# Patient Record
Sex: FEMALE | Race: WHITE | NOT HISPANIC OR LATINO | Employment: OTHER | URBAN - METROPOLITAN AREA
[De-identification: names, ages, dates, MRNs, and addresses within clinical notes are randomized per-mention and may not be internally consistent; named-entity substitution may affect disease eponyms.]

---

## 2017-05-04 ENCOUNTER — CONVERSION ENCOUNTER (OUTPATIENT)
Dept: MAMMOGRAPHY | Facility: HOSPITAL | Age: 77
End: 2017-05-04

## 2017-07-03 ENCOUNTER — CONVERSION ENCOUNTER (OUTPATIENT)
Dept: MAMMOGRAPHY | Facility: HOSPITAL | Age: 77
End: 2017-07-03

## 2017-10-11 ENCOUNTER — TELEPHONE (OUTPATIENT)
Dept: SURGERY | Facility: CLINIC | Age: 77
End: 2017-10-11

## 2017-10-11 NOTE — TELEPHONE ENCOUNTER
Note from Jordon Garland dated October 10, 2017.  Patient continues on Arimidex and plan for 5 years.  He reviewed her bone density result.  No evidence of recurrence.  See her in one year.

## 2018-05-08 ENCOUNTER — TELEPHONE CONVERTED (OUTPATIENT)
Dept: ONCOLOGY | Facility: HOSPITAL | Age: 78
End: 2018-05-08

## 2018-08-21 ENCOUNTER — TELEPHONE CONVERTED (OUTPATIENT)
Dept: ONCOLOGY | Facility: HOSPITAL | Age: 78
End: 2018-08-21
Attending: NURSE PRACTITIONER

## 2018-10-10 ENCOUNTER — OFFICE VISIT CONVERTED (OUTPATIENT)
Dept: ONCOLOGY | Facility: HOSPITAL | Age: 78
End: 2018-10-10
Attending: INTERNAL MEDICINE

## 2019-03-05 ENCOUNTER — HOSPITAL ENCOUNTER (OUTPATIENT)
Dept: MAMMOGRAPHY | Facility: HOSPITAL | Age: 79
Discharge: HOME | End: 2019-03-05
Attending: NURSE PRACTITIONER

## 2019-05-17 ENCOUNTER — HOSPITAL ENCOUNTER (OUTPATIENT)
Dept: LAB | Facility: HOSPITAL | Age: 79
Discharge: HOME | End: 2019-05-17
Attending: INTERNAL MEDICINE

## 2019-05-17 LAB
ALBUMIN SERPL-MCNC: 3.8 G/DL (ref 3.5–5)
ALBUMIN/GLOB SERPL: 1.2 {RATIO} (ref 1.4–2.6)
ALP SERPL-CCNC: 125 U/L (ref 43–160)
ALT SERPL-CCNC: 12 U/L (ref 10–40)
ANION GAP SERPL CALC-SCNC: 17 MMOL/L (ref 8–19)
AST SERPL-CCNC: 14 U/L (ref 15–50)
BASOPHILS # BLD AUTO: 0.02 10*3/UL (ref 0–0.2)
BASOPHILS NFR BLD AUTO: 0.2 % (ref 0–3)
BILIRUB SERPL-MCNC: 0.24 MG/DL (ref 0.2–1.3)
BUN SERPL-MCNC: 26 MG/DL (ref 5–25)
BUN/CREAT SERPL: 18 {RATIO} (ref 6–20)
CALCIUM SERPL-MCNC: 9.6 MG/DL (ref 8.7–10.4)
CHLORIDE SERPL-SCNC: 108 MMOL/L (ref 99–111)
CONV ABS IMM GRAN: 0.05 10*3/UL (ref 0–0.2)
CONV CO2: 22 MMOL/L (ref 22–32)
CONV CREATININE URINE, RANDOM: 111.4 MG/DL (ref 10–300)
CONV IMMATURE GRAN: 0.6 % (ref 0–1.8)
CONV MICROALBUM.,U,RANDOM: <12 MG/L (ref 0–20)
CONV TOTAL PROTEIN: 6.9 G/DL (ref 6.3–8.2)
CREAT UR-MCNC: 1.48 MG/DL (ref 0.5–0.9)
DEPRECATED RDW RBC AUTO: 46.3 FL (ref 36.4–46.3)
EOSINOPHIL # BLD AUTO: 0.37 10*3/UL (ref 0–0.7)
EOSINOPHIL # BLD AUTO: 4.5 % (ref 0–7)
ERYTHROCYTE [DISTWIDTH] IN BLOOD BY AUTOMATED COUNT: 14.9 % (ref 11.7–14.4)
EST. AVERAGE GLUCOSE BLD GHB EST-MCNC: 120 MG/DL
GFR SERPLBLD BASED ON 1.73 SQ M-ARVRAT: 33 ML/MIN/{1.73_M2}
GLOBULIN UR ELPH-MCNC: 3.1 G/DL (ref 2–3.5)
GLUCOSE SERPL-MCNC: 109 MG/DL (ref 65–99)
HBA1C MFR BLD: 5.8 % (ref 3.5–5.7)
HBA1C MFR BLD: 9.3 G/DL (ref 12–16)
HCT VFR BLD AUTO: 29.2 % (ref 37–47)
IRON SATN MFR SERPL: 7 % (ref 20–55)
IRON SERPL-MCNC: 26 UG/DL (ref 60–170)
LYMPHOCYTES # BLD AUTO: 2.76 10*3/UL (ref 1–5)
MCH RBC QN AUTO: 27.4 PG (ref 27–31)
MCHC RBC AUTO-ENTMCNC: 31.8 G/DL (ref 33–37)
MCV RBC AUTO: 86.1 FL (ref 81–99)
MICROALBUMIN/CREAT UR: 10.8 MG/G{CRE} (ref 0–35)
MONOCYTES # BLD AUTO: 0.61 10*3/UL (ref 0.2–1.2)
MONOCYTES NFR BLD AUTO: 7.4 % (ref 3–10)
NEUTROPHILS # BLD AUTO: 4.45 10*3/UL (ref 2–8)
NEUTROPHILS NFR BLD AUTO: 53.9 % (ref 30–85)
NRBC CBCN: 0 % (ref 0–0.7)
OSMOLALITY SERPL CALC.SUM OF ELEC: 301 MOSM/KG (ref 273–304)
PLATELET # BLD AUTO: 217 10*3/UL (ref 130–400)
PMV BLD AUTO: 10.6 FL (ref 9.4–12.3)
POTASSIUM SERPL-SCNC: 4 MMOL/L (ref 3.5–5.3)
RBC # BLD AUTO: 3.39 10*6/UL (ref 4.2–5.4)
SODIUM SERPL-SCNC: 143 MMOL/L (ref 135–147)
TIBC SERPL-MCNC: 395 UG/DL (ref 245–450)
TRANSFERRIN SERPL-MCNC: 276 MG/DL (ref 250–380)
VARIANT LYMPHS NFR BLD MANUAL: 33.4 % (ref 20–45)
WBC # BLD AUTO: 8.26 10*3/UL (ref 4.8–10.8)

## 2019-07-18 ENCOUNTER — OFFICE VISIT CONVERTED (OUTPATIENT)
Dept: GASTROENTEROLOGY | Facility: CLINIC | Age: 79
End: 2019-07-18
Attending: NURSE PRACTITIONER

## 2019-07-18 ENCOUNTER — CONVERSION ENCOUNTER (OUTPATIENT)
Dept: GASTROENTEROLOGY | Facility: CLINIC | Age: 79
End: 2019-07-18

## 2019-08-21 ENCOUNTER — HOSPITAL ENCOUNTER (OUTPATIENT)
Dept: GASTROENTEROLOGY | Facility: HOSPITAL | Age: 79
Setting detail: HOSPITAL OUTPATIENT SURGERY
Discharge: HOME | End: 2019-08-21
Attending: INTERNAL MEDICINE

## 2019-08-21 LAB — GLUCOSE BLD-MCNC: 183 MG/DL (ref 65–99)

## 2019-09-23 ENCOUNTER — HOSPITAL ENCOUNTER (OUTPATIENT)
Dept: LAB | Facility: HOSPITAL | Age: 79
Discharge: HOME | End: 2019-09-23
Attending: INTERNAL MEDICINE

## 2019-09-23 LAB
ALBUMIN SERPL-MCNC: 3.9 G/DL (ref 3.5–5)
ALBUMIN/GLOB SERPL: 1.4 {RATIO} (ref 1.4–2.6)
ALP SERPL-CCNC: 130 U/L (ref 43–160)
ALT SERPL-CCNC: 13 U/L (ref 10–40)
ANION GAP SERPL CALC-SCNC: 13 MMOL/L (ref 8–19)
AST SERPL-CCNC: 14 U/L (ref 15–50)
BASOPHILS # BLD AUTO: 0.03 10*3/UL (ref 0–0.2)
BASOPHILS NFR BLD AUTO: 0.4 % (ref 0–3)
BILIRUB SERPL-MCNC: 0.24 MG/DL (ref 0.2–1.3)
BUN SERPL-MCNC: 32 MG/DL (ref 5–25)
BUN/CREAT SERPL: 22 {RATIO} (ref 6–20)
CALCIUM SERPL-MCNC: 9.9 MG/DL (ref 8.7–10.4)
CHLORIDE SERPL-SCNC: 107 MMOL/L (ref 99–111)
CHOLEST SERPL-MCNC: 168 MG/DL (ref 107–200)
CHOLEST/HDLC SERPL: 4.1 {RATIO} (ref 3–6)
CONV ABS IMM GRAN: 0.04 10*3/UL (ref 0–0.2)
CONV CO2: 24 MMOL/L (ref 22–32)
CONV IMMATURE GRAN: 0.5 % (ref 0–1.8)
CONV TOTAL PROTEIN: 6.7 G/DL (ref 6.3–8.2)
CREAT UR-MCNC: 1.48 MG/DL (ref 0.5–0.9)
DEPRECATED RDW RBC AUTO: 47.4 FL (ref 36.4–46.3)
EOSINOPHIL # BLD AUTO: 0.32 10*3/UL (ref 0–0.7)
EOSINOPHIL # BLD AUTO: 4 % (ref 0–7)
ERYTHROCYTE [DISTWIDTH] IN BLOOD BY AUTOMATED COUNT: 15.1 % (ref 11.7–14.4)
EST. AVERAGE GLUCOSE BLD GHB EST-MCNC: 151 MG/DL
GFR SERPLBLD BASED ON 1.73 SQ M-ARVRAT: 33 ML/MIN/{1.73_M2}
GLOBULIN UR ELPH-MCNC: 2.8 G/DL (ref 2–3.5)
GLUCOSE SERPL-MCNC: 115 MG/DL (ref 65–99)
HBA1C MFR BLD: 6.9 % (ref 3.5–5.7)
HCT VFR BLD AUTO: 30.6 % (ref 37–47)
HDLC SERPL-MCNC: 41 MG/DL (ref 40–60)
HGB BLD-MCNC: 9.8 G/DL (ref 12–16)
LDLC SERPL CALC-MCNC: 88 MG/DL (ref 70–100)
LYMPHOCYTES # BLD AUTO: 2.94 10*3/UL (ref 1–5)
LYMPHOCYTES NFR BLD AUTO: 36.8 % (ref 20–45)
MCH RBC QN AUTO: 27.4 PG (ref 27–31)
MCHC RBC AUTO-ENTMCNC: 32 G/DL (ref 33–37)
MCV RBC AUTO: 85.5 FL (ref 81–99)
MONOCYTES # BLD AUTO: 0.61 10*3/UL (ref 0.2–1.2)
MONOCYTES NFR BLD AUTO: 7.6 % (ref 3–10)
NEUTROPHILS # BLD AUTO: 4.04 10*3/UL (ref 2–8)
NEUTROPHILS NFR BLD AUTO: 50.7 % (ref 30–85)
NRBC CBCN: 0 % (ref 0–0.7)
OSMOLALITY SERPL CALC.SUM OF ELEC: 298 MOSM/KG (ref 273–304)
PLATELET # BLD AUTO: 191 10*3/UL (ref 130–400)
PMV BLD AUTO: 10.7 FL (ref 9.4–12.3)
POTASSIUM SERPL-SCNC: 4.1 MMOL/L (ref 3.5–5.3)
RBC # BLD AUTO: 3.58 10*6/UL (ref 4.2–5.4)
SODIUM SERPL-SCNC: 140 MMOL/L (ref 135–147)
TRIGL SERPL-MCNC: 197 MG/DL (ref 40–150)
VLDLC SERPL-MCNC: 39 MG/DL (ref 5–37)
WBC # BLD AUTO: 7.98 10*3/UL (ref 4.8–10.8)

## 2019-09-26 ENCOUNTER — HOSPITAL ENCOUNTER (OUTPATIENT)
Dept: INFUSION THERAPY | Facility: HOSPITAL | Age: 79
Setting detail: RECURRING SERIES
Discharge: HOME | End: 2019-09-30
Attending: INTERNAL MEDICINE

## 2019-10-24 ENCOUNTER — HOSPITAL ENCOUNTER (OUTPATIENT)
Dept: ONCOLOGY | Facility: HOSPITAL | Age: 79
Discharge: HOME | End: 2019-10-24
Attending: NURSE PRACTITIONER

## 2019-10-24 ENCOUNTER — OFFICE VISIT CONVERTED (OUTPATIENT)
Dept: ONCOLOGY | Facility: HOSPITAL | Age: 79
End: 2019-10-24
Attending: NURSE PRACTITIONER

## 2020-01-09 ENCOUNTER — HOSPITAL ENCOUNTER (OUTPATIENT)
Dept: LAB | Facility: HOSPITAL | Age: 80
Discharge: HOME | End: 2020-01-09
Attending: INTERNAL MEDICINE

## 2020-01-09 LAB
ALBUMIN SERPL-MCNC: 3.8 G/DL (ref 3.5–5)
ALBUMIN/GLOB SERPL: 1.3 {RATIO} (ref 1.4–2.6)
ALP SERPL-CCNC: 124 U/L (ref 43–160)
ALT SERPL-CCNC: 16 U/L (ref 10–40)
ANION GAP SERPL CALC-SCNC: 15 MMOL/L (ref 8–19)
AST SERPL-CCNC: 15 U/L (ref 15–50)
BASOPHILS # BLD AUTO: 0.02 10*3/UL (ref 0–0.2)
BASOPHILS NFR BLD AUTO: 0.3 % (ref 0–3)
BILIRUB SERPL-MCNC: 0.25 MG/DL (ref 0.2–1.3)
BNP SERPL-MCNC: 275 PG/ML (ref 0–1800)
BUN SERPL-MCNC: 26 MG/DL (ref 5–25)
BUN/CREAT SERPL: 17 {RATIO} (ref 6–20)
CALCIUM SERPL-MCNC: 10.1 MG/DL (ref 8.7–10.4)
CHLORIDE SERPL-SCNC: 105 MMOL/L (ref 99–111)
CONV ABS IMM GRAN: 0.02 10*3/UL (ref 0–0.2)
CONV CO2: 21 MMOL/L (ref 22–32)
CONV IMMATURE GRAN: 0.3 % (ref 0–1.8)
CONV TOTAL PROTEIN: 6.7 G/DL (ref 6.3–8.2)
CREAT UR-MCNC: 1.51 MG/DL (ref 0.5–0.9)
DEPRECATED RDW RBC AUTO: 48.2 FL (ref 36.4–46.3)
EOSINOPHIL # BLD AUTO: 0.24 10*3/UL (ref 0–0.7)
EOSINOPHIL # BLD AUTO: 3.5 % (ref 0–7)
ERYTHROCYTE [DISTWIDTH] IN BLOOD BY AUTOMATED COUNT: 15.2 % (ref 11.7–14.4)
EST. AVERAGE GLUCOSE BLD GHB EST-MCNC: 154 MG/DL
FERRITIN SERPL-MCNC: 55 NG/ML (ref 10–200)
GFR SERPLBLD BASED ON 1.73 SQ M-ARVRAT: 32 ML/MIN/{1.73_M2}
GLOBULIN UR ELPH-MCNC: 2.9 G/DL (ref 2–3.5)
GLUCOSE SERPL-MCNC: 171 MG/DL (ref 65–99)
HBA1C MFR BLD: 7 % (ref 3.5–5.7)
HCT VFR BLD AUTO: 33.6 % (ref 37–47)
HGB BLD-MCNC: 10.9 G/DL (ref 12–16)
IRON SATN MFR SERPL: 16 % (ref 20–55)
IRON SERPL-MCNC: 58 UG/DL (ref 60–170)
LYMPHOCYTES # BLD AUTO: 3.07 10*3/UL (ref 1–5)
LYMPHOCYTES NFR BLD AUTO: 44.3 % (ref 20–45)
MCH RBC QN AUTO: 28.2 PG (ref 27–31)
MCHC RBC AUTO-ENTMCNC: 32.4 G/DL (ref 33–37)
MCV RBC AUTO: 86.8 FL (ref 81–99)
MONOCYTES # BLD AUTO: 0.46 10*3/UL (ref 0.2–1.2)
MONOCYTES NFR BLD AUTO: 6.6 % (ref 3–10)
NEUTROPHILS # BLD AUTO: 3.12 10*3/UL (ref 2–8)
NEUTROPHILS NFR BLD AUTO: 45 % (ref 30–85)
NRBC CBCN: 0 % (ref 0–0.7)
OSMOLALITY SERPL CALC.SUM OF ELEC: 293 MOSM/KG (ref 273–304)
PLATELET # BLD AUTO: 175 10*3/UL (ref 130–400)
PMV BLD AUTO: 10.5 FL (ref 9.4–12.3)
POTASSIUM SERPL-SCNC: 4 MMOL/L (ref 3.5–5.3)
RBC # BLD AUTO: 3.87 10*6/UL (ref 4.2–5.4)
SODIUM SERPL-SCNC: 137 MMOL/L (ref 135–147)
TIBC SERPL-MCNC: 353 UG/DL (ref 245–450)
TRANSFERRIN SERPL-MCNC: 247 MG/DL (ref 250–380)
WBC # BLD AUTO: 6.93 10*3/UL (ref 4.8–10.8)

## 2020-03-09 ENCOUNTER — HOSPITAL ENCOUNTER (OUTPATIENT)
Dept: GENERAL RADIOLOGY | Facility: HOSPITAL | Age: 80
Discharge: HOME | End: 2020-03-09
Attending: NURSE PRACTITIONER

## 2020-07-13 ENCOUNTER — HOSPITAL ENCOUNTER (OUTPATIENT)
Dept: LAB | Facility: HOSPITAL | Age: 80
Discharge: HOME | End: 2020-07-13
Attending: INTERNAL MEDICINE

## 2020-07-13 LAB
25(OH)D3 SERPL-MCNC: 51.7 NG/ML (ref 30–100)
ALBUMIN SERPL-MCNC: 4 G/DL (ref 3.5–5)
ALBUMIN/GLOB SERPL: 1.4 {RATIO} (ref 1.4–2.6)
ALP SERPL-CCNC: 106 U/L (ref 43–160)
ALT SERPL-CCNC: 16 U/L (ref 10–40)
ANION GAP SERPL CALC-SCNC: 16 MMOL/L (ref 8–19)
AST SERPL-CCNC: 17 U/L (ref 15–50)
BASOPHILS # BLD AUTO: 0.04 10*3/UL (ref 0–0.2)
BASOPHILS NFR BLD AUTO: 0.5 % (ref 0–3)
BILIRUB SERPL-MCNC: 0.29 MG/DL (ref 0.2–1.3)
BUN SERPL-MCNC: 38 MG/DL (ref 5–25)
BUN/CREAT SERPL: 21 {RATIO} (ref 6–20)
CALCIUM SERPL-MCNC: 9.5 MG/DL (ref 8.7–10.4)
CHLORIDE SERPL-SCNC: 106 MMOL/L (ref 99–111)
CHOLEST SERPL-MCNC: 192 MG/DL (ref 107–200)
CHOLEST/HDLC SERPL: 4.5 {RATIO} (ref 3–6)
CONV ABS IMM GRAN: 0.04 10*3/UL (ref 0–0.2)
CONV CO2: 22 MMOL/L (ref 22–32)
CONV IMMATURE GRAN: 0.5 % (ref 0–1.8)
CONV TOTAL PROTEIN: 6.8 G/DL (ref 6.3–8.2)
CREAT UR-MCNC: 1.85 MG/DL (ref 0.5–0.9)
DEPRECATED RDW RBC AUTO: 51.7 FL (ref 36.4–46.3)
EOSINOPHIL # BLD AUTO: 0.38 10*3/UL (ref 0–0.7)
EOSINOPHIL # BLD AUTO: 4.6 % (ref 0–7)
ERYTHROCYTE [DISTWIDTH] IN BLOOD BY AUTOMATED COUNT: 15.9 % (ref 11.7–14.4)
EST. AVERAGE GLUCOSE BLD GHB EST-MCNC: 171 MG/DL
GFR SERPLBLD BASED ON 1.73 SQ M-ARVRAT: 25 ML/MIN/{1.73_M2}
GLOBULIN UR ELPH-MCNC: 2.8 G/DL (ref 2–3.5)
GLUCOSE SERPL-MCNC: 146 MG/DL (ref 65–99)
HBA1C MFR BLD: 7.6 % (ref 3.5–5.7)
HCT VFR BLD AUTO: 32.7 % (ref 37–47)
HDLC SERPL-MCNC: 43 MG/DL (ref 40–60)
HGB BLD-MCNC: 10.4 G/DL (ref 12–16)
LDLC SERPL CALC-MCNC: 109 MG/DL (ref 70–100)
LYMPHOCYTES # BLD AUTO: 3.12 10*3/UL (ref 1–5)
LYMPHOCYTES NFR BLD AUTO: 37.7 % (ref 20–45)
MCH RBC QN AUTO: 28.1 PG (ref 27–31)
MCHC RBC AUTO-ENTMCNC: 31.8 G/DL (ref 33–37)
MCV RBC AUTO: 88.4 FL (ref 81–99)
MONOCYTES # BLD AUTO: 0.56 10*3/UL (ref 0.2–1.2)
MONOCYTES NFR BLD AUTO: 6.8 % (ref 3–10)
NEUTROPHILS # BLD AUTO: 4.14 10*3/UL (ref 2–8)
NEUTROPHILS NFR BLD AUTO: 49.9 % (ref 30–85)
NRBC CBCN: 0 % (ref 0–0.7)
OSMOLALITY SERPL CALC.SUM OF ELEC: 302 MOSM/KG (ref 273–304)
PLATELET # BLD AUTO: 201 10*3/UL (ref 130–400)
PMV BLD AUTO: 10.8 FL (ref 9.4–12.3)
POTASSIUM SERPL-SCNC: 4.1 MMOL/L (ref 3.5–5.3)
RBC # BLD AUTO: 3.7 10*6/UL (ref 4.2–5.4)
SODIUM SERPL-SCNC: 140 MMOL/L (ref 135–147)
TRIGL SERPL-MCNC: 202 MG/DL (ref 40–150)
TSH SERPL-ACNC: 3.52 M[IU]/L (ref 0.27–4.2)
VLDLC SERPL-MCNC: 40 MG/DL (ref 5–37)
WBC # BLD AUTO: 8.28 10*3/UL (ref 4.8–10.8)

## 2020-10-27 ENCOUNTER — HOSPITAL ENCOUNTER (OUTPATIENT)
Dept: CARDIOLOGY | Facility: HOSPITAL | Age: 80
Discharge: HOME | End: 2020-10-27
Attending: INTERNAL MEDICINE

## 2020-11-02 ENCOUNTER — HOSPITAL ENCOUNTER (OUTPATIENT)
Dept: LAB | Facility: HOSPITAL | Age: 80
Discharge: HOME | End: 2020-11-02
Attending: INTERNAL MEDICINE

## 2020-11-02 LAB
ALBUMIN SERPL-MCNC: 3.7 G/DL (ref 3.5–5)
ALBUMIN/GLOB SERPL: 1.2 {RATIO} (ref 1.4–2.6)
ALP SERPL-CCNC: 134 U/L (ref 43–160)
ALT SERPL-CCNC: 18 U/L (ref 10–40)
AMPHETAMINES UR QL SCN: NEGATIVE
ANION GAP SERPL CALC-SCNC: 18 MMOL/L (ref 8–19)
AST SERPL-CCNC: 18 U/L (ref 15–50)
BARBITURATES UR QL SCN: NEGATIVE
BASOPHILS # BLD AUTO: 0.04 10*3/UL (ref 0–0.2)
BASOPHILS NFR BLD AUTO: 0.5 % (ref 0–3)
BENZODIAZ UR QL SCN: NEGATIVE
BILIRUB SERPL-MCNC: 0.23 MG/DL (ref 0.2–1.3)
BUN SERPL-MCNC: 32 MG/DL (ref 5–25)
BUN/CREAT SERPL: 19 {RATIO} (ref 6–20)
CALCIUM SERPL-MCNC: 9.8 MG/DL (ref 8.7–10.4)
CHLORIDE SERPL-SCNC: 110 MMOL/L (ref 99–111)
CONV ABS IMM GRAN: 0.03 10*3/UL (ref 0–0.2)
CONV CO2: 18 MMOL/L (ref 22–32)
CONV COCAINE, UR: NEGATIVE
CONV IMMATURE GRAN: 0.4 % (ref 0–1.8)
CONV TOTAL PROTEIN: 6.7 G/DL (ref 6.3–8.2)
CREAT UR-MCNC: 1.66 MG/DL (ref 0.5–0.9)
DEPRECATED RDW RBC AUTO: 49.2 FL (ref 36.4–46.3)
EOSINOPHIL # BLD AUTO: 0.23 10*3/UL (ref 0–0.7)
EOSINOPHIL # BLD AUTO: 2.7 % (ref 0–7)
ERYTHROCYTE [DISTWIDTH] IN BLOOD BY AUTOMATED COUNT: 15.9 % (ref 11.7–14.4)
EST. AVERAGE GLUCOSE BLD GHB EST-MCNC: 183 MG/DL
GFR SERPLBLD BASED ON 1.73 SQ M-ARVRAT: 29 ML/MIN/{1.73_M2}
GLOBULIN UR ELPH-MCNC: 3 G/DL (ref 2–3.5)
GLUCOSE SERPL-MCNC: 78 MG/DL (ref 65–99)
HBA1C MFR BLD: 8 % (ref 3.5–5.7)
HCT VFR BLD AUTO: 34.4 % (ref 37–47)
HGB BLD-MCNC: 11.1 G/DL (ref 12–16)
LYMPHOCYTES # BLD AUTO: 2.65 10*3/UL (ref 1–5)
LYMPHOCYTES NFR BLD AUTO: 31.1 % (ref 20–45)
MCH RBC QN AUTO: 27.7 PG (ref 27–31)
MCHC RBC AUTO-ENTMCNC: 32.3 G/DL (ref 33–37)
MCV RBC AUTO: 85.8 FL (ref 81–99)
METHADONE UR QL SCN: NEGATIVE
MONOCYTES # BLD AUTO: 0.57 10*3/UL (ref 0.2–1.2)
MONOCYTES NFR BLD AUTO: 6.7 % (ref 3–10)
NEUTROPHILS # BLD AUTO: 4.99 10*3/UL (ref 2–8)
NEUTROPHILS NFR BLD AUTO: 58.6 % (ref 30–85)
NRBC CBCN: 0 % (ref 0–0.7)
OPIATES TESTED UR SCN: NEGATIVE
OSMOLALITY SERPL CALC.SUM OF ELEC: 300 MOSM/KG (ref 273–304)
OXYCODONE UR QL SCN: NEGATIVE
PCP UR QL: NEGATIVE
PLATELET # BLD AUTO: 216 10*3/UL (ref 130–400)
PMV BLD AUTO: 10.3 FL (ref 9.4–12.3)
POTASSIUM SERPL-SCNC: 3.8 MMOL/L (ref 3.5–5.3)
RBC # BLD AUTO: 4.01 10*6/UL (ref 4.2–5.4)
SODIUM SERPL-SCNC: 142 MMOL/L (ref 135–147)
THC SERPLBLD CFM-MCNC: NEGATIVE NG/ML
WBC # BLD AUTO: 8.51 10*3/UL (ref 4.8–10.8)

## 2020-11-10 ENCOUNTER — HOSPITAL ENCOUNTER (OUTPATIENT)
Dept: GENERAL RADIOLOGY | Facility: HOSPITAL | Age: 80
Discharge: HOME | End: 2020-11-10
Attending: INTERNAL MEDICINE

## 2020-11-18 ENCOUNTER — HOSPITAL ENCOUNTER (OUTPATIENT)
Dept: GENERAL RADIOLOGY | Facility: HOSPITAL | Age: 80
Discharge: HOME | End: 2020-11-18
Attending: INTERNAL MEDICINE

## 2021-04-12 ENCOUNTER — HOSPITAL ENCOUNTER (OUTPATIENT)
Dept: LAB | Facility: HOSPITAL | Age: 81
Discharge: HOME | End: 2021-04-12
Attending: INTERNAL MEDICINE

## 2021-04-12 LAB
ALBUMIN SERPL-MCNC: 3.6 G/DL (ref 3.5–5)
ALBUMIN/GLOB SERPL: 1.2 {RATIO} (ref 1.4–2.6)
ALP SERPL-CCNC: 122 U/L (ref 43–160)
ALT SERPL-CCNC: 16 U/L (ref 10–40)
AMPHETAMINES UR QL SCN: NEGATIVE
ANION GAP SERPL CALC-SCNC: 14 MMOL/L (ref 8–19)
AST SERPL-CCNC: 19 U/L (ref 15–50)
BARBITURATES UR QL SCN: NEGATIVE
BASOPHILS # BLD AUTO: 0.02 10*3/UL (ref 0–0.2)
BASOPHILS NFR BLD AUTO: 0.2 % (ref 0–3)
BENZODIAZ UR QL SCN: NEGATIVE
BILIRUB SERPL-MCNC: 0.21 MG/DL (ref 0.2–1.3)
BUN SERPL-MCNC: 38 MG/DL (ref 5–25)
BUN/CREAT SERPL: 20 {RATIO} (ref 6–20)
CALCIUM SERPL-MCNC: 9.9 MG/DL (ref 8.7–10.4)
CHLORIDE SERPL-SCNC: 109 MMOL/L (ref 99–111)
CHOLEST SERPL-MCNC: 217 MG/DL (ref 107–200)
CHOLEST/HDLC SERPL: 5.3 {RATIO} (ref 3–6)
CONV ABS IMM GRAN: 0.03 10*3/UL (ref 0–0.2)
CONV CO2: 20 MMOL/L (ref 22–32)
CONV COCAINE, UR: NEGATIVE
CONV CREATININE URINE, RANDOM: 195.6 MG/DL (ref 10–300)
CONV IMMATURE GRAN: 0.4 % (ref 0–1.8)
CONV MICROALBUM.,U,RANDOM: <12 MG/L (ref 0–20)
CONV TOTAL PROTEIN: 6.7 G/DL (ref 6.3–8.2)
CREAT UR-MCNC: 1.91 MG/DL (ref 0.5–0.9)
DEPRECATED RDW RBC AUTO: 51 FL (ref 36.4–46.3)
EOSINOPHIL # BLD AUTO: 0.21 10*3/UL (ref 0–0.7)
EOSINOPHIL # BLD AUTO: 2.6 % (ref 0–7)
ERYTHROCYTE [DISTWIDTH] IN BLOOD BY AUTOMATED COUNT: 16.3 % (ref 11.7–14.4)
EST. AVERAGE GLUCOSE BLD GHB EST-MCNC: 148 MG/DL
GFR SERPLBLD BASED ON 1.73 SQ M-ARVRAT: 24 ML/MIN/{1.73_M2}
GLOBULIN UR ELPH-MCNC: 3.1 G/DL (ref 2–3.5)
GLUCOSE SERPL-MCNC: 126 MG/DL (ref 65–99)
HBA1C MFR BLD: 6.8 % (ref 3.5–5.7)
HCT VFR BLD AUTO: 33.4 % (ref 37–47)
HDLC SERPL-MCNC: 41 MG/DL (ref 40–60)
HGB BLD-MCNC: 10.5 G/DL (ref 12–16)
LDLC SERPL CALC-MCNC: 108 MG/DL (ref 70–100)
LYMPHOCYTES # BLD AUTO: 2.99 10*3/UL (ref 1–5)
LYMPHOCYTES NFR BLD AUTO: 36.6 % (ref 20–45)
MCH RBC QN AUTO: 27.2 PG (ref 27–31)
MCHC RBC AUTO-ENTMCNC: 31.4 G/DL (ref 33–37)
MCV RBC AUTO: 86.5 FL (ref 81–99)
METHADONE UR QL SCN: NEGATIVE
MICROALBUMIN/CREAT UR: 6.1 MG/G{CRE} (ref 0–35)
MONOCYTES # BLD AUTO: 0.58 10*3/UL (ref 0.2–1.2)
MONOCYTES NFR BLD AUTO: 7.1 % (ref 3–10)
NEUTROPHILS # BLD AUTO: 4.33 10*3/UL (ref 2–8)
NEUTROPHILS NFR BLD AUTO: 53.1 % (ref 30–85)
NRBC CBCN: 0 % (ref 0–0.7)
OPIATES TESTED UR SCN: NEGATIVE
OSMOLALITY SERPL CALC.SUM OF ELEC: 299 MOSM/KG (ref 273–304)
OXYCODONE UR QL SCN: NEGATIVE
PCP UR QL: NEGATIVE
PLATELET # BLD AUTO: 209 10*3/UL (ref 130–400)
PMV BLD AUTO: 10.7 FL (ref 9.4–12.3)
POTASSIUM SERPL-SCNC: 4.3 MMOL/L (ref 3.5–5.3)
RBC # BLD AUTO: 3.86 10*6/UL (ref 4.2–5.4)
SODIUM SERPL-SCNC: 139 MMOL/L (ref 135–147)
THC SERPLBLD CFM-MCNC: NEGATIVE NG/ML
TRIGL SERPL-MCNC: 341 MG/DL (ref 40–150)
TSH SERPL-ACNC: 2.96 M[IU]/L (ref 0.27–4.2)
VLDLC SERPL-MCNC: 68 MG/DL (ref 5–37)
WBC # BLD AUTO: 8.16 10*3/UL (ref 4.8–10.8)

## 2021-04-20 ENCOUNTER — HOSPITAL ENCOUNTER (OUTPATIENT)
Dept: GENERAL RADIOLOGY | Facility: HOSPITAL | Age: 81
Discharge: HOME | End: 2021-04-20
Attending: INTERNAL MEDICINE

## 2021-04-29 ENCOUNTER — HOSPITAL ENCOUNTER (OUTPATIENT)
Dept: GENERAL RADIOLOGY | Facility: HOSPITAL | Age: 81
Discharge: HOME | End: 2021-04-29
Attending: INTERNAL MEDICINE

## 2021-05-15 VITALS
BODY MASS INDEX: 36.02 KG/M2 | SYSTOLIC BLOOD PRESSURE: 115 MMHG | HEIGHT: 66 IN | DIASTOLIC BLOOD PRESSURE: 56 MMHG | WEIGHT: 224.12 LBS

## 2021-05-28 VITALS
TEMPERATURE: 97.8 F | DIASTOLIC BLOOD PRESSURE: 41 MMHG | SYSTOLIC BLOOD PRESSURE: 109 MMHG | OXYGEN SATURATION: 97 % | HEIGHT: 64 IN | RESPIRATION RATE: 16 BRPM | WEIGHT: 229.94 LBS | HEART RATE: 45 BPM | BODY MASS INDEX: 39.26 KG/M2

## 2021-05-28 VITALS
SYSTOLIC BLOOD PRESSURE: 126 MMHG | WEIGHT: 232.59 LBS | DIASTOLIC BLOOD PRESSURE: 60 MMHG | HEIGHT: 64 IN | TEMPERATURE: 97 F | HEART RATE: 86 BPM | BODY MASS INDEX: 39.71 KG/M2 | OXYGEN SATURATION: 98 %

## 2021-05-28 NOTE — TELEPHONE ENCOUNTER
Patient: MISAEL THORNE     Acct: [id number]     Report: #[id number]  UNIT #: [id number]     : 1940    Encounter Date:2018  PRIMARY CARE: Willie Ly  ***Signed***  --------------------------------------------------------------------------------------------------------------------  Allergies      Coded Allergies:             STATINS-HMG-COA REDUCTASE INHIBITOR (Verified  Allergy, Unknown, MUSCLE     CRAMPS, 17)      Uncoded Allergies:             HORMONES AND SYNTHETIC SUBSTITUTES (Allergy, Mild, 10/5/07)            Medications      Last Reconciled on 10/10/17 16:43 by JORDON GARLAND MD      Anastrozole (Arimidex*) 1 Mg Tablet      1 MG PO QDAY, #90 TAB         Prov: SIL SMITH onc         18       Anastrozole (Arimidex*) 1 Mg Tablet      1 MG PO QDAY, #30 TAB 5 Refills         Prov: Yonathan Villarreal         18       (Lantus) Unknown Strength  No Conflict Check               Reported         10/10/17       DULoxetine (Cymbalta) 60 Mg Capsule.dr      60 MG PO QDAY, #30 CAP 0 Refills         Reported         17       Hydrocodone/Acetaminophen 7.5/325 MG (Norco 7.5/325 Mg) 1 Tab Tab      1 TAB PO Q4H PRN for BREAKTHROUGH PAIN, TAB 0 Refills         Reported         17       Trimethoprim/Sulfamethoxazole DS (Bactrim /160 MG*) 1 Each Tablet      1 TAB PO BID, TAB 0 Refills         Reported         17       Carvedilol (Carvedilol) 12.5 Mg Tablet      12.5 MG PO BID, #60 TAB 0 Refills         Reported         17       Anastrozole (Arimidex*) 1 Mg Tablet      1 MG PO QDAY, #90 TAB 3 Refills         Prov: Jordon Garland         17       Cyanocobalamin (Vitamin B-12*) 1,000 Mcg Tablet      1000 MCG PO QDAY, #30 TAB 0 Refills         Reported         10/25/16       Cholecalciferol (Vitamin D3*) 2,000 U Tablet      2000 UNITS PO QDAY, #30 TAB 0 Refills         Reported         10/25/16       Apixaban (Eliquis) 5 Mg Tab      5 MG PO BID, #60 TAB         Reported          6/21/16       glyBURIDE/metFORMIN 2.5/500 Mg* (Glucovance*) 1 Tab Tablet      1 TAB PO BID MEALS, #60 TAB 0 Refills         Reported         6/21/16       Losartan/Hydrochlorothiazide (Losartan/Hctz 100/25 Mg) 1 Tab Tablet      1 TAB PO QDAY, #30 TAB 0 Refills         Reported         6/21/16       Anastrozole (Anastrozole) 1 Mg Tab      1 MG PO QDAY, #30 TAB         Reported         6/21/16       Doxazosin Mesylate (Cardura) 4 Mg Tablet      4 MG PO QDAY, #30 TAB         Reported         6/21/16       Diltiazem CD (Cardizem CD) 180 Mg Capcr      180 MG PO QDAY, #30 CAP.ER         Reported         6/21/16       hydrALAZINE HCl (Apresoline) 50 Mg Tab      100 MG PO TID, #30 TAB 0 Refills         Reported         3/8/16       sitaGLIPtin (Januvia*) 50 Mg Tablet      50 MG PO QDAY, TAB         Reported         11/19/15       Zolpidem Tartrate (Ambien) 5 Mg Tablet      5 MG PO HS PRN for SLEEP, TAB 0 Refills         Reported         12/31/14       Allopurinol (Allopurinol) 300 Mg Tablet      300 MG PO QDAY, TAB 0 Refills         Reported         12/31/14       Sertraline HCl (Sertraline*) 50 Mg Tablet      25 MG PO QDAY, TAB 0 Refills         Reported         12/31/14      Current Medications      Current Medications Reviewed 8/27/18            Time of Call      14:49      Source of Call:  Patient            Reason for Call            Pt called about tamoxifen, confirmed with pt that provider sent Rx to      pharmacy. Pt has no further questions. JS            Guideline(s) Referenced      Yes            Verbalized Understanding      Caller verbalized, understanding of instructions given                 Disclaimer: Converted document may not contain table formatting or lab diagrams. Please see [email or web address] [facility] for the authenticated document.

## 2021-05-28 NOTE — PROGRESS NOTES
Patient: MISAEL THORNE     Acct: [id number]     Report: #[id number]  UNIT #: [id number]     : 1940    Encounter Date:10/24/2019  PRIMARY CARE: Willie Ly  ***Signed***  --------------------------------------------------------------------------------------------------------------------  NURSE INTAKE      Visit Type      Established Patient Visit            Chief Complaint      BREAST CA            History and Present Illness      Past Oncology Illness History      Chief Complaint: T1a N0 M0 Stage IA invasive ductal carcinoma the left breast,     ER positive IL positive HER-2/sherwin negative, status post lumpectomy on 2014            Ms. Thorne is a very pleasant 77-year-old  female who I saw in consult     on 14. On routine mammogram performed 10/06/2014 a suspicious irregular     microlobulated mass at the 12:00 position in the left breast was noted on     mammogram and ultrasound. 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 She    then underwent a left lumpectomy with sentinel lymph node evaluation on     2014. This showed a 1.2 cm invasive ductal carcinoma, grade 2, negative     margins with associated DCIS also with negative margins. 4 sentinel and non-    sentinel lymph nodes were removed and fortunately zero out of four were involved    with any metastatic deposit. She was then referred to my clinic for additional     discussion of workup and treatment options.             At her first appointment I sent her breast pathology for Oncotype analysis which    returned on 2015 with a recurrent score of 16 indicating a 10 year risk of    distant recurrence of approximately 10% utilizing endocrine therapy alone. This     placed her firmly in the low risk category. She also has seen radiation oncology     who given her age and early stage breast cancer recommended no adjuvant     radiation.            Interim History /DATE: 1/4/17      In the interim she has remained on Arimidex with no complications or side     effects. Bone densiometry done 2/9/15 was normal.  She denies any hot flashes.     No myalgias. No arthralgias. No diarrhea. No rash. She received a mammogram in     Oct 2016 which was benign. She is wanting to do her follow-up imaging through my    service moving forward.            She received a total knee replacement in the interim.            INTERIM HISTORY: June 14, 2017      Scheduled 3 month follow up she denies pain, new breast lumps, fever or     infections. Mammogram on 5/4/17 benign. DEXA is due. As noted she did not     receive any adjuvant XRT and continues on Arimidex.            INTERIM HISTORY: 10/10/17      Scheduled follow up for breast cancer and is s/p lumpectomy 2014. She denies new    pain or breast lumps. Her DEXA showed normal density. She denies any increasing     hot flashes.            OTHER:      MOST RECENT MAMMOGRAM:  5/4/17      MOST RECENT DEXA:  7/3/17            -May 9-2018.  WBC 8.6.  Hemoglobin 11.7.  Platelet count 208,000      -July .  Creatinine 1.8.  Sodium 145.            HPI - Oncology Interim      1) Invasive ductal carcinoma of the left breast: diagnosed 12/2014.             She is status post left lumpectomy in 12/2014. Completed radiation. ER, TN +.     Oncotype DX score 16. She will completed 5 years of AI therapy in December of     this year ( 2019). She reports she has tolerated Arimidex well without any     intolerable side effects. Today, we discussed the role of BCI (breast cancer     index testing) to see if she would benefit from additional 5 years of endocrine     therapy. At this time, patient is not interested in continuing the AI therapy     for an additional 5 years nor is she interested in having the BCI testing     completed either.              Last mammogram in March of 2019 was benign. Last bone density was normal in 7/ 2017.            Clinical Staging      T1a N0 M0 Stage IA invasive ductal carcinoma the left breast, ER positive CT     positive HER-2/sherwin negative, status post lumpectomy on 12/03/2014. Oncotype 16     (low risk group)            Clinical Trial Participant      No            ECOG Performance Status      0            PAST, FAMILY   Social History      Lives independently:  Yes            Tobacco Use      Tobacco status:  Never smoker            Alcohol Use      Alcohol intake:  None            Substance Use      Substance use:  Denies use            REVIEW OF SYSTEMS      General:  Denies: Appetite Change, Fatigue, Fever, Night Sweats, Weight Gain,     Weight Loss      Eye:  Denies Blurred Vision, Denies Corrective Lenses, Denies Diplopia, Denies     Vision Changes      ENT:  Denies Headache, Denies Hearing Loss, Denies Hoarseness, Denies Sore     Throat      Cardiovascular:  Denies Chest Pain, Denies Palpitations      Respiratory:  Denies: Coughing Blood, Productive Cough, Shortness of Air,     Wheezing      Gastrointestinal:  Denies Bloody Stools, Denies Constipation, Denies Diarrhea,     Denies Nausea/Vomiting, Denies Problem Swallowing, Denies Unable to Control     Bowels      Genitourinary:  Denies Blood in Urine, Denies Incontinence, Denies Painful     Urination      Musculoskeletal:  Denies Back Pain, Denies Muscle Pain, Denies Painful Joints      Integumentary:  Denies Itching, Denies Lesions, Denies Rash      Neurologic:  Denies Dizziness, Denies Numbness\Tingling, Denies Seizures      Psychiatric:  Denies Anxiety, Denies Depression      Endocrine:  Denies Cold Intolerance, Denies Heat Intolerance      Hematologic/Lymphatic:  Denies Bruising, Denies Bleeding, Denies Enlarged Lymph     Nodes      Reproductive:  Denies: Menopause, Heavy Periods, Pregnant, Still Menstruating            VITAL SIGNS AND SCORES      Vitals       Height 5 ft 3.5 in / 161.29 cm      Weight 229 lbs 15.036 oz / 104.3 kg      BSA 2.06 m2      BMI 40.1 kg/m2      Temperature 97.8 F / 36.56 C - Temporal      Pulse 45      Respirations 16      Blood Pressure 109/41 Sitting, Right Arm      Pulse Oximetry 97%, ROOM AIR            Pain Score      Experiencing any pain?:  No      Pain Scale Used:  Numerical      Pain Intensity:  0            Fatigue Score      Experiencing any fatigue?:  No      Fatigue (0-10 scale):  0 (none)            EXAM      General Appearance:  Positive for: Alert, Oriented x3, Cooperative      Eye:  Positive for: Moist Conjunctiva, PERRLA, Reactive to Light      HEENT:  Positive for: Oropharynx clear;          Negative for: Erythema      Neck:  Positive for: Full ROM, Supple      Respiratory:  Positive for: CTAB, Normal Respiratory Effort      Abdomen/Gastro:  Positive for: Normal Active Bowel Sounds, Soft;          Negative for: Distention, Tenderness      Cardiovascular:  Positive for: RRR;          Negative for: Murmur, Peripheral Edema      Skin:  Positive for: Normal Temperature, Normal Texture and Turgor, Normal Tone;             Negative for: Rash      Psychiatric:  Positive for: AAO X 3, Appropriate Affect, Intact Judgement      Neurologic:  Positive for: Cranial Ner II-XII Intact, Deep Tendon Reflexes;          Negative for: Dizziness      Genitourinary:  Negative for: Bladder Distention      Musculoskeletal:  Positive for: Full ROM Lower Extremety, Full ROM Upper     Extremety, Full Muscle Strength, Full Muscle Tone      Lower Extremities:  Positive for: Pedal Pulses Intact, Pedal Pulses Symetrical      Upper Extremities:  Negative for: Weakness      Lymphatic:  Negative for: Axillary, Cervical, Supraclavicular            PREVENTION      Date Influenza Vaccine Given:  Oct 1, 2018      Influenza Vaccine Declined:  No      2 or More Falls Past Year?:  No      Fall Past Year with Injury?:  No      Encouraged to follow-up with:  PCP  regarding preventative exams.      Chart initiated by      YESIKA ORTIZ MA            ALLERGY/MEDS      Allergies      Coded Allergies:             STATINS-HMG-COA REDUCTASE INHIBITOR (Verified  Allergy, Unknown, MUSCLE     CRAMPS, 10/24/19)      Uncoded Allergies:             HORMONES AND SYNTHETIC SUBSTITUTES (Allergy, Mild, 10/5/07)            Medications      Last Reconciled on 10/24/19 13:45 by CORI BARRERA      Anastrozole (Anastrozole) 1 Mg Tablet      1 MG PO QDAY for 90 Days, #90 TAB 1 Refill         Prov: CORI BARRERA onc         10/10/19       Doxazosin Mesylate (Cardura) 4 Mg Tablet      4 MG PO QDAY, #30 TAB         Reported         8/21/19       Insulin Glargine (Lantus SOLOSTAR) 100 Unit/1 Ml Insuln.pen      50 UNITS SUBQ HS, #1 BOX 0 Refills         Reported         8/21/19       Cholecalciferol (Vitamin D3*) 1,000 Unit Tab      1000 UNITS PO QDAY, #30 TAB 0 Refills         Reported         8/21/19       Carvedilol (Carvedilol) 12.5 Mg Tablet      12.5 MG PO BID, #60 TAB 0 Refills         Reported         6/14/17       Apixaban (Eliquis) 5 Mg Tab      5 MG PO BID, #60 TAB         Reported         6/21/16       glyBURIDE-metFORMIN 2.5-500 Mg (glyBURIDE-metFORMIN 2.5-500 Mg) 1 Tab Tablet      1 TAB PO BID MEALS, #60 TAB 0 Refills         Reported         6/21/16       Losartan/Hydrochlorothiazide (Losartan/Hctz 100/25 Mg) 1 Tab Tablet      1 TAB PO QDAY, #30 TAB 0 Refills         Reported         6/21/16       Diltiazem CD (Cardizem CD) 180 Mg Capcr      180 MG PO QDAY, #30 CAP.ER         Reported         6/21/16       hydrALAZINE HCl (Apresoline) 50 Mg Tab      100 MG PO BID, #30 TAB 0 Refills         Reported         3/8/16       sitaGLIPtin (Januvia*) 50 Mg Tablet      50 MG PO QDAY, TAB         Reported         11/19/15       Zolpidem Tartrate (Ambien) 5 Mg Tablet      5 MG PO HS PRN for SLEEP, TAB 0 Refills         Reported         12/31/14       Allopurinol (Allopurinol) 300 Mg  Tablet      300 MG PO QDAY, TAB 0 Refills         Reported         12/31/14      Medications Reviewed:  No Changes made to meds            IMPRESSION/PLAN      Impression      1) Invasive ductal carcinoma of the left breast: diagnosed 12/2014.             She is status post left lumpectomy in 12/2014. Completed radiation. ER, AR +.     Oncotype DX score 16. She will completed 5 years of AI therapy in December of     this year ( 2019). She reports she has tolerated Arimidex well without any     intolerable side effects. Today, we discussed the role of BCI (breast cancer     index testing) to see if she would benefit from additional 5 years of endocrine     therapy. At this time, patient is not interested in continuing the AI therapy     for an additional 5 years nor is she interested in having the BCI testing     completed either.             Last mammogram in March of 2019 was benign. Last bone density was normal in 7/ 2017.            Diagnosis      Aromatase inhibitor use - Z79.811            Breast cancer, left         Malignant neoplasm of upper-outer quadrant of left breast in female, estrogen       receptor positive         Breast location: upper outer quadrant of breast         Estrogen receptor status: positive         Patient sex: female            Invasive ductal carcinoma of breast, stage 1         Infiltrating ductal carcinoma of breast, stage 1, left         Laterality: left            Notes      New Diagnostics      * Screening Mammo, 5 Months         Dx: Aromatase inhibitor use - Z79.811      * Bone Densitometry DEXA, 5 Months         Dx: Aromatase inhibitor use - Z79.811            Plan      1) Patient may stop Arimidex in December after prescription runs out. She is not    interested in BCI testing at this time.             Bilateral mammograms ordered for March 2020            Dexa scan ordered for March 2020.             We will see her in March 2020 for these and then see her on a yearly  basis for     mammogram screening.             Follow up with NP in 5 months after mammo and dexa scan completed.            Patient Education            Anastrozole      [employer] X-ray Absorptiometry      Patient Education Provided:  Yes            Alcohol Counseling      Counseling given:  None            Substance Counseling      Counseling given:  None            Electronically signed by CORI BARRERA onc  10/24/2019 13:45       Disclaimer: Converted document may not contain table formatting or lab diagrams. Please see [employer] [facility] for the authenticated document.

## 2021-05-28 NOTE — TELEPHONE ENCOUNTER
Patient: MISAEL THORNE     Acct: [id number]     Report: #[id number]  UNIT #: [id number]     : 1940    Encounter Date:2018  PRIMARY CARE: Willie Ly  ***Signed***  --------------------------------------------------------------------------------------------------------------------  MD      Provider:  CAROLIN Shea            Allergies      Coded Allergies:             STATINS-HMG-COA REDUCTASE INHIBITOR (Verified  Allergy, Unknown, MUSCLE     CRAMPS, 17)      Uncoded Allergies:             HORMONES AND SYNTHETIC SUBSTITUTES (Allergy, Mild, 10/5/07)            Medications      Last Reconciled on 10/10/17 16:43 by JORDON GARLAND MD      Anastrozole (Arimidex*) 1 Mg Tablet      1 MG PO QDAY, #90 TAB         Prov: SIL SMITH onc         18       Anastrozole (Arimidex*) 1 Mg Tablet      1 MG PO QDAY, #90 TAB         Prov: SIL SMITH onc         18       Anastrozole (Arimidex*) 1 Mg Tablet      1 MG PO QDAY, #30 TAB 5 Refills         Prov: Yonathan Villarreal         18       (Lantus) Unknown Strength  No Conflict Check               Reported         10/10/17       DULoxetine (Cymbalta) 60 Mg Capsule.      60 MG PO QDAY, #30 CAP 0 Refills         Reported         17       Hydrocodone/Acetaminophen 7.5/325 MG (Norco 7.5/325 Mg) 1 Tab Tab      1 TAB PO Q4H PRN for BREAKTHROUGH PAIN, TAB 0 Refills         Reported         17       Trimethoprim/Sulfamethoxazole DS (Bactrim /160 MG*) 1 Each Tablet      1 TAB PO BID, TAB 0 Refills         Reported         17       Carvedilol (Carvedilol) 12.5 Mg Tablet      12.5 MG PO BID, #60 TAB 0 Refills         Reported         17       Anastrozole (Arimidex*) 1 Mg Tablet      1 MG PO QDAY, #90 TAB 3 Refills         Prov: Jordon Garland         17       Cyanocobalamin (Vitamin B-12*) 1,000 Mcg Tablet      1000 MCG PO QDAY, #30 TAB 0 Refills         Reported         10/25/16       Cholecalciferol (Vitamin D3*)  2,000 U Tablet      2000 UNITS PO QDAY, #30 TAB 0 Refills         Reported         10/25/16       Apixaban (Eliquis) 5 Mg Tab      5 MG PO BID, #60 TAB         Reported         6/21/16       glyBURIDE/metFORMIN 2.5/500 Mg* (Glucovance*) 1 Tab Tablet      1 TAB PO BID MEALS, #60 TAB 0 Refills         Reported         6/21/16       Losartan/Hydrochlorothiazide (Losartan/Hctz 100/25 Mg) 1 Tab Tablet      1 TAB PO QDAY, #30 TAB 0 Refills         Reported         6/21/16       Anastrozole (Anastrozole) 1 Mg Tab      1 MG PO QDAY, #30 TAB         Reported         6/21/16       Doxazosin Mesylate (Cardura) 4 Mg Tablet      4 MG PO QDAY, #30 TAB         Reported         6/21/16       Diltiazem CD (Cardizem CD) 180 Mg Capcr      180 MG PO QDAY, #30 CAP.ER         Reported         6/21/16       hydrALAZINE HCl (Apresoline) 50 Mg Tab      100 MG PO TID, #30 TAB 0 Refills         Reported         3/8/16       sitaGLIPtin (Januvia*) 50 Mg Tablet      50 MG PO QDAY, TAB         Reported         11/19/15       Zolpidem Tartrate (Ambien) 5 Mg Tablet      5 MG PO HS PRN for SLEEP, TAB 0 Refills         Reported         12/31/14       Allopurinol (Allopurinol) 300 Mg Tablet      300 MG PO QDAY, TAB 0 Refills         Reported         12/31/14       Sertraline HCl (Sertraline*) 50 Mg Tablet      25 MG PO QDAY, TAB 0 Refills         Reported         12/31/14      Current Medications      Current Medications Reviewed 8/29/18            Time of Call      15:01      Source of Call:  Patient            Reason for Call            Pt called to report arimidex was never called into pharmacy. Re-entered      [facility] into computer and resent as ordered previously by      Tiffany COE. Pt notified that      med was resent. Pt has no further questions. JS            Guideline(s) Referenced      Yes            Verbalized Understanding      Caller verbalized, understanding of instructions given                 Disclaimer: Converted  document may not contain table formatting or lab diagrams. Please see [employer] for the authenticated document.

## 2021-05-28 NOTE — PROGRESS NOTES
Patient: MISAEL THORNE     Acct: [id number]     Report: #[id number]  UNIT #: [id number]     : 1940    Encounter Date:10/10/2018  PRIMARY CARE: Willie Ly  ***Signed***  --------------------------------------------------------------------------------------------------------------------  Visit Type      Established Patient Visit            Chief Complaint      BREAST CANCER            Allergies      Coded Allergies:             STATINS-HMG-COA REDUCTASE INHIBITOR (Verified  Allergy, Unknown, MUSCLE     CRAMPS, 10/10/18)      Uncoded Allergies:             HORMONES AND SYNTHETIC SUBSTITUTES (Allergy, Mild, 10/5/07)            Medications      Last Reconciled on 10/13/18 14:56 by PURNIMA HENRIQUEZ MD      Anastrozole (Arimidex*) 1 Mg Tablet      1 MG PO QDAY, #90 TAB         Prov: SIL SMITH onc         18       (Lantus) Unknown Strength  No Conflict Check               Reported         10/10/17       Carvedilol (Carvedilol) 12.5 Mg Tablet      12.5 MG PO BID, #60 TAB 0 Refills         Reported         17       Cholecalciferol (Vitamin D3*) 2,000 U Tablet      2000 UNITS PO QDAY, #30 TAB 0 Refills         Reported         10/25/16       Apixaban (Eliquis) 5 Mg Tab      5 MG PO BID, #60 TAB         Reported         16       glyBURIDE/metFORMIN 2.5/500 Mg* (Glucovance*) 1 Tab Tablet      1 TAB PO BID MEALS, #60 TAB 0 Refills         Reported         16       Losartan/Hydrochlorothiazide (Losartan/Hctz 100/25 Mg) 1 Tab Tablet      1 TAB PO QDAY, #30 TAB 0 Refills         Reported         16       Diltiazem CD (Cardizem CD) 180 Mg Capcr      180 MG PO QDAY, #30 CAP.ER         Reported         16       hydrALAZINE HCl (Apresoline) 50 Mg Tab      100 MG PO TID, #30 TAB 0 Refills         Reported         3/8/16       sitaGLIPtin (Januvia*) 50 Mg Tablet      50 MG PO QDAY, TAB         Reported         11/19/15       Zolpidem Tartrate (Ambien) 5 Mg Tablet      5 MG PO HS PRN  for SLEEP, TAB 0 Refills         Reported         12/31/14       Allopurinol (Allopurinol) 300 Mg Tablet      300 MG PO QDAY, TAB 0 Refills         Reported         12/31/14       Sertraline HCl (Sertraline*) 50 Mg Tablet      25 MG PO QDAY, TAB 0 Refills         Reported         12/31/14      Medications Reviewed:  No Changes made to meds            History and Present Illness      Past Oncology Illness History      Chief Complaint: T1a N0 M0 Stage IA invasive ductal carcinoma the left breast,     ER positive MS positive HER-2/sherwin negative, status post lumpectomy on 12/03/2014            Ms. Dick is a very pleasant 77-year-old  female who I saw in consult     on 12/31/14. On routine mammogram performed 10/06/2014 a suspicious irregular     microlobulated mass at the 12:00 position in the left breast was noted on     mammogram and ultrasound. 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 She     then underwent a left lumpectomy with sentinel lymph node evaluation on     12/03/2014. This showed a 1.2 cm invasive ductal carcinoma, grade 2, negative     margins with associated DCIS also with negative margins. 4 sentinel and non-    sentinel lymph nodes were removed and fortunately zero out of four were involved     with any metastatic deposit. She was then referred to my clinic for additional     discussion of workup and treatment options.             At her first appointment I sent her breast pathology for Oncotype analysis which     returned on 01/14/2015 with a recurrent score of 16 indicating a 10 year risk o    f distant recurrence of approximately 10% utilizing endocrine therapy alone.     This placed her firmly in the low risk category. She also has seen radiation     oncology who given her age and early stage breast cancer recommended  no adjuvant     radiation.            Interim History /DATE: 1/4/17      In the interim she has remained on Arimidex with no complications or side     effects. Bone densiometry done 2/9/15 was normal.  She denies any hot flashes.     No myalgias. No arthralgias. No diarrhea. No rash. She received a mammogram in     Oct 2016 which was benign. She is wanting to do her follow-up imaging through my     service moving forward.            She received a total knee replacement in the interim.            INTERIM HISTORY: June 14, 2017      Scheduled 3 month follow up she denies pain, new breast lumps, fever or     infections. Mammogram on 5/4/17 benign. DEXA is due. As noted she did not     receive any adjuvant XRT and continues on Arimidex.            INTERIM HISTORY: 10/10/17      Scheduled follow up for breast cancer and is s/p lumpectomy 2014. She denies new     pain or breast lumps. Her DEXA showed normal density. She denies any increasing     hot flashes.            OTHER:      MOST RECENT MAMMOGRAM:  5/4/17      MOST RECENT DEXA:  7/3/17            -May 9-2018.  WBC 8.6.  Hemoglobin 11.7.  Platelet count 208,000      -July .  Creatinine 1.8.  Sodium 145.            HPI - Oncology Interim      Patient described chest pain has improved. Feels like sharp pain. No alleviating     factors. This is an intermittent pain, worsened with activities of daily     living, and no associated dyspnea.            Clinical Staging      T1a N0 M0 Stage IA invasive ductal carcinoma the left breast, ER positive ME     positive HER-2/sherwin negative, status post lumpectomy on 12/03/2014. Oncotype 16     (low risk group)            Clinical Trial Participant      No            ECOG Performance Status      0            PAST, FAMILY   Past Medical History      Past Medical History:  No Diabetes Type 1; Diabetes Type 2; No Thyroid Disease,     No COPD, No Emphysema; Hypertension; No Stroke, No High Cholesterol, No Heart     Attack, No  "Bleeding Condition, No Low or High RBC Count, No Low or High WBC     Count, No Low or High Platelet Coun, No Hepatitis, No Kidney Disease, No     Depression, No Alzheimer's Disease, No Mental Disease, No Seizures; Arthritis     (BILAT SHOULDERS, RIGHT SHOULDER IMPINGEMENT, ); No Osteoporosis, No Osteopenia;     Short of Air (SOA WITH EXERTION OFTEN); No Sleep apnea, No Liver Disease, No     STD, No Enlarged Prostate; Other (AFIB, ANXIETY, TUBAL LIGATION, )      Hematology/oncology:  REPORTS HX OF: Breast cancer, Skin cancer (PRE-CANCEROUS     SKIN LESIONS FROM FACE/NECK/ARMS/BACK REMOVED, ); DENIES HX OF: Previous     Treatment for CA, Anemia, Bladder Cancer, Blood cancer, Brain cancer, Cervical     cancer, Coagulopathy, Colorectal cancer, Endocrine cancer, Eye cancer, GI     cancer,  cancer, Kidney cancer, Leukemia, Leukocytosis, Leukopenia, Liver     cancer, Lung cancer, Lymphoma, Musculoskeletal cancer, Myeloma, Neurologic     cancer, Oral cancer, Ovarian cancer, Stomach cancer, Thrombocytopenia, Thyroid     cancer, Uterine cancer, Other cancer history, Other hematologic history      Genetic/metabolic:  DENIES HX OF: Cystic fibrosis, Down syndrome, Other genetic     history, Other metabolic history            Past Surgical History      REPORTS HX OF: Cataract extraction, Skin cancer removal, Breast biopsy,     Lumpectomy (LEFT BREAST), Biopsy (LEFT BREAST, ), Other Past Surgical Hx     (SURGERY TO RIGHT EAR RELATED TO \"HOLE\" IN EAR, RIGHT TKR, LEFT TKR,     COLONOSCOPY); DENIES HX OF: Thyroid surgery, Lung biopsy, CABG surgery, Coronary     stent, Valve replacement, Appendectomy, Cholecystectomy, Splenectomy, Bladder     surgery, Nephrectomy, Joint replacement, Frature repair, Melanoma excision,     Spinal surgery, Mastectomy, bilateral, Mastectomy, right, Mastectomy, left,     Hysterectomy, Peg Tube Placement, VAD Placement            Family History      REPORTS HX OF: Breast cancer (SISTER), Leukemia " (MOTHER), Stomach Cancer     (FATHER); DENIES HX OF: Anemia, Blood disorders, Blood Cancer, Cervical cancer,     Coagulopathy, Colorectal cancer, Endocrine Cancer, Eye Cancer, GI Cancer,      Cancer, Kidney Cancer, Leukocytosis, Leukopenia, Liver Cancer, Lung cancer,     Lymphoma, Melanoma, Musculoskeletal Cancer, Myeloma, Neurologic Cancer, Oral     Cancer, Ovarian cancer, Prostate cancer, Skin Cancer, Testicular Cancer,     Thrombocytopenia, Thyroid cancer, Uterine cancer, Other Cancer History, Other     Hematology History            Social History      Lives independently:  Yes            Tobacco Use      Tobacco status:  Never smoker            Alcohol Use      Alcohol intake:  None            Substance Use      Substance use:  Denies use            REVIEW OF SYSTEMS      General:  Denies: Appetite change, Excessive sweating, Fatigue, Fever, Night     sweats, Weight gain, Weight loss, Other      Eyes:  Denies: Blurred vision, Corrective lenses, Diplopia, Eye irritation, Eye     pain, Eye redness, Spots in vision, Vision loss, Other      Ears, nose, mouth, throat:  Denies: Headache, Seizures, Visual Changes, Hearing     loss, Sinus Congestion, Hoarseness, Sore throat, Other      Cardiovascular:  Denies: Chest pain, Irregular heartbeat, Palpitations, Swollen     ankles/legs, Other      Respiratory:  Denies: Chest pain, Shortness of Air, Productive cough, Coughing     blood, Other      Gastrointestinal:  Denies: Nausea, Vomiting, Problem swallowing, Frequent     heartburn, Constipation, Diarrhea, Tarry stools, Bloody stools, Unable to     control bowels, Other      Kidney/Bladder:  Denies: Painful Urination, Change in urinary stream, Blood in     urine, Incontinence, Frequent Urination, Decreased urine stream, Other      Musculoskeletal:  Denies: New Back pain, Leg Cramps, Painful Joints, Swollen     Joints, Muscle Pain, Muscle weakness, Other      Skin:  DENIES: Jaundice, Easy Bleeding, Lesions/changes in moles,  Nail changes,     Skin Discoloration, Rash, Other      Neurological:  Denies: Dizziness, Fainting, Numbness\Tingling, Paralysis,     Seizures, Other      Psychiatric:  Complains of: AAO X 3; Denies: Anxiety, Panic attacks, Depression,     Memory loss, Other      Endocrine:  DiabetesThyroid DisorderOsteoporosisEndocrine Other      Hematologic/lymphatic:  Denies: Bruising, Bleeding, Enlarged Lymph Nodes,     Recurrent infections, Other      Reproductive:  Denies Pregnant, Denies Menopause, Denies Still Menstruating,     Denies Heavy Periods, Denies Other            VITAL SIGNS,PAIN/FATIGUE SCORE      Vitals      Height 5 ft 3.5 in / 161.29 cm      Weight 232 lbs 9.365 oz / 105.5 kg      BSA 2.07 m2      BMI 40.6 kg/m2      Temperature 97.0 F / 36.11 C - Temporal      Pulse 86      Blood Pressure 126/60 Sitting, Left Arm      Pulse Oximetry 98%, ROOM AIR            Pain Score      Experiencing any pain?:  No      Pain Scale Used:  Numerical      Pain Intensity:  0            Fatigue Score      Experiencing any fatigue?:  No      Fatigue (0-10 scale):  0 (none)            EXAM      General Appearance:  Alert, Oriented X3, Cooperative      Eyes:  Anicteric Sclerae, Moist Conjunctiva      HEENT:  Orophraynx clear, No Erythema, No Exudates      Neck:  Supple, Full ROM      Respiratory:  CTAB; No Diminished Breath      Abdomen:  Bowel Sounds, Distention      Cardio:  RRR, No Murmur, No, Peripheral Edema      Skin:  Normal Temperature, Normal Tone      Psychiatric:  Appropriate Affect, Intact Judgement      Neuro:  Cranial Ner II-XII Intact, No Focal Sensory Deficit      Muscularskeletal:  Normal Gait and Station, Full ROM of extremeties      Extremities:  No Digital Cyanosis, No Digital Ischemia            PREVENTION      Hx Influenza Vaccination:  Yes      Date Influenza Vaccine Given:  Oct 1, 2018      Influenza Vaccine Declined:  No      2 or More Falls Past Year?:  No      Fall Past Year with Injury?:  No      Hx  Pneumococcal Vaccination:  Yes      Encouraged to follow-up with:  PCP regarding preventative exams.      Chart initiated by      CASI BURRELL CMA            IMPRESSION/PLAN      Diagnosis      Breast cancer, left - C50.912      -T1a N0 M0 Stage IA invasive ductal carcinoma the left breast, ER positive [place]     positive HER-2/sherwin negative, status post lumpectomy on 12/03/2014      -Continue Arimidex      -No signs and symptoms of cancer recurrence            Diabetes - E11.9      -Continue antidiabetic medication      -Stable            Hypertension - I10      -Asymptomatic on today's visit.       -Continue blood pressure medications.             Insomnia - G47.00      -Continue Ambien            Notes      -Patient's blood tests, physicians' notes, and medications were reviewed today     to assess patient's medical treatment plan.      -Old medical records were reviewed and summarized in chronological order in the     HPI today to maintain an updated medical record.      Discontinued Medications      * ANASTROZOLE (Arimidex*) 1 MG TABLET: 1 MG PO QDAY #90      * ANASTROZOLE (Arimidex*) 1 MG TABLET: 1 MG PO QDAY #30      * ANASTROZOLE (Arimidex*) 1 MG TABLET: 1 MG PO QDAY #90      New Diagnostics      * Screening Mammo, As Soon As Possible         Dx: Breast screening - Z12.31            Plan      -Return to clinic 6 months.      -Today's Plan.  CBC CMP.            Patient Education      Patient Education Provided:  Yes                 Disclaimer: Converted document may not contain table formatting or lab diagrams. Please see [facility] for the authenticated document.

## 2021-07-14 RX ORDER — LOSARTAN POTASSIUM AND HYDROCHLOROTHIAZIDE 25; 100 MG/1; MG/1
1 TABLET ORAL DAILY
Qty: 90 TABLET | Refills: 1 | Status: SHIPPED | OUTPATIENT
Start: 2021-07-14 | End: 2021-08-03 | Stop reason: SDUPTHER

## 2021-07-14 NOTE — TELEPHONE ENCOUNTER
Pt needs a refill on Eliquis and Losartan to [facility], it is ready for you to send in. Thanks.

## 2021-08-03 RX ORDER — INSULIN GLARGINE 100 [IU]/ML
INJECTION, SOLUTION SUBCUTANEOUS
COMMUNITY
End: 2021-08-03 | Stop reason: SDUPTHER

## 2021-08-03 RX ORDER — INSULIN GLARGINE 100 [IU]/ML
60 INJECTION, SOLUTION SUBCUTANEOUS NIGHTLY
Qty: 30 ML | Refills: 5 | Status: SHIPPED | OUTPATIENT
Start: 2021-08-03 | End: 2022-03-02 | Stop reason: SDUPTHER

## 2021-08-03 RX ORDER — LOSARTAN POTASSIUM AND HYDROCHLOROTHIAZIDE 25; 100 MG/1; MG/1
1 TABLET ORAL DAILY
Qty: 90 TABLET | Refills: 1 | Status: SHIPPED | OUTPATIENT
Start: 2021-08-03 | End: 2022-04-25 | Stop reason: SDUPTHER

## 2021-08-03 RX ORDER — HYDRALAZINE HYDROCHLORIDE 25 MG/1
25 TABLET, FILM COATED ORAL 3 TIMES DAILY
Qty: 270 TABLET | Refills: 2 | Status: SHIPPED | OUTPATIENT
Start: 2021-08-03 | End: 2022-04-25 | Stop reason: SDUPTHER

## 2021-09-12 NOTE — PROGRESS NOTES
"Chief Complaint/ HPI:   Follow-up (5 month), Hypertension, Anemia, and Shortness of Breath  No falls     Objective   Vital Signs  Vitals:    09/14/21 1405   BP: 133/75   BP Location: Right arm   Patient Position: Sitting   Cuff Size: Adult   Pulse: 77   Resp: 18   Temp: 97.1 °F (36.2 °C)   TempSrc: Tympanic   SpO2: 93%   Weight: 107 kg (235 lb)   Height: 166.4 cm (65.5\")      Body mass index is 38.51 kg/m².  Review of Systems   Constitutional: Negative.    HENT: Negative.    Eyes: Negative.    Respiratory: Negative.    Cardiovascular: Negative.    Gastrointestinal: Negative.    Endocrine: Negative.    Genitourinary: Negative.    Musculoskeletal: Negative.    Allergic/Immunologic: Negative.    Neurological: Negative.    Hematological: Negative.    Psychiatric/Behavioral: Negative.       Physical Exam  Constitutional:       General: She is not in acute distress.     Appearance: Normal appearance.   HENT:      Head: Normocephalic.      Mouth/Throat:      Mouth: Mucous membranes are moist.   Eyes:      Conjunctiva/sclera: Conjunctivae normal.      Pupils: Pupils are equal, round, and reactive to light.   Cardiovascular:      Rate and Rhythm: Normal rate and regular rhythm.      Pulses: Normal pulses.      Heart sounds: Murmur (1-2/ 6 sys ) heard.     Pulmonary:      Effort: Pulmonary effort is normal.      Breath sounds: Normal breath sounds.   Abdominal:      General: Bowel sounds are normal.      Palpations: Abdomen is soft.   Musculoskeletal:         General: No swelling. Normal range of motion.      Cervical back: Neck supple.   Skin:     General: Skin is warm and dry.      Coloration: Skin is not jaundiced.   Neurological:      General: No focal deficit present.      Mental Status: She is alert and oriented to person, place, and time. Mental status is at baseline.   Psychiatric:         Mood and Affect: Mood normal.         Behavior: Behavior normal.         Thought Content: Thought content normal.         " Judgment: Judgment normal.      abd obese, soft nt   Result Review :   Lab Results   Component Value Date    PROBNP 823.1 09/13/2021     01/09/2020     CMP    CMP 11/2/20 4/12/21 9/13/21   Glucose   94   Glucose 78 126 (A)    BUN 32 (A) 38 (A) 23   Creatinine 1.66 (A) 1.91 (A) 1.52 (A)   eGFR Non African Am   33 (A)   Sodium 142 139 143   Potassium 3.8 4.3 4.2   Chloride 110 109 110 (A)   Calcium 9.8 9.9 9.7   Albumin 3.7 3.6 3.80   Total Bilirubin 0.23 0.21 0.2   Alkaline Phosphatase 134 122 105   AST (SGOT) 18 19 20   ALT (SGPT) 18 16 18   (A) Abnormal value       Comments are available for some flowsheets but are not being displayed.           CBC w/diff    CBC w/Diff 11/2/20 4/12/21   WBC 8.51 8.16   RBC 4.01 (A) 3.86 (A)   Hemoglobin 11.1 (A) 10.5 (A)   Hematocrit 34.4 (A) 33.4 (A)   MCV 85.8 86.5   MCH 27.7 27.2   MCHC 32.3 (A) 31.4 (A)   RDW 15.9 (A) 16.3 (A)   Platelets 216 209   Neutrophil Rel % 58.6 53.1   Lymphocyte Rel % 31.1 36.6   Monocyte Rel % 6.7 7.1   Eosinophil Rel % 2.7 2.6   Basophil Rel % 0.5 0.2   (A) Abnormal value             Lipid Panel    Lipid Panel 4/12/21   Total Cholesterol 217 (A)   Triglycerides 341 (A)   HDL Cholesterol 41   VLDL Cholesterol 68 (A)   LDL Cholesterol  108 (A)   (A) Abnormal value       Comments are available for some flowsheets but are not being displayed.            Lab Results   Component Value Date    TSH 2.450 09/13/2021    TSH 2.960 04/12/2021    TSH 3.520 07/13/2020      Lab Results   Component Value Date    FREET4 1.25 09/13/2021      A1C Last 3 Results    HGBA1C Last 3 Results 11/2/20 4/12/21 9/13/21   Hemoglobin A1C 8.0 (A) 6.8 (A) 6.80 (A)   (A) Abnormal value       Comments are available for some flowsheets but are not being displayed.                             No diagnosis found.    Assessment and Plan      ALLERGIES ----COUGH , BETTER , CXR shows a 1.6 cm rounded nodule right middle lobe near the diaphragm, October 27, 2020 --- follow-up CAT  scan does not show any evidence of this , she does have some trace pleural effusions and a left parapelvic or renal cyst versus mild hydronephrosis incompletely visualized on the current CT scan November 2020     renal cyst versus mass, ultrasound of the kidneys November 2020 showed simple renal cysts no further workup,    Heart murmur,----- echocardiogram shows only trace mitral regurgitation and normal ejection fraction October 2020    Anemia, --,, down to 10.5 April 12, 2021, recommend over-the-counter iron twice a day with vitamin C twice a day, previously improved hemoglobin 11.1 November 2020, -----------prior- colonoscopy August 2019 with Dr. Lopez multiple polyps one possibly causing, losing blood, hemoglobin 9.8, iron levels. ---May 2019 will set up for IV iron infusions September 24, 2019--Patient had slight reaction at the end of the infusion but did get some of it, --continue current over-the-counter medicines, had a reaction at the end of her last IV iron infusion does not want to get another one discussed September 2021    Subclinical hypothyroid--- Synthroid 0.05 mg daily     Lower extremity edema shortness of breath, ,  stable September 2021 ---Lasix 40 mg daily when necessary    s/p L TKA, 4/16,     chronic A. 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 8 April 2021 and stable September 2021,, previously up to 8.0 November 2020 - Lantus 60 units daily, Januvia 50 mg  daily,, Glucovance 2.5\500 mg twice a day,     EYES CHECK BLOOM / BENNET --MARCH 2021    ELEVATED LFTS, BETTER --ALT , AST , BOTH NL NOW, May 2019, September 2019 ,JAN 2020, In July 2020, normal, November 2020, September 2021    VIT D DEF continues on replacement September 2021    ELEVATED CHOL--patient intolerant of statins discussed,    INSOMNIA, ON OTC BENADRYL     CKD 3 - CREAT Up to 1.8, J, to 1.9, stable over the past 1-2 years, as of April 2021, improved down to 1.5 September 2021, continue current treatment plan,    Follow Up   No follow-ups on file.  Patient was given instructions and counseling regarding her condition or for health maintenance advice. Please see specific information pulled into the AVS if appropriate.

## 2021-09-13 ENCOUNTER — LAB (OUTPATIENT)
Dept: LAB | Facility: HOSPITAL | Age: 81
End: 2021-09-13

## 2021-09-13 ENCOUNTER — TRANSCRIBE ORDERS (OUTPATIENT)
Dept: INTERNAL MEDICINE | Facility: CLINIC | Age: 81
End: 2021-09-13

## 2021-09-13 DIAGNOSIS — I10 ESSENTIAL HYPERTENSION, MALIGNANT: Primary | ICD-10-CM

## 2021-09-13 DIAGNOSIS — E11.9 DIABETES MELLITUS WITHOUT COMPLICATION (HCC): ICD-10-CM

## 2021-09-13 DIAGNOSIS — D64.9 ANEMIA, UNSPECIFIED TYPE: ICD-10-CM

## 2021-09-13 DIAGNOSIS — R06.02 SHORTNESS OF BREATH: ICD-10-CM

## 2021-09-13 DIAGNOSIS — I10 ESSENTIAL HYPERTENSION, MALIGNANT: ICD-10-CM

## 2021-09-13 DIAGNOSIS — R53.83 TIREDNESS: ICD-10-CM

## 2021-09-13 LAB
ALBUMIN SERPL-MCNC: 3.8 G/DL (ref 3.5–5.2)
ALBUMIN/GLOB SERPL: 1.4 G/DL
ALP SERPL-CCNC: 105 U/L (ref 39–117)
ALT SERPL W P-5'-P-CCNC: 18 U/L (ref 1–33)
ANION GAP SERPL CALCULATED.3IONS-SCNC: 11.7 MMOL/L (ref 5–15)
AST SERPL-CCNC: 20 U/L (ref 1–32)
BASOPHILS # BLD AUTO: 0.03 10*3/MM3 (ref 0–0.2)
BASOPHILS NFR BLD AUTO: 0.4 % (ref 0–1.5)
BILIRUB SERPL-MCNC: 0.2 MG/DL (ref 0–1.2)
BUN SERPL-MCNC: 23 MG/DL (ref 8–23)
BUN/CREAT SERPL: 15.1 (ref 7–25)
CALCIUM SPEC-SCNC: 9.7 MG/DL (ref 8.6–10.5)
CHLORIDE SERPL-SCNC: 110 MMOL/L (ref 98–107)
CO2 SERPL-SCNC: 21.3 MMOL/L (ref 22–29)
CREAT SERPL-MCNC: 1.52 MG/DL (ref 0.57–1)
DEPRECATED RDW RBC AUTO: 47.6 FL (ref 37–54)
EOSINOPHIL # BLD AUTO: 0.19 10*3/MM3 (ref 0–0.4)
EOSINOPHIL NFR BLD AUTO: 2.7 % (ref 0.3–6.2)
ERYTHROCYTE [DISTWIDTH] IN BLOOD BY AUTOMATED COUNT: 15.6 % (ref 12.3–15.4)
FERRITIN SERPL-MCNC: 27.7 NG/ML (ref 13–150)
GFR SERPL CREATININE-BSD FRML MDRD: 33 ML/MIN/1.73
GLOBULIN UR ELPH-MCNC: 2.8 GM/DL
GLUCOSE SERPL-MCNC: 94 MG/DL (ref 65–99)
HBA1C MFR BLD: 6.8 % (ref 4.8–5.6)
HCT VFR BLD AUTO: 31.2 % (ref 34–46.6)
HGB BLD-MCNC: 10.1 G/DL (ref 12–15.9)
IMM GRANULOCYTES # BLD AUTO: 0.04 10*3/MM3 (ref 0–0.05)
IMM GRANULOCYTES NFR BLD AUTO: 0.6 % (ref 0–0.5)
IRON 24H UR-MRATE: 44 MCG/DL (ref 37–145)
LYMPHOCYTES # BLD AUTO: 2.43 10*3/MM3 (ref 0.7–3.1)
LYMPHOCYTES NFR BLD AUTO: 35 % (ref 19.6–45.3)
MCH RBC QN AUTO: 27.4 PG (ref 26.6–33)
MCHC RBC AUTO-ENTMCNC: 32.4 G/DL (ref 31.5–35.7)
MCV RBC AUTO: 84.6 FL (ref 79–97)
MONOCYTES # BLD AUTO: 0.46 10*3/MM3 (ref 0.1–0.9)
MONOCYTES NFR BLD AUTO: 6.6 % (ref 5–12)
NEUTROPHILS NFR BLD AUTO: 3.8 10*3/MM3 (ref 1.7–7)
NEUTROPHILS NFR BLD AUTO: 54.7 % (ref 42.7–76)
NRBC BLD AUTO-RTO: 0 /100 WBC (ref 0–0.2)
NT-PROBNP SERPL-MCNC: 823.1 PG/ML (ref 0–1800)
PLATELET # BLD AUTO: 201 10*3/MM3 (ref 140–450)
PMV BLD AUTO: 10.9 FL (ref 6–12)
POTASSIUM SERPL-SCNC: 4.2 MMOL/L (ref 3.5–5.2)
PROT SERPL-MCNC: 6.6 G/DL (ref 6–8.5)
RBC # BLD AUTO: 3.69 10*6/MM3 (ref 3.77–5.28)
SODIUM SERPL-SCNC: 143 MMOL/L (ref 136–145)
T4 FREE SERPL-MCNC: 1.25 NG/DL (ref 0.93–1.7)
TSH SERPL DL<=0.05 MIU/L-ACNC: 2.45 UIU/ML (ref 0.27–4.2)
WBC # BLD AUTO: 6.95 10*3/MM3 (ref 3.4–10.8)

## 2021-09-13 PROCEDURE — 85025 COMPLETE CBC W/AUTO DIFF WBC: CPT

## 2021-09-13 PROCEDURE — 36415 COLL VENOUS BLD VENIPUNCTURE: CPT

## 2021-09-13 PROCEDURE — 83880 ASSAY OF NATRIURETIC PEPTIDE: CPT

## 2021-09-13 PROCEDURE — 83540 ASSAY OF IRON: CPT

## 2021-09-13 PROCEDURE — 83036 HEMOGLOBIN GLYCOSYLATED A1C: CPT

## 2021-09-13 PROCEDURE — 84439 ASSAY OF FREE THYROXINE: CPT

## 2021-09-13 PROCEDURE — 82728 ASSAY OF FERRITIN: CPT

## 2021-09-13 PROCEDURE — 84443 ASSAY THYROID STIM HORMONE: CPT

## 2021-09-13 PROCEDURE — 80053 COMPREHEN METABOLIC PANEL: CPT

## 2021-09-14 ENCOUNTER — OFFICE VISIT (OUTPATIENT)
Dept: INTERNAL MEDICINE | Facility: CLINIC | Age: 81
End: 2021-09-14

## 2021-09-14 VITALS
OXYGEN SATURATION: 93 % | TEMPERATURE: 97.1 F | WEIGHT: 235 LBS | BODY MASS INDEX: 37.77 KG/M2 | SYSTOLIC BLOOD PRESSURE: 133 MMHG | DIASTOLIC BLOOD PRESSURE: 75 MMHG | HEIGHT: 66 IN | RESPIRATION RATE: 18 BRPM | HEART RATE: 77 BPM

## 2021-09-14 DIAGNOSIS — E55.9 VITAMIN D DEFICIENCY: ICD-10-CM

## 2021-09-14 DIAGNOSIS — D50.9 IRON DEFICIENCY ANEMIA, UNSPECIFIED IRON DEFICIENCY ANEMIA TYPE: Primary | ICD-10-CM

## 2021-09-14 DIAGNOSIS — E06.3 HYPOTHYROIDISM DUE TO HASHIMOTO'S THYROIDITIS: ICD-10-CM

## 2021-09-14 DIAGNOSIS — E03.8 HYPOTHYROIDISM DUE TO HASHIMOTO'S THYROIDITIS: ICD-10-CM

## 2021-09-14 DIAGNOSIS — N18.31 STAGE 3A CHRONIC KIDNEY DISEASE (HCC): ICD-10-CM

## 2021-09-14 DIAGNOSIS — I48.11 LONGSTANDING PERSISTENT ATRIAL FIBRILLATION (HCC): ICD-10-CM

## 2021-09-14 DIAGNOSIS — I10 HYPERTENSION, ESSENTIAL: ICD-10-CM

## 2021-09-14 DIAGNOSIS — E13.9 DIABETES 1.5, MANAGED AS TYPE 2 (HCC): ICD-10-CM

## 2021-09-14 DIAGNOSIS — E66.01 MORBID (SEVERE) OBESITY DUE TO EXCESS CALORIES (HCC): ICD-10-CM

## 2021-09-14 DIAGNOSIS — R74.8 ELEVATED LIVER ENZYMES: ICD-10-CM

## 2021-09-14 PROCEDURE — 99214 OFFICE O/P EST MOD 30 MIN: CPT | Performed by: INTERNAL MEDICINE

## 2021-09-14 RX ORDER — IBUPROFEN 800 MG/1
TABLET ORAL
COMMUNITY
End: 2022-04-07

## 2021-09-14 RX ORDER — MONTELUKAST SODIUM 10 MG/1
TABLET ORAL EVERY 24 HOURS
COMMUNITY
Start: 2021-04-13 | End: 2022-04-07

## 2021-09-14 RX ORDER — GLYBURIDE 2.5 MG/1
TABLET ORAL
COMMUNITY
End: 2022-04-07

## 2021-09-14 RX ORDER — FERROUS SULFATE 325(65) MG
325 TABLET ORAL 2 TIMES DAILY
Qty: 180 TABLET | Refills: 3 | Status: SHIPPED | OUTPATIENT
Start: 2021-09-14 | End: 2022-04-07

## 2021-09-14 RX ORDER — LEVOTHYROXINE SODIUM 0.05 MG/1
50 TABLET ORAL
COMMUNITY
End: 2022-04-25 | Stop reason: SDUPTHER

## 2021-09-14 RX ORDER — LEVOCETIRIZINE DIHYDROCHLORIDE 5 MG/1
TABLET, FILM COATED ORAL EVERY 24 HOURS
COMMUNITY
Start: 2021-04-13 | End: 2022-04-07

## 2021-09-14 RX ORDER — COLCHICINE 0.6 MG/1
TABLET ORAL
COMMUNITY
End: 2022-04-07

## 2021-09-14 RX ORDER — ZOLPIDEM TARTRATE 5 MG/1
TABLET ORAL
COMMUNITY
End: 2021-10-06 | Stop reason: SDUPTHER

## 2021-09-14 RX ORDER — ANASTROZOLE 1 MG/1
TABLET ORAL
COMMUNITY
End: 2021-11-18 | Stop reason: SDUPTHER

## 2021-09-14 RX ORDER — ASPIRIN 81 MG/1
TABLET ORAL
COMMUNITY
End: 2022-04-07

## 2021-09-14 RX ORDER — INSULIN GLARGINE 100 [IU]/ML
INJECTION, SOLUTION SUBCUTANEOUS
COMMUNITY
End: 2021-11-18 | Stop reason: SDUPTHER

## 2021-09-14 RX ORDER — MELOXICAM 15 MG/1
TABLET ORAL
COMMUNITY
End: 2022-04-07

## 2021-09-14 RX ORDER — DILTIAZEM HYDROCHLORIDE 180 MG/1
CAPSULE, EXTENDED RELEASE ORAL
COMMUNITY
End: 2022-04-25 | Stop reason: SDUPTHER

## 2021-09-14 RX ORDER — SITAGLIPTIN 50 MG/1
50 TABLET, FILM COATED ORAL DAILY
COMMUNITY
Start: 2021-07-11 | End: 2022-04-27 | Stop reason: SDUPTHER

## 2021-10-06 DIAGNOSIS — F51.01 PRIMARY INSOMNIA: Primary | ICD-10-CM

## 2021-10-06 RX ORDER — ZOLPIDEM TARTRATE 5 MG/1
5 TABLET ORAL NIGHTLY PRN
Qty: 90 TABLET | Refills: 0 | Status: SHIPPED | OUTPATIENT
Start: 2021-10-06 | End: 2022-01-04 | Stop reason: SDUPTHER

## 2021-10-21 RX ORDER — GLYBURIDE-METFORMIN HYDROCHLORIDE 2.5; 5 MG/1; MG/1
1 TABLET ORAL 2 TIMES DAILY WITH MEALS
Qty: 180 TABLET | Refills: 1 | Status: SHIPPED | OUTPATIENT
Start: 2021-10-21 | End: 2022-04-25 | Stop reason: SDUPTHER

## 2021-11-18 ENCOUNTER — OFFICE VISIT (OUTPATIENT)
Dept: INTERNAL MEDICINE | Facility: CLINIC | Age: 81
End: 2021-11-18

## 2021-11-18 VITALS
HEIGHT: 66 IN | WEIGHT: 238 LBS | RESPIRATION RATE: 18 BRPM | SYSTOLIC BLOOD PRESSURE: 122 MMHG | BODY MASS INDEX: 38.25 KG/M2 | DIASTOLIC BLOOD PRESSURE: 56 MMHG | HEART RATE: 91 BPM | OXYGEN SATURATION: 98 % | TEMPERATURE: 97.3 F

## 2021-11-18 DIAGNOSIS — M54.50 ACUTE LOW BACK PAIN WITHOUT SCIATICA, UNSPECIFIED BACK PAIN LATERALITY: Primary | ICD-10-CM

## 2021-11-18 DIAGNOSIS — M54.50 ACUTE LEFT-SIDED LOW BACK PAIN WITHOUT SCIATICA: Primary | ICD-10-CM

## 2021-11-18 PROCEDURE — 99213 OFFICE O/P EST LOW 20 MIN: CPT

## 2021-11-18 RX ORDER — TRAMADOL HYDROCHLORIDE 50 MG/1
50 TABLET ORAL EVERY 6 HOURS PRN
Qty: 30 TABLET | Refills: 0 | Status: SHIPPED | OUTPATIENT
Start: 2021-11-18 | End: 2022-04-07

## 2021-11-18 RX ORDER — BACLOFEN 10 MG/1
10 TABLET ORAL 3 TIMES DAILY
Qty: 45 TABLET | Refills: 0 | Status: SHIPPED | OUTPATIENT
Start: 2021-11-18 | End: 2022-04-07

## 2021-11-18 NOTE — PROGRESS NOTES
"Chief Complaint  Back Pain (About a week and a half, more previlant when picking things up. Lower back.)    Subjective          History of Present Illness  Luzma Dick presents to [facility]  About a week and half ago, she was picking up her great grandson, and she feels like she maybe strained her back.   She states that when she gets up is when the pain is worse. She states that it is only on the left side.    She denies any dysuria, frequency, or fevers.     Objective   Vital Signs:   /56 (BP Location: Right arm, Patient Position: Sitting, Cuff Size: Large Adult)   Pulse 91   Temp 97.3 °F (36.3 °C) (Temporal)   Resp 18   Ht 166.4 cm (65.5\")   Wt 108 kg (238 lb)   SpO2 98%   BMI 39.00 kg/m²     Physical Exam  HENT:      Head: Normocephalic and atraumatic.   Eyes:      Extraocular Movements: Extraocular movements intact.      Conjunctiva/sclera: Conjunctivae normal.   Cardiovascular:      Rate and Rhythm: Normal rate and regular rhythm.      Pulses: Normal pulses.   Pulmonary:      Effort: Pulmonary effort is normal. No respiratory distress.      Breath sounds: Normal breath sounds. No stridor. No wheezing, rhonchi or rales.   Abdominal:      General: Bowel sounds are normal.      Palpations: Abdomen is soft.   Musculoskeletal:      Cervical back: Normal range of motion.      Lumbar back: Tenderness (mild to left lower back) present. Negative right straight leg raise test and negative left straight leg raise test.   Skin:     General: Skin is warm and dry.   Neurological:      Mental Status: She is alert and oriented to person, place, and time.   Psychiatric:         Mood and Affect: Mood normal.         Behavior: Behavior normal.         Thought Content: Thought content normal.         Judgment: Judgment normal.        Result Review :                 Assessment and Plan    Diagnoses and all orders for this visit:    1. Acute left-sided low back pain without " sciatica (Primary)  Assessment & Plan:  Patient states for about a week and a half she has had left sided lower back pain.  She states she was lifting of her 7-month-old grandson and thinks she pulled a muscle.  She denies any urinary symptoms including dysuria, frequency or fever chills.  She states the pain is just in her left lower back does not radiate anywhere.  Pain is worse with standing up.  She states she is been taking Tylenol for pain and helps some but is not relieving the discomfort.  Patient states she would like some medication to help.  Plan: I will give patient some baclofen 10 mg 3 times daily as needed.  I have also going to discuss with Dr. Ly to see if he would want to give her some tramadol for a few days.  Patient is to call the office if pain persists or does not improve.  Discussed with her she could apply heat and to stretch.      Other orders  -     baclofen (LIORESAL) 10 MG tablet; Take 1 tablet by mouth 3 (Three) Times a Day.  Dispense: 45 tablet; Refill: 0      Follow Up   Return if symptoms worsen or fail to improve.  Patient was given instructions and counseling regarding her condition or for health maintenance advice. Please see specific information pulled into the AVS if appropriate.

## 2021-11-18 NOTE — ASSESSMENT & PLAN NOTE
Patient states for about a week and a half she has had left sided lower back pain.  She states she was lifting of her 7-month-old grandson and thinks she pulled a muscle.  She denies any urinary symptoms including dysuria, frequency or fever chills.  She states the pain is just in her left lower back does not radiate anywhere.  Pain is worse with standing up.  She states she is been taking Tylenol for pain and helps some but is not relieving the discomfort.  Patient states she would like some medication to help.  Plan: I will give patient some baclofen 10 mg 3 times daily as needed.  I have also going to discuss with Dr. Ly to see if he would want to give her some tramadol for a few days.  Patient is to call the office if pain persists or does not improve.  Discussed with her she could apply heat and to stretch.

## 2021-12-02 RX ORDER — CARVEDILOL 12.5 MG/1
12.5 TABLET ORAL 2 TIMES DAILY WITH MEALS
Qty: 180 TABLET | Refills: 1 | Status: SHIPPED | OUTPATIENT
Start: 2021-12-02 | End: 2022-06-21 | Stop reason: SDUPTHER

## 2022-01-04 DIAGNOSIS — F51.01 PRIMARY INSOMNIA: ICD-10-CM

## 2022-01-04 RX ORDER — ZOLPIDEM TARTRATE 5 MG/1
5 TABLET ORAL NIGHTLY PRN
Qty: 90 TABLET | Refills: 1 | Status: SHIPPED | OUTPATIENT
Start: 2022-01-04 | End: 2022-07-08

## 2022-01-11 NOTE — TELEPHONE ENCOUNTER
Caller: Luzma Dick    Relationship: Self    Best call back number: [phone]     Requested Prescriptions:   Requested Prescriptions     Pending Prescriptions Disp Refills   • apixaban (ELIQUIS) 5 MG tablet tablet 180 tablet 1     Sig: Take 1 tablet by mouth.        [facility] [place] [place]  [address] [facility] - [phone]  - [phone] FX     Additional details provided by patient: PATIENT STATES SHE IS COMPLETELY OUT OF THIS MEDICATION     Does the patient have less than a 3 day supply:  [x] Yes  [] No    Shelbi Alston Rep   01/11/22 15:33 EST

## 2022-03-02 RX ORDER — INSULIN GLARGINE 100 [IU]/ML
60 INJECTION, SOLUTION SUBCUTANEOUS NIGHTLY
Qty: 30 ML | Refills: 5 | Status: SHIPPED | OUTPATIENT
Start: 2022-03-02 | End: 2022-03-07 | Stop reason: SDUPTHER

## 2022-03-02 NOTE — TELEPHONE ENCOUNTER
Caller: Luzma Dick    Relationship: Self    Best call back number: [phone] KATHERINE'S NUMBER (SON)    Requested Prescriptions:   Requested Prescriptions     Pending Prescriptions Disp Refills   • insulin glargine (Lantus) 100 UNIT/ML injection 30 mL 5     Sig: Inject 60 Units under the skin into the appropriate area as directed Every Night.   ALSO FREE STYLE TEST STRIPS      Pharmacy where request should be sent:  [facility]     Additional details provided by patient:   Does the patient have less than a 3 day supply:  [x] Yes  [] No    Shelbi MERCER Rep   03/02/22 14:37 EST

## 2022-03-07 RX ORDER — INSULIN GLARGINE 100 [IU]/ML
60 INJECTION, SOLUTION SUBCUTANEOUS NIGHTLY
Qty: 30 ML | Refills: 5 | Status: SHIPPED | OUTPATIENT
Start: 2022-03-07 | End: 2023-04-04 | Stop reason: SDUPTHER

## 2022-03-07 NOTE — TELEPHONE ENCOUNTER
The pt called needing a refill of lantus and test strips sent to [facility], but they were sent to [place]. Evelin pended the medications for you to send to [facility].

## 2022-03-11 ENCOUNTER — LAB (OUTPATIENT)
Dept: LAB | Facility: HOSPITAL | Age: 82
End: 2022-03-11

## 2022-03-11 DIAGNOSIS — E06.3 HYPOTHYROIDISM DUE TO HASHIMOTO'S THYROIDITIS: ICD-10-CM

## 2022-03-11 DIAGNOSIS — D50.9 IRON DEFICIENCY ANEMIA, UNSPECIFIED IRON DEFICIENCY ANEMIA TYPE: ICD-10-CM

## 2022-03-11 DIAGNOSIS — E66.01 MORBID (SEVERE) OBESITY DUE TO EXCESS CALORIES: ICD-10-CM

## 2022-03-11 DIAGNOSIS — E13.9 DIABETES 1.5, MANAGED AS TYPE 2: ICD-10-CM

## 2022-03-11 DIAGNOSIS — R74.8 ELEVATED LIVER ENZYMES: ICD-10-CM

## 2022-03-11 DIAGNOSIS — E55.9 VITAMIN D DEFICIENCY: ICD-10-CM

## 2022-03-11 DIAGNOSIS — I10 HYPERTENSION, ESSENTIAL: ICD-10-CM

## 2022-03-11 DIAGNOSIS — I48.11 LONGSTANDING PERSISTENT ATRIAL FIBRILLATION: ICD-10-CM

## 2022-03-11 DIAGNOSIS — N18.31 STAGE 3A CHRONIC KIDNEY DISEASE: ICD-10-CM

## 2022-03-11 DIAGNOSIS — E03.8 HYPOTHYROIDISM DUE TO HASHIMOTO'S THYROIDITIS: ICD-10-CM

## 2022-03-11 LAB
ALBUMIN SERPL-MCNC: 3.8 G/DL (ref 3.5–5.2)
ALBUMIN/GLOB SERPL: 1.3 G/DL
ALP SERPL-CCNC: 117 U/L (ref 39–117)
ALT SERPL W P-5'-P-CCNC: 13 U/L (ref 1–33)
ANION GAP SERPL CALCULATED.3IONS-SCNC: 11.9 MMOL/L (ref 5–15)
AST SERPL-CCNC: 15 U/L (ref 1–32)
BASOPHILS # BLD AUTO: 0.02 10*3/MM3 (ref 0–0.2)
BASOPHILS NFR BLD AUTO: 0.3 % (ref 0–1.5)
BILIRUB SERPL-MCNC: 0.3 MG/DL (ref 0–1.2)
BUN SERPL-MCNC: 40 MG/DL (ref 8–23)
BUN/CREAT SERPL: 21.4 (ref 7–25)
CALCIUM SPEC-SCNC: 9.3 MG/DL (ref 8.6–10.5)
CHLORIDE SERPL-SCNC: 108 MMOL/L (ref 98–107)
CO2 SERPL-SCNC: 23.1 MMOL/L (ref 22–29)
CREAT SERPL-MCNC: 1.87 MG/DL (ref 0.57–1)
DEPRECATED RDW RBC AUTO: 43.9 FL (ref 37–54)
EGFRCR SERPLBLD CKD-EPI 2021: 26.8 ML/MIN/1.73
EOSINOPHIL # BLD AUTO: 0.26 10*3/MM3 (ref 0–0.4)
EOSINOPHIL NFR BLD AUTO: 3.6 % (ref 0.3–6.2)
ERYTHROCYTE [DISTWIDTH] IN BLOOD BY AUTOMATED COUNT: 14.7 % (ref 12.3–15.4)
GLOBULIN UR ELPH-MCNC: 2.9 GM/DL
GLUCOSE SERPL-MCNC: 109 MG/DL (ref 65–99)
HBA1C MFR BLD: 6.7 % (ref 4.8–5.6)
HCT VFR BLD AUTO: 33.3 % (ref 34–46.6)
HGB BLD-MCNC: 11.1 G/DL (ref 12–15.9)
IMM GRANULOCYTES # BLD AUTO: 0.02 10*3/MM3 (ref 0–0.05)
IMM GRANULOCYTES NFR BLD AUTO: 0.3 % (ref 0–0.5)
IRON 24H UR-MRATE: 44 MCG/DL (ref 37–145)
IRON SATN MFR SERPL: 11 % (ref 20–50)
LYMPHOCYTES # BLD AUTO: 2.78 10*3/MM3 (ref 0.7–3.1)
LYMPHOCYTES NFR BLD AUTO: 38.2 % (ref 19.6–45.3)
MCH RBC QN AUTO: 27.8 PG (ref 26.6–33)
MCHC RBC AUTO-ENTMCNC: 33.3 G/DL (ref 31.5–35.7)
MCV RBC AUTO: 83.3 FL (ref 79–97)
MONOCYTES # BLD AUTO: 0.49 10*3/MM3 (ref 0.1–0.9)
MONOCYTES NFR BLD AUTO: 6.7 % (ref 5–12)
NEUTROPHILS NFR BLD AUTO: 3.7 10*3/MM3 (ref 1.7–7)
NEUTROPHILS NFR BLD AUTO: 50.9 % (ref 42.7–76)
NRBC BLD AUTO-RTO: 0 /100 WBC (ref 0–0.2)
PLATELET # BLD AUTO: 195 10*3/MM3 (ref 140–450)
PMV BLD AUTO: 10.6 FL (ref 6–12)
POTASSIUM SERPL-SCNC: 4.2 MMOL/L (ref 3.5–5.2)
PROT SERPL-MCNC: 6.7 G/DL (ref 6–8.5)
RBC # BLD AUTO: 4 10*6/MM3 (ref 3.77–5.28)
SODIUM SERPL-SCNC: 143 MMOL/L (ref 136–145)
TIBC SERPL-MCNC: 396 MCG/DL (ref 298–536)
TRANSFERRIN SERPL-MCNC: 266 MG/DL (ref 200–360)
WBC NRBC COR # BLD: 7.27 10*3/MM3 (ref 3.4–10.8)

## 2022-03-11 PROCEDURE — 83036 HEMOGLOBIN GLYCOSYLATED A1C: CPT

## 2022-03-11 PROCEDURE — 80053 COMPREHEN METABOLIC PANEL: CPT

## 2022-03-11 PROCEDURE — 84466 ASSAY OF TRANSFERRIN: CPT

## 2022-03-11 PROCEDURE — 85025 COMPLETE CBC W/AUTO DIFF WBC: CPT

## 2022-03-11 PROCEDURE — 83540 ASSAY OF IRON: CPT

## 2022-03-11 PROCEDURE — 36415 COLL VENOUS BLD VENIPUNCTURE: CPT

## 2022-03-14 ENCOUNTER — OFFICE VISIT (OUTPATIENT)
Dept: INTERNAL MEDICINE | Facility: CLINIC | Age: 82
End: 2022-03-14

## 2022-03-14 VITALS
TEMPERATURE: 97.2 F | SYSTOLIC BLOOD PRESSURE: 127 MMHG | OXYGEN SATURATION: 95 % | HEIGHT: 66 IN | WEIGHT: 230.8 LBS | HEART RATE: 84 BPM | BODY MASS INDEX: 37.09 KG/M2 | DIASTOLIC BLOOD PRESSURE: 60 MMHG

## 2022-03-14 DIAGNOSIS — E55.9 VITAMIN D DEFICIENCY: ICD-10-CM

## 2022-03-14 DIAGNOSIS — R74.8 ELEVATED LIVER ENZYMES: ICD-10-CM

## 2022-03-14 DIAGNOSIS — Z12.31 SCREENING MAMMOGRAM FOR BREAST CANCER: ICD-10-CM

## 2022-03-14 DIAGNOSIS — I48.11 LONGSTANDING PERSISTENT ATRIAL FIBRILLATION: ICD-10-CM

## 2022-03-14 DIAGNOSIS — E13.9 DIABETES 1.5, MANAGED AS TYPE 2: ICD-10-CM

## 2022-03-14 DIAGNOSIS — N18.31 STAGE 3A CHRONIC KIDNEY DISEASE: ICD-10-CM

## 2022-03-14 DIAGNOSIS — E06.3 HYPOTHYROIDISM DUE TO HASHIMOTO'S THYROIDITIS: ICD-10-CM

## 2022-03-14 DIAGNOSIS — E66.01 MORBID (SEVERE) OBESITY DUE TO EXCESS CALORIES: Primary | ICD-10-CM

## 2022-03-14 DIAGNOSIS — E03.8 HYPOTHYROIDISM DUE TO HASHIMOTO'S THYROIDITIS: ICD-10-CM

## 2022-03-14 DIAGNOSIS — I10 HYPERTENSION, ESSENTIAL: ICD-10-CM

## 2022-03-14 DIAGNOSIS — D50.9 IRON DEFICIENCY ANEMIA, UNSPECIFIED IRON DEFICIENCY ANEMIA TYPE: ICD-10-CM

## 2022-03-14 DIAGNOSIS — F51.01 PRIMARY INSOMNIA: ICD-10-CM

## 2022-03-14 PROCEDURE — 99214 OFFICE O/P EST MOD 30 MIN: CPT | Performed by: INTERNAL MEDICINE

## 2022-03-14 RX ORDER — FAMOTIDINE 20 MG/1
20 TABLET, FILM COATED ORAL 2 TIMES DAILY
Qty: 60 TABLET | Refills: 5 | Status: SHIPPED | OUTPATIENT
Start: 2022-03-14 | End: 2023-04-04

## 2022-03-14 RX ORDER — HYDROXYZINE HYDROCHLORIDE 10 MG/1
10 TABLET, FILM COATED ORAL 3 TIMES DAILY PRN
Qty: 60 TABLET | Refills: 2 | Status: SHIPPED | OUTPATIENT
Start: 2022-03-14 | End: 2023-04-04

## 2022-03-14 RX ORDER — ESCITALOPRAM OXALATE 5 MG/1
5 TABLET ORAL DAILY
Qty: 30 TABLET | Refills: 5 | Status: SHIPPED | OUTPATIENT
Start: 2022-03-14 | End: 2023-04-04 | Stop reason: SDUPTHER

## 2022-03-14 NOTE — PROGRESS NOTES
"Chief Complaint/ HPI:     Itching all over in am , all day  Nerves worse ---  Take care of children     I fall ,    Objective   Vital Signs  Vitals:    03/14/22 1443   BP: 127/60   Pulse: 84   Temp: 97.2 °F (36.2 °C)   SpO2: 95%   Weight: 105 kg (230 lb 12.8 oz)   Height: 166.4 cm (65.51\")      Body mass index is 37.81 kg/m².  Review of Systems   Constitutional: Negative.    HENT: Negative.    Eyes: Negative.    Respiratory: Negative.    Cardiovascular: Negative.    Gastrointestinal: Negative.    Endocrine: Negative.    Genitourinary: Negative.    Musculoskeletal: Negative.    Allergic/Immunologic: Negative.    Neurological: Negative.    Hematological: Negative.    Psychiatric/Behavioral: Negative.  Positive for stress.    urticaria    Physical Exam  Constitutional:       General: She is not in acute distress.     Appearance: Normal appearance. She is obese.   HENT:      Head: Normocephalic.      Mouth/Throat:      Mouth: Mucous membranes are moist.   Eyes:      Conjunctiva/sclera: Conjunctivae normal.      Pupils: Pupils are equal, round, and reactive to light.   Cardiovascular:      Rate and Rhythm: Normal rate and regular rhythm.      Pulses: Normal pulses.      Heart sounds: Murmur (2/6 systolic murmur left upper right upper sternal border,) heard.   Pulmonary:      Effort: Pulmonary effort is normal.      Breath sounds: Normal breath sounds.   Abdominal:      General: Bowel sounds are normal.      Palpations: Abdomen is soft.   Musculoskeletal:         General: No swelling. Normal range of motion.      Cervical back: Neck supple.   Skin:     General: Skin is warm and dry.      Coloration: Skin is not jaundiced.   Neurological:      General: No focal deficit present.      Mental Status: She is alert and oriented to person, place, and time. Mental status is at baseline.   Psychiatric:         Mood and Affect: Mood normal.         Behavior: Behavior normal.         Thought Content: Thought content normal.         " Judgment: Judgment normal.        Result Review :   Lab Results   Component Value Date    PROBNP 823.1 09/13/2021     01/09/2020     CMP    CMP 4/12/21 9/13/21 3/11/22   Glucose 126 (A) 94 109 (A)   BUN 38 (A) 23 40 (A)   Creatinine 1.91 (A) 1.52 (A) 1.87 (A)   eGFR Non African Am  33 (A)    Sodium 139 143 143   Potassium 4.3 4.2 4.2   Chloride 109 110 (A) 108 (A)   Calcium 9.9 9.7 9.3   Albumin 3.6 3.80 3.80   Total Bilirubin 0.21 0.2 0.3   Alkaline Phosphatase 122 105 117   AST (SGOT) 19 20 15   ALT (SGPT) 16 18 13   (A) Abnormal value       Comments are available for some flowsheets but are not being displayed.           CBC w/diff    CBC w/Diff 4/12/21 9/13/21 3/11/22   WBC 8.16 6.95 7.27   RBC 3.86 (A) 3.69 (A) 4.00   Hemoglobin 10.5 (A) 10.1 (A) 11.1 (A)   Hematocrit 33.4 (A) 31.2 (A) 33.3 (A)   MCV 86.5 84.6 83.3   MCH 27.2 27.4 27.8   MCHC 31.4 (A) 32.4 33.3   RDW 16.3 (A) 15.6 (A) 14.7   Platelets 209 201 195   Neutrophil Rel % 53.1 54.7 50.9   Immature Granulocyte Rel %  0.6 (A) 0.3   Lymphocyte Rel % 36.6 35.0 38.2   Monocyte Rel % 7.1 6.6 6.7   Eosinophil Rel % 2.6 2.7 3.6   Basophil Rel % 0.2 0.4 0.3   (A) Abnormal value             Lipid Panel    Lipid Panel 4/12/21   Total Cholesterol 217 (A)   Triglycerides 341 (A)   HDL Cholesterol 41   VLDL Cholesterol 68 (A)   LDL Cholesterol  108 (A)   (A) Abnormal value       Comments are available for some flowsheets but are not being displayed.            Lab Results   Component Value Date    TSH 2.450 09/13/2021    TSH 2.960 04/12/2021    TSH 3.520 07/13/2020      Lab Results   Component Value Date    FREET4 1.25 09/13/2021      A1C Last 3 Results    HGBA1C Last 3 Results 4/12/21 9/13/21 3/11/22   Hemoglobin A1C 6.8 (A) 6.80 (A) 6.70 (A)   (A) Abnormal value       Comments are available for some flowsheets but are not being displayed.                             Visit Diagnoses:    ICD-10-CM ICD-9-CM   1. Morbid (severe) obesity due to excess calories  (HCC)  E66.01 278.01   2. Iron deficiency anemia, unspecified iron deficiency anemia type  D50.9 280.9   3. Primary insomnia  F51.01 307.42   4. Hypothyroidism due to Hashimoto's thyroiditis  E03.8 244.8    E06.3 245.2   5. Stage 3a chronic kidney disease (HCC)  N18.31 585.3   6. Longstanding persistent atrial fibrillation (HCC)  I48.11 427.31   7. Vitamin D deficiency  E55.9 268.9   8. Hypertension, essential  I10 401.9   9. Elevated liver enzymes  R74.8 790.5   10. Diabetes 1.5, managed as type 2 (HCC)  E13.9 250.00   11. Screening mammogram for breast cancer  Z12.31 V76.12       Assessment and Plan   Diagnoses and all orders for this visit:    1. Morbid (severe) obesity due to excess calories (HCC) (Primary)  -     Hemoglobin A1c; Future  -     Comprehensive Metabolic Panel; Future  -     CBC & Differential; Future  -     Iron Profile; Future  -     Lipid Panel; Future  -     Uric Acid; Future  -     Mammo Screening Digital Tomosynthesis Bilateral With CAD; Future  -     Adult Transthoracic Echo Complete W/ Cont if Necessary Per Protocol; Future    2. Iron deficiency anemia, unspecified iron deficiency anemia type  -     Hemoglobin A1c; Future  -     Comprehensive Metabolic Panel; Future  -     CBC & Differential; Future  -     Iron Profile; Future  -     Lipid Panel; Future  -     Uric Acid; Future  -     Mammo Screening Digital Tomosynthesis Bilateral With CAD; Future  -     Adult Transthoracic Echo Complete W/ Cont if Necessary Per Protocol; Future    3. Primary insomnia  -     Hemoglobin A1c; Future  -     Comprehensive Metabolic Panel; Future  -     CBC & Differential; Future  -     Iron Profile; Future  -     Lipid Panel; Future  -     Uric Acid; Future  -     Mammo Screening Digital Tomosynthesis Bilateral With CAD; Future  -     Adult Transthoracic Echo Complete W/ Cont if Necessary Per Protocol; Future    4. Hypothyroidism due to Hashimoto's thyroiditis  -     Hemoglobin A1c; Future  -     Comprehensive  Metabolic Panel; Future  -     CBC & Differential; Future  -     Iron Profile; Future  -     Lipid Panel; Future  -     Uric Acid; Future  -     Mammo Screening Digital Tomosynthesis Bilateral With CAD; Future  -     Adult Transthoracic Echo Complete W/ Cont if Necessary Per Protocol; Future    5. Stage 3a chronic kidney disease (HCC)  -     Hemoglobin A1c; Future  -     Comprehensive Metabolic Panel; Future  -     CBC & Differential; Future  -     Iron Profile; Future  -     Lipid Panel; Future  -     Uric Acid; Future  -     Mammo Screening Digital Tomosynthesis Bilateral With CAD; Future  -     Adult Transthoracic Echo Complete W/ Cont if Necessary Per Protocol; Future    6. Longstanding persistent atrial fibrillation (HCC)  -     Hemoglobin A1c; Future  -     Comprehensive Metabolic Panel; Future  -     CBC & Differential; Future  -     Iron Profile; Future  -     Lipid Panel; Future  -     Uric Acid; Future  -     Mammo Screening Digital Tomosynthesis Bilateral With CAD; Future  -     Adult Transthoracic Echo Complete W/ Cont if Necessary Per Protocol; Future    7. Vitamin D deficiency  -     Hemoglobin A1c; Future  -     Comprehensive Metabolic Panel; Future  -     CBC & Differential; Future  -     Iron Profile; Future  -     Lipid Panel; Future  -     Uric Acid; Future  -     Mammo Screening Digital Tomosynthesis Bilateral With CAD; Future  -     Adult Transthoracic Echo Complete W/ Cont if Necessary Per Protocol; Future    8. Hypertension, essential  -     Hemoglobin A1c; Future  -     Comprehensive Metabolic Panel; Future  -     CBC & Differential; Future  -     Iron Profile; Future  -     Lipid Panel; Future  -     Uric Acid; Future  -     Mammo Screening Digital Tomosynthesis Bilateral With CAD; Future  -     Adult Transthoracic Echo Complete W/ Cont if Necessary Per Protocol; Future    9. Elevated liver enzymes  -     Hemoglobin A1c; Future  -     Comprehensive Metabolic Panel; Future  -     CBC &  Differential; Future  -     Iron Profile; Future  -     Lipid Panel; Future  -     Uric Acid; Future  -     Mammo Screening Digital Tomosynthesis Bilateral With CAD; Future  -     Adult Transthoracic Echo Complete W/ Cont if Necessary Per Protocol; Future    10. Diabetes 1.5, managed as type 2 (HCC)  -     Hemoglobin A1c; Future  -     Comprehensive Metabolic Panel; Future  -     CBC & Differential; Future  -     Iron Profile; Future  -     Lipid Panel; Future  -     Uric Acid; Future  -     Mammo Screening Digital Tomosynthesis Bilateral With CAD; Future  -     Adult Transthoracic Echo Complete W/ Cont if Necessary Per Protocol; Future    11. Screening mammogram for breast cancer  -     Hemoglobin A1c; Future  -     Comprehensive Metabolic Panel; Future  -     CBC & Differential; Future  -     Iron Profile; Future  -     Lipid Panel; Future  -     Uric Acid; Future  -     Mammo Screening Digital Tomosynthesis Bilateral With CAD; Future  -     Adult Transthoracic Echo Complete W/ Cont if Necessary Per Protocol; Future    Increased stress with home life stressors at home with family situation, will treat with Lexapro 5 mg daily, patient complains of urticaria itching all over all the time thinks it stress related, will start with some Pepcid and some hydroxyzine low doses, March 14, 2022    ALLERGIES ----COUGH , BETTER , CXR shows a 1.6 cm rounded nodule right middle lobe near the diaphragm, October 27, 2020 --- follow-up CAT scan does not show any evidence of this , she does have some trace pleural effusions and a left parapelvic or renal cyst versus mild hydronephrosis incompletely visualized on the CT scan November 2020     renal cyst versus mass, ultrasound of the kidneys November 2020 showed simple renal cysts no further workup,    Heart murmur,----- echocardiogram shows only trace mitral regurgitation and normal ejection fraction October 2020    Anemia, --,cont iron twice a day with vitamin C twice a day, -  colonoscopy August 2019 with Dr. Lopez multiple polyps one possibly causing, losing blood, hemoglobin 9.8, iron levels. ---May 2019 will set up for IV iron infusions September 24, 2019--Patient had slight reaction at the end of the infusion but did get some of it, --    Subclinical hypothyroid---cont  Synthroid 0.05 mg daily     Lower extremity edema shortness of breath, ,  stable September 2021 --cont -Lasix 40 mg daily when necessary    s/p L TKA, 4/16,     chronic A. 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 8 April 2021 and stable September 2021,, previously up to 8.0 November 2020 -    Cont Lantus 60 units daily, Januvia 50 mg daily,, Glucovance 2.5\500 mg twice a day,     EYES CHECK BLOOM / B[facility]NET --MARCH 2021    ELEVATED LFTS, BETTER --ALT , AST , BOTH NL NOW, May 2019, September 2019 ,JAN 2020, In July 2020, normal, November 2020, September 2021    VIT D DEF continues on replacement September 2021    ELEVATED CHOL--patient intolerant of statins discussed,    INSOMNIA, ON OTC BENADRYL     CKD 3 - CREAT Up to 1.8, , to 1.9, stable over the past 1-2 years, as of April 2021,             Follow Up   No follow-ups on file.  Patient was given instructions and counseling regarding her condition or for health maintenance advice. Please see specific information pulled into the AVS if appropriate.

## 2022-03-24 ENCOUNTER — TELEPHONE (OUTPATIENT)
Dept: INTERNAL MEDICINE | Facility: CLINIC | Age: 82
End: 2022-03-24

## 2022-03-24 RX ORDER — METHYLPREDNISOLONE 4 MG/1
TABLET ORAL
Qty: 21 EACH | Refills: 0 | Status: SHIPPED | OUTPATIENT
Start: 2022-03-24 | End: 2022-03-29

## 2022-03-24 NOTE — TELEPHONE ENCOUNTER
Caller: Luzma Dick    Relationship: Self    Best call back number: [phone]    What is the best time to reach you: ANYTIME     Who are you requesting to speak with (clinical staff, provider,  specific staff member): CLINICAL        What was the call regarding:     THE PATIENT STATED THE MEDICATION THAT WAS GIVEN TO HER FOR HER ITCHING IS NOT HELPING. SHE SAID SHE IS TAKING ALL 3 OF THEM AND SHE FEELS WORSE. SHE IS WANTING TO KNOW IF PCP GLO CAN GIVE HER SOMETHING ELSE. SHE IS REQUESTING A CALL TO DISCUSS.     PLEASE CALL AND ADVISE       Do you require a callback: YES

## 2022-03-24 NOTE — TELEPHONE ENCOUNTER
, Tell her I will call in a Medrol Dosepak, tell her to continue the Pepcid twice a day, and she can stop taking hydroxyzine, tell her to also start taking a Claritin tablet twice a day which is over-the-counter

## 2022-03-31 ENCOUNTER — TELEPHONE (OUTPATIENT)
Dept: INTERNAL MEDICINE | Facility: CLINIC | Age: 82
End: 2022-03-31

## 2022-04-01 ENCOUNTER — TELEPHONE (OUTPATIENT)
Dept: INTERNAL MEDICINE | Facility: CLINIC | Age: 82
End: 2022-04-01

## 2022-04-01 ENCOUNTER — CLINICAL SUPPORT (OUTPATIENT)
Dept: INTERNAL MEDICINE | Facility: CLINIC | Age: 82
End: 2022-04-01

## 2022-04-01 DIAGNOSIS — L30.9 ACUTE DERMATITIS: Primary | ICD-10-CM

## 2022-04-01 PROCEDURE — 96372 THER/PROPH/DIAG INJ SC/IM: CPT | Performed by: INTERNAL MEDICINE

## 2022-04-01 RX ORDER — METHYLPREDNISOLONE ACETATE 80 MG/ML
40 INJECTION, SUSPENSION INTRA-ARTICULAR; INTRALESIONAL; INTRAMUSCULAR; SOFT TISSUE ONCE
Status: COMPLETED | OUTPATIENT
Start: 2022-04-01 | End: 2022-04-01

## 2022-04-01 RX ADMIN — METHYLPREDNISOLONE ACETATE 40 MG: 80 INJECTION, SUSPENSION INTRA-ARTICULAR; INTRALESIONAL; INTRAMUSCULAR; SOFT TISSUE at 16:02

## 2022-04-01 NOTE — TELEPHONE ENCOUNTER
I spoke with patient's daughter at length about continued itching, we will do a Depo-Medrol shot today, told her to stop taking allopurinol and colchicine for now, and to make sure she is taking Zyrtec twice a day Pepcid twice a day for now and if it is not working she is going to need a Derm consult,      I told the patient to come in today to get a Depo-Medrol shot 40 mg IM x1

## 2022-04-04 ENCOUNTER — TELEPHONE (OUTPATIENT)
Dept: INTERNAL MEDICINE | Facility: CLINIC | Age: 82
End: 2022-04-04

## 2022-04-04 NOTE — TELEPHONE ENCOUNTER
Caller: Luzma Dick    Relationship: Self    Best call back number: [phone]     What medication are you requesting: SOMETHING FOR HER NERVES     What are your current symptoms: ITCHING ALL OVER     How long have you been experiencing symptoms: FOR ALMOST 6 WEEKS NOW      Have you had these symptoms before:    [] Yes  [x] No    Have you been treated for these symptoms before:   [] Yes  [x] No    If a prescription is needed, what is your preferred pharmacy and phone number:    [facility] - [place] - [address] - [phone] [facility] [phone]       Additional note:PATIENT STATES THAT THE STEROIDS DO NOT HELP

## 2022-04-05 NOTE — TELEPHONE ENCOUNTER
Caller: Luzma Dick    Relationship: Self    Best call back number: [phone]    What is the best time to reach you: ANYTIME    Who are you requesting to speak with (clinical staff, provider,  specific staff member): CLINICAL FOR AYLIN    What was the call regarding: STATUS OF PRIOR REQUEST    Do you require a callback: YES

## 2022-04-05 NOTE — TELEPHONE ENCOUNTER
She has Atarax/hydroxyzine on her med list.  If she is out of this, pend the prescription and I will be happy to sign it.  However, if Atarax is just not helping her, she will have to wait to Dr. Ly come back to see what he recommends.

## 2022-04-07 ENCOUNTER — OFFICE VISIT (OUTPATIENT)
Dept: INTERNAL MEDICINE | Facility: CLINIC | Age: 82
End: 2022-04-07

## 2022-04-07 VITALS
SYSTOLIC BLOOD PRESSURE: 128 MMHG | BODY MASS INDEX: 35.84 KG/M2 | HEART RATE: 73 BPM | RESPIRATION RATE: 20 BRPM | HEIGHT: 66 IN | WEIGHT: 223 LBS | DIASTOLIC BLOOD PRESSURE: 73 MMHG | OXYGEN SATURATION: 98 %

## 2022-04-07 DIAGNOSIS — F41.9 ANXIETY: ICD-10-CM

## 2022-04-07 DIAGNOSIS — L29.9 PRURITUS: Primary | ICD-10-CM

## 2022-04-07 DIAGNOSIS — L30.9 DERMATITIS: ICD-10-CM

## 2022-04-07 PROBLEM — C50.919 BREAST CANCER: Status: ACTIVE | Noted: 2022-04-07

## 2022-04-07 PROBLEM — M19.90 ARTHRITIS: Status: ACTIVE | Noted: 2022-04-07

## 2022-04-07 PROBLEM — E11.9 DIABETES: Status: ACTIVE | Noted: 2022-04-07

## 2022-04-07 PROCEDURE — 99214 OFFICE O/P EST MOD 30 MIN: CPT

## 2022-04-07 RX ORDER — TRIAMCINOLONE ACETONIDE 5 MG/G
1 CREAM TOPICAL 2 TIMES DAILY
Qty: 454 G | Refills: 0 | Status: SHIPPED | OUTPATIENT
Start: 2022-04-07 | End: 2023-04-04

## 2022-04-07 NOTE — PROGRESS NOTES
"Chief Complaint  Itching (Patient has been itching for the last 5 weeks, Malachi has given patient steroid shots with no relief. Patient has not had any new lotions, perfumes, laundry detergent. No new medications. Patient has experienced red rashes that come and go.)    Subjective          History of Present Illness  Luzma Dick presents to [facility]  With complaints of itching and a rash. She states that the rash started 2-3 days ago all over but the itching has been present for 5 weeks. She states that she is miserable. She thinks it is her nerves. She was started on lexapro 5 mg but states it did not work. She only gave it a week. Explained those medications take time to build up in her system. She was also started on pepcid 20 mg BID and atarax 10 mg TID. She states that it is not helping. She has also tried medrol dose jluis and steroid injection. She is not getting any relief.     She denies any new fabric softeners, soaps, lotions etc.       She is very stressed/anxious.     Hydroxyzine makes her a little drowsy.       Objective   Vital Signs:   /73 (BP Location: Right arm, Patient Position: Sitting, Cuff Size: Adult)   Pulse 73   Resp 20   Ht 166.4 cm (65.51\")   Wt 101 kg (223 lb)   SpO2 98%   BMI 36.53 kg/m²     Physical Exam  HENT:      Head: Normocephalic.   Eyes:      Extraocular Movements: Extraocular movements intact.      Conjunctiva/sclera: Conjunctivae normal.   Cardiovascular:      Rate and Rhythm: Normal rate.      Pulses: Normal pulses.   Pulmonary:      Effort: Pulmonary effort is normal.      Breath sounds: Normal breath sounds.   Abdominal:      General: Bowel sounds are normal.      Palpations: Abdomen is soft.   Musculoskeletal:      Cervical back: Normal range of motion.   Skin:     Findings: Rash present.      Comments: Abdomen, arms     Neurological:      Mental Status: She is alert.        Result Review :{Labs  Result Review  Imaging "  Med Tab  Media  Procedures :23}                 Assessment and Plan    Diagnoses and all orders for this visit:    1. Pruritus (Primary)  -     Ambulatory Referral to Dermatology    2. Dermatitis  Assessment & Plan:  Patient complains of itching and a rash.  The itching has been present for 5 weeks.  She states that she is miserable.  She states that she broke out in a rash about 2 to 3 days ago and it started on her trunk but is now on her arms as well.  She states that she believes it started out as nervous.  She has been treated with Medrol Dosepak, steroid injections, Pepcid 20 mg twice daily, and Atarax 10 mg 3 times daily.  She states she is not getting any relief.  She was also started on Lexapro.    She also has the atarax 10 mg TID but that is for itching. She states it does make her drowsy so I do not want to increase that at this time.   Denies any pain.  She denies any new fabric softeners, soaps lotions etc.  Plan: Courage patient to start taking Zyrtec daily.  Continue Pepcid 20 mg twice daily, Atarax 10 mg 3 times daily.  She is also to restart the Lexapro.  Referral to dermatology.  I will give her some triamcinolone cream to see if that helps in the meantime.        3. Anxiety  Assessment & Plan:  Pt states that her nerves are really bad. She lives at home and her daughter lives with her. She is overwhelmed. She does most of the housekeeping.   Dr. Ly tried to start her on lexapro 5 mg daily. Pt states it did not help. She was only on it for a week.  I told her that those medications take time, approx 4 weeks to really start noticing an affect.  Patient is to start taking lexapro 5 mg every day. This medication can be increased if needed.  Report any worsening symptoms.         Other orders  -     triamcinolone (KENALOG) 0.5 % cream; Apply 1 application topically to the appropriate area as directed 2 (Two) Times a Day.  Dispense: 454 g; Refill: 0      Follow Up   No follow-ups on  file.  Patient was given instructions and counseling regarding her condition or for health maintenance advice. Please see specific information pulled into the AVS if appropriate.

## 2022-04-07 NOTE — ASSESSMENT & PLAN NOTE
Pt states that her nerves are really bad. She lives at home and her daughter lives with her. She is overwhelmed. She does most of the housekeeping.   Dr. Ly tried to start her on lexapro 5 mg daily. Pt states it did not help. She was only on it for a week.  I told her that those medications take time, approx 4 weeks to really start noticing an affect.  Patient is to start taking lexapro 5 mg every day. This medication can be increased if needed.  Report any worsening symptoms.

## 2022-04-07 NOTE — ASSESSMENT & PLAN NOTE
Patient complains of itching and a rash.  The itching has been present for 5 weeks.  She states that she is miserable.  She states that she broke out in a rash about 2 to 3 days ago and it started on her trunk but is now on her arms as well.  She states that she believes it started out as nervous.  She has been treated with Medrol Dosepak, steroid injections, Pepcid 20 mg twice daily, and Atarax 10 mg 3 times daily.  She states she is not getting any relief.  She was also started on Lexapro.    She also has the atarax 10 mg TID but that is for itching. She states it does make her drowsy so I do not want to increase that at this time.   Denies any pain.  She denies any new fabric softeners, soaps lotions etc.  Plan: Courage patient to start taking Zyrtec daily.  Continue Pepcid 20 mg twice daily, Atarax 10 mg 3 times daily.  She is also to restart the Lexapro.  Referral to dermatology.  I will give her some triamcinolone cream to see if that helps in the meantime.

## 2022-04-25 NOTE — TELEPHONE ENCOUNTER
Caller: Harrison Dickbrendon SHEPPARD    Relationship: Self    Best call back number: [phone]     Requested Prescriptions:   Requested Prescriptions     Pending Prescriptions Disp Refills   • doxazosin (CARDURA) 8 MG tablet       Sig: Take 1.5 tablets by mouth Every Night.   • levothyroxine (SYNTHROID, LEVOTHROID) 50 MCG tablet       Sig: Take 1 tablet by mouth Every Morning.   • apixaban (ELIQUIS) 5 MG tablet tablet 90 tablet 1     Sig: Take 1 tablet by mouth Daily.   • hydrALAZINE (APRESOLINE) 25 MG tablet 270 tablet 2     Sig: Take 1 tablet by mouth 3 (Three) Times a Day.   • losartan-hydrochlorothiazide (Hyzaar) 100-25 MG per tablet 90 tablet 1     Sig: Take 1 tablet by mouth Daily.   • glyBURIDE-metFORMIN (GLUCOVANCE) 2.5-500 MG per tablet 180 tablet 1     Sig: Take 1 tablet by mouth 2 (Two) Times a Day With Meals.   dilTIAZem (TIAZAC) 180 MG 24 hr capsule   SITAGLIPTOR - 50 MG - 1 DAILY    Pharmacy where request [facility] be sent[facility], [address] - [phone] [facility] [phone]      Additional details provided by patient: PATIENT STATES PLEASE CALL HER AND ADVISE WHEN PRESCRIPTION IS SENT IN.    Does the patient have less than a 3 day supply:  [] Yes  [] No    Shelbi KAUR Rep   04/25/22 14:25 EDT         DELETE AFTER READING TO PATIENT: “Thank you for sharing this information with me. I will send a message to the clinical team. Please allow 48 hours for the clinical staff to follow up on this request.”

## 2022-04-26 RX ORDER — DOXAZOSIN 8 MG/1
8 TABLET ORAL NIGHTLY
Qty: 90 TABLET | Refills: 3 | Status: SHIPPED | OUTPATIENT
Start: 2022-04-26 | End: 2023-04-04 | Stop reason: SDUPTHER

## 2022-04-26 RX ORDER — DILTIAZEM HYDROCHLORIDE 180 MG/1
180 CAPSULE, EXTENDED RELEASE ORAL DAILY
Qty: 90 CAPSULE | Refills: 3 | Status: SHIPPED | OUTPATIENT
Start: 2022-04-26 | End: 2023-04-04 | Stop reason: SDUPTHER

## 2022-04-26 RX ORDER — LOSARTAN POTASSIUM AND HYDROCHLOROTHIAZIDE 25; 100 MG/1; MG/1
1 TABLET ORAL DAILY
Qty: 90 TABLET | Refills: 1 | Status: SHIPPED | OUTPATIENT
Start: 2022-04-26 | End: 2022-10-17 | Stop reason: SDUPTHER

## 2022-04-26 RX ORDER — GLYBURIDE-METFORMIN HYDROCHLORIDE 2.5; 5 MG/1; MG/1
1 TABLET ORAL 2 TIMES DAILY WITH MEALS
Qty: 180 TABLET | Refills: 1 | Status: SHIPPED | OUTPATIENT
Start: 2022-04-26 | End: 2022-10-17 | Stop reason: SDUPTHER

## 2022-04-26 RX ORDER — HYDRALAZINE HYDROCHLORIDE 25 MG/1
25 TABLET, FILM COATED ORAL 3 TIMES DAILY
Qty: 270 TABLET | Refills: 2 | Status: SHIPPED | OUTPATIENT
Start: 2022-04-26 | End: 2022-10-17 | Stop reason: SDUPTHER

## 2022-04-26 RX ORDER — LEVOTHYROXINE SODIUM 0.05 MG/1
50 TABLET ORAL
Qty: 90 TABLET | Refills: 3 | Status: SHIPPED | OUTPATIENT
Start: 2022-04-26 | End: 2023-01-04 | Stop reason: SDUPTHER

## 2022-04-27 ENCOUNTER — TELEPHONE (OUTPATIENT)
Dept: INTERNAL MEDICINE | Facility: CLINIC | Age: 82
End: 2022-04-27

## 2022-04-27 RX ORDER — SITAGLIPTIN 50 MG/1
50 TABLET, FILM COATED ORAL DAILY
Qty: 90 TABLET | Refills: 3 | Status: SHIPPED | OUTPATIENT
Start: 2022-04-27 | End: 2023-02-09 | Stop reason: SDUPTHER

## 2022-04-27 NOTE — TELEPHONE ENCOUNTER
Patient's daughter called and left me a voicemail stating that there were problems with patient's medications that were sent in yesterday and she was totally out of medication.  I tried to call her back at the number she left and I called both numbers listed for the patient and had no answer at any of them.  Left a VM on daughters number.  Will try again tomorrow.

## 2022-05-12 RX ORDER — ALLOPURINOL 300 MG/1
TABLET ORAL
Qty: 30 TABLET | Refills: 3 | Status: SHIPPED | OUTPATIENT
Start: 2022-05-12 | End: 2022-12-01 | Stop reason: SDUPTHER

## 2022-05-19 ENCOUNTER — OFFICE VISIT (OUTPATIENT)
Dept: INTERNAL MEDICINE | Facility: CLINIC | Age: 82
End: 2022-05-19

## 2022-05-19 VITALS
WEIGHT: 225 LBS | HEART RATE: 96 BPM | HEIGHT: 66 IN | TEMPERATURE: 97.5 F | OXYGEN SATURATION: 98 % | SYSTOLIC BLOOD PRESSURE: 117 MMHG | BODY MASS INDEX: 36.16 KG/M2 | RESPIRATION RATE: 22 BRPM | DIASTOLIC BLOOD PRESSURE: 70 MMHG

## 2022-05-19 DIAGNOSIS — H72.92 PERFORATION OF LEFT TYMPANIC MEMBRANE: ICD-10-CM

## 2022-05-19 DIAGNOSIS — R05.3 PERSISTENT DRY COUGH: Primary | ICD-10-CM

## 2022-05-19 PROCEDURE — 99213 OFFICE O/P EST LOW 20 MIN: CPT

## 2022-05-19 RX ORDER — BENZONATATE 100 MG/1
100 CAPSULE ORAL 3 TIMES DAILY PRN
Qty: 30 CAPSULE | Refills: 0 | Status: SHIPPED | OUTPATIENT
Start: 2022-05-19 | End: 2022-07-12

## 2022-05-19 RX ORDER — FLUTICASONE PROPIONATE 50 MCG
2 SPRAY, SUSPENSION (ML) NASAL DAILY
Qty: 16 G | Refills: 1 | Status: SHIPPED | OUTPATIENT
Start: 2022-05-19 | End: 2023-04-04

## 2022-05-19 NOTE — ASSESSMENT & PLAN NOTE
Patient denies feeling ill.  Advised patient to monitor and ensure that symptoms do not worsen or cough does not become productive.  Patient denies any shortness of breath or chest pain or chest heaviness.  Discussed most likely related to postnasal drip and advised Flonase.  Patient to follow-up if symptoms do not improve or if they worsen.  Patient advised if they worsen or shortness of breath/chest pain develop she needs to go the emergency department for further evaluation.  Patient verbalized understanding.

## 2022-05-19 NOTE — ASSESSMENT & PLAN NOTE
Patient has a perforation of left tympanic membrane.  Unsure of how long its been present.  Patient denies pain.  No erythema.  No active drainage.  Discussed ENT referral or weight/watch.  Patient elected to wait and monitor.  No signs and symptoms of acute infection at this time.

## 2022-05-19 NOTE — PROGRESS NOTES
"Chief Complaint  Ear Drainage (Pt has had clear drainage in her left ear for about a week.) and Cough (Patient has had a cough for 2 weeks and has not been able to cough up anything. Patient denies any SOB, or pain chest.)    Subjective          Luzma Dick presents to [facility]  History of Present Illness    Luzma is here for clear drainage out of her left ear for about a week. She also reports a cough for two weeks however not able to cough anything up.  She reports the cough is kind of dry.  She denies shortness of breath or chest pain.  She reports trying Robitussin but says it has not really helped much.  She reports that she does not necessarily feel bad.  She is having slight decreased hearing in her left ear.    Objective   Vital Signs:   /70 (BP Location: Left arm, Patient Position: Sitting, Cuff Size: Adult)   Pulse 96   Temp 97.5 °F (36.4 °C) (Temporal)   Resp 22   Ht 166.4 cm (65.51\")   Wt 102 kg (225 lb)   SpO2 98%   BMI 36.86 kg/m²     Physical Exam  Vitals reviewed.   Constitutional:       Appearance: Normal appearance. She is well-developed.   HENT:      Head: Normocephalic and atraumatic.      Right Ear: Tympanic membrane, ear canal and external ear normal.      Left Ear: Ear canal and external ear normal. Decreased hearing noted. Tympanic membrane is perforated.      Ears:      Comments: Patient reports drainage from ear. No drainage noted in ear canal. No erythema      Mouth/Throat:      Pharynx: No oropharyngeal exudate.   Eyes:      Conjunctiva/sclera: Conjunctivae normal.      Pupils: Pupils are equal, round, and reactive to light.   Cardiovascular:      Rate and Rhythm: Normal rate and regular rhythm.      Heart sounds: No murmur heard.    No friction rub. No gallop.   Pulmonary:      Effort: Pulmonary effort is normal.      Breath sounds: Normal breath sounds. No wheezing or rhonchi.   Skin:     General: Skin is warm and dry. "   Neurological:      Mental Status: She is alert and oriented to person, place, and time.   Psychiatric:         Mood and Affect: Affect normal.        Result Review :          Procedures      Assessment and Plan    Diagnoses and all orders for this visit:    1. Persistent dry cough (Primary)  Comments:  Recommended daily use of Flonase.  We will send in Tessalon Perles per patient request.  Lung sounds normal. O2 98%  Assessment & Plan:  Patient denies feeling ill.  Advised patient to monitor and ensure that symptoms do not worsen or cough does not become productive.  Patient denies any shortness of breath or chest pain or chest heaviness.  Discussed most likely related to postnasal drip and advised Flonase.  Patient to follow-up if symptoms do not improve or if they worsen.  Patient advised if they worsen or shortness of breath/chest pain develop she needs to go the emergency department for further evaluation.  Patient verbalized understanding.    Orders:  -     benzonatate (Tessalon Perles) 100 MG capsule; Take 1 capsule by mouth 3 (Three) Times a Day As Needed for Cough.  Dispense: 30 capsule; Refill: 0  -     fluticasone (Flonase) 50 MCG/ACT nasal spray; 2 sprays into the nostril(s) as directed by provider Daily.  Dispense: 16 g; Refill: 1    2. Perforation of left tympanic membrane  Assessment & Plan:  Patient has a perforation of left tympanic membrane.  Unsure of how long its been present.  Patient denies pain.  No erythema.  No active drainage.  Discussed ENT referral or weight/watch.  Patient elected to wait and monitor.  No signs and symptoms of acute infection at this time.      Patient was instructed and educated on their diagnoses and treatment plan prior to leaving the office. If symptoms worsen or persist, seek emergency medical attention. Take all medications as prescribed. Call the office if any questions or concerns arise.       Follow Up   Return if symptoms worsen or fail to improve.  Patient  was given instructions and counseling regarding her condition or for health maintenance advice. Please see specific information pulled into the AVS if appropriate.

## 2022-05-20 ENCOUNTER — HOSPITAL ENCOUNTER (OUTPATIENT)
Dept: MAMMOGRAPHY | Facility: HOSPITAL | Age: 82
Discharge: HOME | End: 2022-05-20
Admitting: INTERNAL MEDICINE

## 2022-05-20 DIAGNOSIS — I10 HYPERTENSION, ESSENTIAL: ICD-10-CM

## 2022-05-20 DIAGNOSIS — I48.11 LONGSTANDING PERSISTENT ATRIAL FIBRILLATION: ICD-10-CM

## 2022-05-20 DIAGNOSIS — F51.01 PRIMARY INSOMNIA: ICD-10-CM

## 2022-05-20 DIAGNOSIS — R74.8 ELEVATED LIVER ENZYMES: ICD-10-CM

## 2022-05-20 DIAGNOSIS — E06.3 HYPOTHYROIDISM DUE TO HASHIMOTO'S THYROIDITIS: ICD-10-CM

## 2022-05-20 DIAGNOSIS — E66.01 MORBID (SEVERE) OBESITY DUE TO EXCESS CALORIES: ICD-10-CM

## 2022-05-20 DIAGNOSIS — E13.9 DIABETES 1.5, MANAGED AS TYPE 2: ICD-10-CM

## 2022-05-20 DIAGNOSIS — E55.9 VITAMIN D DEFICIENCY: ICD-10-CM

## 2022-05-20 DIAGNOSIS — Z12.31 SCREENING MAMMOGRAM FOR BREAST CANCER: ICD-10-CM

## 2022-05-20 DIAGNOSIS — E03.8 HYPOTHYROIDISM DUE TO HASHIMOTO'S THYROIDITIS: ICD-10-CM

## 2022-05-20 DIAGNOSIS — N18.31 STAGE 3A CHRONIC KIDNEY DISEASE: ICD-10-CM

## 2022-05-20 DIAGNOSIS — D50.9 IRON DEFICIENCY ANEMIA, UNSPECIFIED IRON DEFICIENCY ANEMIA TYPE: ICD-10-CM

## 2022-05-20 PROCEDURE — 77063 BREAST TOMOSYNTHESIS BI: CPT

## 2022-05-20 PROCEDURE — 77067 SCR MAMMO BI INCL CAD: CPT

## 2022-06-21 ENCOUNTER — TELEPHONE (OUTPATIENT)
Dept: INTERNAL MEDICINE | Facility: CLINIC | Age: 82
End: 2022-06-21

## 2022-06-21 RX ORDER — CARVEDILOL 12.5 MG/1
12.5 TABLET ORAL 2 TIMES DAILY WITH MEALS
Qty: 180 TABLET | Refills: 1 | Status: SHIPPED | OUTPATIENT
Start: 2022-06-21 | End: 2023-01-04 | Stop reason: SDUPTHER

## 2022-06-21 NOTE — TELEPHONE ENCOUNTER
Caller: Luzma Dick    Relationship: Self    Best call back number: [phone]    Requested Prescriptions:   Requested Prescriptions     Pending Prescriptions Disp Refills   • carvedilol (COREG) 12.5 MG tablet 180 tablet 1     Sig: Take 1 tablet by mouth 2 (Two) Times a Day With Meals.        Pharm[facility] where request should be sent[facility]  [place] [place] - [address] - [phone]  - [phone] FX     Does the patient have less than a 3 day supply:  [x] Yes  [] No    Shelbi Conteh Rep   06/21/22 10:20 EDT

## 2022-07-04 ENCOUNTER — APPOINTMENT (OUTPATIENT)
Dept: GENERAL RADIOLOGY | Facility: HOSPITAL | Age: 82
End: 2022-07-04

## 2022-07-04 ENCOUNTER — HOSPITAL ENCOUNTER (EMERGENCY)
Facility: HOSPITAL | Age: 82
Discharge: HOME | End: 2022-07-04
Attending: STUDENT IN AN ORGANIZED HEALTH CARE EDUCATION/TRAINING PROGRAM | Admitting: STUDENT IN AN ORGANIZED HEALTH CARE EDUCATION/TRAINING PROGRAM

## 2022-07-04 VITALS
WEIGHT: 228.4 LBS | SYSTOLIC BLOOD PRESSURE: 118 MMHG | DIASTOLIC BLOOD PRESSURE: 63 MMHG | HEIGHT: 66 IN | TEMPERATURE: 98.2 F | BODY MASS INDEX: 36.71 KG/M2 | OXYGEN SATURATION: 95 % | HEART RATE: 97 BPM | RESPIRATION RATE: 20 BRPM

## 2022-07-04 DIAGNOSIS — D72.829 LEUKOCYTOSIS, UNSPECIFIED TYPE: ICD-10-CM

## 2022-07-04 DIAGNOSIS — N39.0 URINARY TRACT INFECTION IN FEMALE: ICD-10-CM

## 2022-07-04 DIAGNOSIS — R53.1 WEAKNESS: ICD-10-CM

## 2022-07-04 DIAGNOSIS — R63.0 ANOREXIA: ICD-10-CM

## 2022-07-04 DIAGNOSIS — R68.89 DECREASED ACTIVITY TOLERANCE: ICD-10-CM

## 2022-07-04 DIAGNOSIS — D64.9 CHRONIC ANEMIA: ICD-10-CM

## 2022-07-04 DIAGNOSIS — N18.9 CHRONIC RENAL IMPAIRMENT, UNSPECIFIED CKD STAGE: ICD-10-CM

## 2022-07-04 DIAGNOSIS — J18.9 PNEUMONIA OF LEFT LUNG DUE TO INFECTIOUS ORGANISM, UNSPECIFIED PART OF LUNG: Primary | ICD-10-CM

## 2022-07-04 LAB
ALBUMIN SERPL-MCNC: 2.8 G/DL (ref 3.5–5.2)
ALBUMIN/GLOB SERPL: 0.7 G/DL
ALP SERPL-CCNC: 129 U/L (ref 39–117)
ALT SERPL W P-5'-P-CCNC: 20 U/L (ref 1–33)
ANION GAP SERPL CALCULATED.3IONS-SCNC: 13.6 MMOL/L (ref 5–15)
AST SERPL-CCNC: 15 U/L (ref 1–32)
BACTERIA UR QL AUTO: ABNORMAL /HPF
BASOPHILS # BLD AUTO: 0.03 10*3/MM3 (ref 0–0.2)
BASOPHILS NFR BLD AUTO: 0.2 % (ref 0–1.5)
BILIRUB SERPL-MCNC: 0.3 MG/DL (ref 0–1.2)
BILIRUB UR QL STRIP: NEGATIVE
BUN SERPL-MCNC: 40 MG/DL (ref 8–23)
BUN/CREAT SERPL: 21.9 (ref 7–25)
CALCIUM SPEC-SCNC: 10.3 MG/DL (ref 8.6–10.5)
CHLORIDE SERPL-SCNC: 105 MMOL/L (ref 98–107)
CLARITY UR: ABNORMAL
CO2 SERPL-SCNC: 19.4 MMOL/L (ref 22–29)
COLOR UR: ABNORMAL
CREAT SERPL-MCNC: 1.83 MG/DL (ref 0.57–1)
D-LACTATE SERPL-SCNC: 1.3 MMOL/L (ref 0.5–2)
DEPRECATED RDW RBC AUTO: 45 FL (ref 37–54)
EGFRCR SERPLBLD CKD-EPI 2021: 27.3 ML/MIN/1.73
EOSINOPHIL # BLD AUTO: 0.17 10*3/MM3 (ref 0–0.4)
EOSINOPHIL NFR BLD AUTO: 1.2 % (ref 0.3–6.2)
ERYTHROCYTE [DISTWIDTH] IN BLOOD BY AUTOMATED COUNT: 14.7 % (ref 12.3–15.4)
GLOBULIN UR ELPH-MCNC: 3.8 GM/DL
GLUCOSE SERPL-MCNC: 244 MG/DL (ref 65–99)
GLUCOSE UR STRIP-MCNC: NEGATIVE MG/DL
HCT VFR BLD AUTO: 29.1 % (ref 34–46.6)
HGB BLD-MCNC: 9.6 G/DL (ref 12–15.9)
HGB UR QL STRIP.AUTO: NEGATIVE
HOLD SPECIMEN: NORMAL
HOLD SPECIMEN: NORMAL
HYALINE CASTS UR QL AUTO: ABNORMAL /LPF
IMM GRANULOCYTES # BLD AUTO: 0.22 10*3/MM3 (ref 0–0.05)
IMM GRANULOCYTES NFR BLD AUTO: 1.6 % (ref 0–0.5)
KETONES UR QL STRIP: ABNORMAL
LEUKOCYTE ESTERASE UR QL STRIP.AUTO: ABNORMAL
LYMPHOCYTES # BLD AUTO: 2.07 10*3/MM3 (ref 0.7–3.1)
LYMPHOCYTES NFR BLD AUTO: 15.2 % (ref 19.6–45.3)
MAGNESIUM SERPL-MCNC: 1.5 MG/DL (ref 1.6–2.4)
MCH RBC QN AUTO: 27.9 PG (ref 26.6–33)
MCHC RBC AUTO-ENTMCNC: 33 G/DL (ref 31.5–35.7)
MCV RBC AUTO: 84.6 FL (ref 79–97)
MONOCYTES # BLD AUTO: 0.87 10*3/MM3 (ref 0.1–0.9)
MONOCYTES NFR BLD AUTO: 6.4 % (ref 5–12)
NEUTROPHILS NFR BLD AUTO: 10.26 10*3/MM3 (ref 1.7–7)
NEUTROPHILS NFR BLD AUTO: 75.4 % (ref 42.7–76)
NITRITE UR QL STRIP: NEGATIVE
NRBC BLD AUTO-RTO: 0 /100 WBC (ref 0–0.2)
PH UR STRIP.AUTO: 5.5 [PH] (ref 5–8)
PLATELET # BLD AUTO: 350 10*3/MM3 (ref 140–450)
PMV BLD AUTO: 9.4 FL (ref 6–12)
POTASSIUM SERPL-SCNC: 4.7 MMOL/L (ref 3.5–5.2)
PROT SERPL-MCNC: 6.6 G/DL (ref 6–8.5)
PROT UR QL STRIP: ABNORMAL
QT INTERVAL: 350 MS
RBC # BLD AUTO: 3.44 10*6/MM3 (ref 3.77–5.28)
RBC # UR STRIP: ABNORMAL /HPF
REF LAB TEST METHOD: ABNORMAL
SODIUM SERPL-SCNC: 138 MMOL/L (ref 136–145)
SP GR UR STRIP: 1.02 (ref 1–1.03)
SQUAMOUS #/AREA URNS HPF: ABNORMAL /HPF
TROPONIN T SERPL-MCNC: <0.01 NG/ML (ref 0–0.03)
UROBILINOGEN UR QL STRIP: ABNORMAL
WBC # UR STRIP: ABNORMAL /HPF
WBC NRBC COR # BLD: 13.62 10*3/MM3 (ref 3.4–10.8)
WHOLE BLOOD HOLD COAG: NORMAL
WHOLE BLOOD HOLD SPECIMEN: NORMAL

## 2022-07-04 PROCEDURE — 94760 N-INVAS EAR/PLS OXIMETRY 1: CPT

## 2022-07-04 PROCEDURE — 83605 ASSAY OF LACTIC ACID: CPT | Performed by: NURSE PRACTITIONER

## 2022-07-04 PROCEDURE — 93005 ELECTROCARDIOGRAM TRACING: CPT

## 2022-07-04 PROCEDURE — 94799 UNLISTED PULMONARY SVC/PX: CPT

## 2022-07-04 PROCEDURE — 81001 URINALYSIS AUTO W/SCOPE: CPT

## 2022-07-04 PROCEDURE — 80053 COMPREHEN METABOLIC PANEL: CPT

## 2022-07-04 PROCEDURE — 94640 AIRWAY INHALATION TREATMENT: CPT

## 2022-07-04 PROCEDURE — 71045 X-RAY EXAM CHEST 1 VIEW: CPT

## 2022-07-04 PROCEDURE — 93005 ELECTROCARDIOGRAM TRACING: CPT | Performed by: STUDENT IN AN ORGANIZED HEALTH CARE EDUCATION/TRAINING PROGRAM

## 2022-07-04 PROCEDURE — 87040 BLOOD CULTURE FOR BACTERIA: CPT | Performed by: NURSE PRACTITIONER

## 2022-07-04 PROCEDURE — 85025 COMPLETE CBC W/AUTO DIFF WBC: CPT

## 2022-07-04 PROCEDURE — 93010 ELECTROCARDIOGRAM REPORT: CPT | Performed by: INTERNAL MEDICINE

## 2022-07-04 PROCEDURE — 96365 THER/PROPH/DIAG IV INF INIT: CPT

## 2022-07-04 PROCEDURE — 83735 ASSAY OF MAGNESIUM: CPT

## 2022-07-04 PROCEDURE — 94664 DEMO&/EVAL PT USE INHALER: CPT

## 2022-07-04 PROCEDURE — 36415 COLL VENOUS BLD VENIPUNCTURE: CPT

## 2022-07-04 PROCEDURE — 25010000002 CEFTRIAXONE PER 250 MG: Performed by: NURSE PRACTITIONER

## 2022-07-04 PROCEDURE — 84484 ASSAY OF TROPONIN QUANT: CPT

## 2022-07-04 PROCEDURE — 99284 EMERGENCY DEPT VISIT MOD MDM: CPT

## 2022-07-04 RX ORDER — CEPHALEXIN 500 MG/1
500 CAPSULE ORAL 3 TIMES DAILY
Qty: 21 CAPSULE | Refills: 0 | Status: SHIPPED | OUTPATIENT
Start: 2022-07-04 | End: 2022-07-12

## 2022-07-04 RX ORDER — SODIUM CHLORIDE 0.9 % (FLUSH) 0.9 %
10 SYRINGE (ML) INJECTION AS NEEDED
Status: DISCONTINUED | OUTPATIENT
Start: 2022-07-04 | End: 2022-07-05 | Stop reason: HOSPADM

## 2022-07-04 RX ORDER — IPRATROPIUM BROMIDE AND ALBUTEROL SULFATE 2.5; .5 MG/3ML; MG/3ML
3 SOLUTION RESPIRATORY (INHALATION) ONCE
Status: COMPLETED | OUTPATIENT
Start: 2022-07-04 | End: 2022-07-04

## 2022-07-04 RX ORDER — CEFTRIAXONE SODIUM 1 G/50ML
1 INJECTION, SOLUTION INTRAVENOUS ONCE
Status: COMPLETED | OUTPATIENT
Start: 2022-07-04 | End: 2022-07-04

## 2022-07-04 RX ORDER — BROMPHENIRAMINE MALEATE, PSEUDOEPHEDRINE HYDROCHLORIDE, AND DEXTROMETHORPHAN HYDROBROMIDE 2; 30; 10 MG/5ML; MG/5ML; MG/5ML
2.5 SYRUP ORAL 4 TIMES DAILY PRN
Qty: 118 ML | Refills: 0 | Status: SHIPPED | OUTPATIENT
Start: 2022-07-04 | End: 2023-04-04

## 2022-07-04 RX ORDER — BROMPHENIRAMINE MALEATE, PSEUDOEPHEDRINE HYDROCHLORIDE, AND DEXTROMETHORPHAN HYDROBROMIDE 2; 30; 10 MG/5ML; MG/5ML; MG/5ML
5 SYRUP ORAL ONCE
Status: COMPLETED | OUTPATIENT
Start: 2022-07-04 | End: 2022-07-04

## 2022-07-04 RX ORDER — AZITHROMYCIN 250 MG/1
TABLET, FILM COATED ORAL
Qty: 6 TABLET | Refills: 0 | Status: SHIPPED | OUTPATIENT
Start: 2022-07-04 | End: 2022-07-09

## 2022-07-04 RX ADMIN — SODIUM CHLORIDE 1000 ML: 9 INJECTION, SOLUTION INTRAVENOUS at 18:47

## 2022-07-04 RX ADMIN — BROMPHENIRAMINE MALEATE, PSEUDOEPHEDRINE HYDROCHLORIDE, AND DEXTROMETHORPHAN HYDROBROMIDE 5 ML: 2; 30; 10 SYRUP ORAL at 21:23

## 2022-07-04 RX ADMIN — CEFTRIAXONE SODIUM 1 G: 1 INJECTION, SOLUTION INTRAVENOUS at 20:52

## 2022-07-04 RX ADMIN — IPRATROPIUM BROMIDE AND ALBUTEROL SULFATE 3 ML: .5; 3 SOLUTION RESPIRATORY (INHALATION) at 21:28

## 2022-07-04 NOTE — ED PROVIDER NOTES
Time: 6:13 PM EDT  Arrived by: private car  Chief Complaint: Generalized weakness  History provided by: Patient and her daughter  History is limited by: N/A     History of Present Illness:  Patient is a 82 y.o. year old female who presents to the emergency department with complaint of generalized weakness that has gotten worse over the past week.  Her daughter reports that she has had a decreased appetite and has not been eating or drinking as much as usual.  She states that she has been forgetful and mildly confused.  She also states that she is a lot weaker than usual.  She states that she had a cough 2 weeks ago that improved some but that has returned now and is persistent and productive.        Patient Care Team  Primary Care Provider: Shay Ly MD    Past Medical History:     No Known Allergies  Past Medical History:   Diagnosis Date   • A-fib (HCC)    • Acute gout    • Anxiety    • Bilateral shoulder pain    • Depression    • Essential (primary) hypertension    • High cholesterol    • Insomnia, unspecified    • Kidney mass    • Kidney stones    • Mixed hyperlipidemia    • Obesity    • Pulmonary nodule    • TIA (transient ischemic attack)    • Type 2 diabetes mellitus without complication (HCC)     SINCE AGE 60-SUGARS -140   • Urge incontinence    • Urinary incontinence    • Vitamin D deficiency, unspecified      Past Surgical History:   Procedure Laterality Date   • BREAST BIOPSY Left 10/2014   • BREAST LUMPECTOMY  2014   • KNEE ARTHROPLASTY Right 2008   • OTHER SURGICAL HISTORY Right     EAR SURGERY   • TOTAL KNEE ARTHROPLASTY Left 03/2016   • URETERAL STENT INSERTION      Stent placement for kidney stones     Family History   Problem Relation Age of Onset   • Breast cancer Sister 59       Home Medications:  Prior to Admission medications    Medication Sig Start Date End Date Taking? Authorizing Provider   allopurinol (ZYLOPRIM) 300 MG tablet TAKE 1 TABLET BY MOUTH EVERY DAY 5/12/22    Shay Ly MD   apixaban (ELIQUIS) 5 MG tablet tablet Take 1 tablet by mouth 2 (Two) Times a Day. 4/27/22   Shay Ly MD   benzonatate (Tessalon Perles) 100 MG capsule Take 1 capsule by mouth 3 (Three) Times a Day As Needed for Cough. 5/19/22   Alma Paul APRN   carvedilol (COREG) 12.5 MG tablet Take 1 tablet by mouth 2 (Two) Times a Day With Meals. 6/21/22   Shay Ly MD   Cholecalciferol (VITAMIN D) 2000 UNITS capsule Take 1,000 Units by mouth.    Provider, MD Alexa   dilTIAZem (TIAZAC) 180 MG 24 hr capsule Take 1 capsule by mouth Daily. 4/26/22   Shay Ly MD   doxazosin (CARDURA) 8 MG tablet Take 1 tablet by mouth Every Night. 4/26/22   Shay Ly MD   escitalopram (Lexapro) 5 MG tablet Take 1 tablet by mouth Daily. 3/14/22   Shay Ly MD   famotidine (Pepcid) 20 MG tablet Take 1 tablet by mouth 2 (Two) Times a Day. 3/14/22   Shay Ly MD   fluticasone (Flonase) 50 MCG/ACT nasal spray 2 sprays into the nostril(s) as directed by provider Daily. 5/19/22   Alma Paul APRN   glucose blood test strip 1 each by Other route As Needed (as needed). Use as instructed 3/7/22   Shay Ly MD   glyBURIDE-metFORMIN (GLUCOVANCE) 2.5-500 MG per tablet Take 1 tablet by mouth 2 (Two) Times a Day With Meals. 4/26/22   Shay Ly MD   hydrALAZINE (APRESOLINE) 25 MG tablet Take 1 tablet by mouth 3 (Three) Times a Day. 4/26/22   Shay Ly MD   hydrOXYzine (ATARAX) 10 MG tablet Take 1 tablet by mouth 3 (Three) Times a Day As Needed for Itching. 3/14/22   Shay Ly MD   insulin glargine (Lantus) 100 UNIT/ML injection Inject 60 Units under the skin into the appropriate area as directed Every Night. 3/7/22   Shay Ly MD   Januvia 50 MG tablet Take 1 tablet by mouth Daily. 4/27/22   Shay Ly MD   levothyroxine (SYNTHROID, LEVOTHROID) 50 MCG  "tablet Take 1 tablet by mouth Every Morning. 4/26/22   Shay Ly MD   losartan-hydrochlorothiazide (Hyzaar) 100-25 MG per tablet Take 1 tablet by mouth Daily. 4/26/22   Shay Ly MD   triamcinolone (KENALOG) 0.5 % cream Apply 1 application topically to the appropriate area as directed 2 (Two) Times a Day. 4/7/22   Winsome Garcia APRN   zolpidem (AMBIEN) 5 MG tablet Take 1 tablet by mouth At Night As Needed for Sleep. 1/4/22   Shay Ly MD        Social History:   Social History     Tobacco Use   • Smoking status: Never Smoker   • Smokeless tobacco: Never Used   Vaping Use   • Vaping Use: Never used   Substance Use Topics   • Alcohol use: No   • Drug use: No     Recent travel: no     Review of Systems:  Review of Systems   Constitutional: Positive for activity change and appetite change. Negative for chills and fever.   HENT: Negative for congestion, ear pain, rhinorrhea and sore throat.    Eyes: Negative for pain.   Respiratory: Positive for cough and shortness of breath.    Cardiovascular: Negative for chest pain.   Gastrointestinal: Negative for abdominal pain, diarrhea, nausea and vomiting.   Genitourinary: Negative for decreased urine volume, dysuria and flank pain.   Musculoskeletal: Negative for arthralgias and myalgias.   Skin: Negative for rash.   Neurological: Positive for weakness. Negative for seizures and headaches.   All other systems reviewed and are negative.       Physical Exam:  /63 (BP Location: Right arm, Patient Position: Lying)   Pulse 97   Temp 98.2 °F (36.8 °C) (Oral)   Resp 20   Ht 167.6 cm (66\")   Wt 104 kg (228 lb 6.3 oz)   SpO2 95%   BMI 36.86 kg/m²     Physical Exam  Vitals and nursing note reviewed.   Constitutional:       General: She is not in acute distress.     Appearance: Normal appearance. She is normal weight. She is not ill-appearing, toxic-appearing or diaphoretic.   HENT:      Head: Normocephalic and atraumatic.      Right " Ear: External ear normal.      Left Ear: External ear normal.   Eyes:      General: No scleral icterus.     Conjunctiva/sclera: Conjunctivae normal.      Pupils: Pupils are equal, round, and reactive to light.   Cardiovascular:      Rate and Rhythm: Normal rate and regular rhythm.      Heart sounds: Normal heart sounds.   Pulmonary:      Effort: Pulmonary effort is normal. No respiratory distress.      Breath sounds: Decreased air movement present. Decreased breath sounds present.   Abdominal:      General: Bowel sounds are normal. There is no distension.      Palpations: Abdomen is soft.      Tenderness: There is no abdominal tenderness.   Musculoskeletal:         General: No swelling, tenderness, deformity or signs of injury. Normal range of motion.      Cervical back: Normal range of motion and neck supple.   Skin:     General: Skin is warm and dry.      Capillary Refill: Capillary refill takes less than 2 seconds.   Neurological:      General: No focal deficit present.      Mental Status: She is alert and oriented to person, place, and time.   Psychiatric:         Mood and Affect: Mood normal.         Behavior: Behavior normal.                Medications in the Emergency Department:  Medications   sodium chloride 0.9 % bolus 1,000 mL (0 mL Intravenous Stopped 7/4/22 2232)   cefTRIAXone (ROCEPHIN) IVPB 1 g (0 g Intravenous Stopped 7/4/22 2123)   brompheniramine-pseudoephedrine-DM syrup 5 mL (5 mL Oral Given 7/4/22 2123)   ipratropium-albuterol (DUO-NEB) nebulizer solution 3 mL (3 mL Nebulization Given 7/4/22 2128)        Labs  Lab Results (last 24 hours)     Procedure Component Value Units Date/Time    CBC & Differential [[id number]]  (Abnormal) Collected: 07/04/22 1407    Specimen: Blood Updated: 07/04/22 1425    Narrative:      The following orders were created for panel order CBC & Differential.  Procedure                               Abnormality         Status                     ---------                                -----------         ------                     CBC Auto Differential[[id number]]        Abnormal            Final result                 Please view results for these tests on the individual orders.    Comprehensive Metabolic Panel [[id number]]  (Abnormal) Collected: 07/04/22 1407    Specimen: Blood Updated: 07/04/22 1449     Glucose 244 mg/dL      BUN 40 mg/dL      Creatinine 1.83 mg/dL      Sodium 138 mmol/L      Potassium 4.7 mmol/L      Chloride 105 mmol/L      CO2 19.4 mmol/L      Calcium 10.3 mg/dL      Total Protein 6.6 g/dL      Albumin 2.80 g/dL      ALT (SGPT) 20 U/L      AST (SGOT) 15 U/L      Alkaline Phosphatase 129 U/L      Total Bilirubin 0.3 mg/dL      Globulin 3.8 gm/dL      A/G Ratio 0.7 g/dL      BUN/Creatinine Ratio 21.9     Anion Gap 13.6 mmol/L      eGFR 27.3 mL/min/1.73      Comment: National Kidney Foundation and American Society of Nephrology (ASN) Task Force recommended calculation based on the Chronic Kidney Disease Epidemiology Collaboration (CKD-EPI) equation refit without adjustment for race.       Narrative:      GFR Normal >60  Chronic Kidney Disease <60  Kidney Failure <15      Troponin [[id number]]  (Normal) Collected: 07/04/22 1407    Specimen: Blood Updated: 07/04/22 1449     Troponin T <0.010 ng/mL     Narrative:      Troponin T Reference Range:  <= 0.03 ng/mL-   Negative for AMI  >0.03 ng/mL-     Abnormal for myocardial necrosis.  Clinicians would have to utilize clinical acumen, EKG, Troponin and serial changes to determine if it is an Acute Myocardial Infarction or myocardial injury due to an underlying chronic condition.       Results may be falsely decreased if patient taking Biotin.      Magnesium [[id number]]  (Abnormal) Collected: 07/04/22 1407    Specimen: Blood Updated: 07/04/22 1449     Magnesium 1.5 mg/dL     CBC Auto Differential [[id number]]  (Abnormal) Collected: 07/04/22 1407    Specimen: Blood Updated: 07/04/22 1425     WBC 13.62 10*3/mm3      RBC 3.44  10*6/mm3      Hemoglobin 9.6 g/dL      Hematocrit 29.1 %      MCV 84.6 fL      MCH 27.9 pg      MCHC 33.0 g/dL      RDW 14.7 %      RDW-SD 45.0 fl      MPV 9.4 fL      Platelets 350 10*3/mm3      Neutrophil % 75.4 %      Lymphocyte % 15.2 %      Monocyte % 6.4 %      Eosinophil % 1.2 %      Basophil % 0.2 %      Immature Grans % 1.6 %      Neutrophils, Absolute 10.26 10*3/mm3      Lymphocytes, Absolute 2.07 10*3/mm3      Monocytes, Absolute 0.87 10*3/mm3      Eosinophils, Absolute 0.17 10*3/mm3      Basophils, Absolute 0.03 10*3/mm3      Immature Grans, Absolute 0.22 10*3/mm3      nRBC 0.0 /100 WBC     Urinalysis With Culture If Indicated - Urine, Clean Catch [[id number]]  (Abnormal) Collected: 07/04/22 1416    Specimen: Urine, Clean Catch Updated: 07/04/22 1458     Color, UA Dark Yellow     Appearance, UA Cloudy     pH, UA 5.5     Specific Gravity, UA 1.024     Glucose, UA Negative     Ketones, UA Trace     Bilirubin, UA Negative     Blood, UA Negative     Protein, UA 30 mg/dL (1+)     Leuk Esterase, UA Small (1+)     Nitrite, UA Negative     Urobilinogen, UA 1.0 E.U./dL    Narrative:      In absence of clinical symptoms, the presence of pyuria, bacteria, and/or nitrites on the urinalysis result does not correlate with infection.    Urinalysis, Microscopic Only - Urine, Clean Catch [[id number]]  (Abnormal) Collected: 07/04/22 1416    Specimen: Urine, Clean Catch Updated: 07/04/22 1458     RBC, UA None Seen /HPF      WBC, UA 3-5 /HPF      Comment: Urine culture not indicated.        Bacteria, UA Trace /HPF      Squamous Epithelial Cells, UA 3-6 /HPF      Hyaline Casts, UA None Seen /LPF      Methodology Automated Microscopy    Blood Culture - Blood, Arm, Left [[id number]] Collected: 07/04/22 1847    Specimen: Blood from Arm, Left Updated: 07/04/22 1852    Blood Culture - Blood, Arm, Left [[id number]] Collected: 07/04/22 1847    Specimen: Blood from Arm, Left Updated: 07/04/22 1852    Lactic Acid, Plasma [[id number]]   (Normal) Collected: 07/04/22 1847    Specimen: Blood Updated: 07/04/22 1907     Lactate 1.3 mmol/L            Imaging:  XR Chest 1 View    Result Date: 7/4/2022  PROCEDURE: XR CHEST 1 VW  COMPARISON: [facility], CR, CHEST PA/AP & LAT 2V, 10/27/2020, 15:17.  INDICATIONS: WEAKNESS, FATIGUE.  FINDINGS:  There is extensive patchy airspace opacity throughout the left lower lung.  Small left pleural effusion is also noted.  The right lung is grossly clear.  No pneumothorax.  The trachea is midline.  There are senescent changes in the thoracic aorta.  Cardiac, hilar, and mediastinal silhouettes are stable.       Extensive patchy airspace consolidation throughout the left mid and lower lung field and is probable small left pleural effusion.  For the appearance is compatible with pneumonia, underlying neoplastic disease is not excluded.  Close follow-up is recommended to ensure complete radiographic resolution.       AI HANNA DO       Electronically Signed and Approved By: AI HANNA DO on 7/04/2022 at 15:22               Procedures:  Procedures    Progress                            Medical Decision Making:  MDM  Number of Diagnoses or Management Options  Anorexia: new and requires workup  Chronic anemia: established and improving  Chronic renal impairment, unspecified CKD stage: established and worsening  Decreased activity tolerance: new and requires workup  Leukocytosis, unspecified type: new and requires workup  Pneumonia of left lung due to infectious organism, unspecified part of lung: new and requires workup  Urinary tract infection in female: new and requires workup  Weakness: new and requires workup     Amount and/or Complexity of Data Reviewed  Clinical lab tests: reviewed  Tests in the radiology section of CPT®: reviewed  Tests in the medicine section of CPT®: reviewed  Decide to obtain previous medical records or to obtain history from someone other than the patient: yes  Discuss the  patient with other providers: yes (Dr. Boogie Laird)    Risk of Complications, Morbidity, and/or Mortality  Presenting problems: moderate  Diagnostic procedures: moderate  Management options: moderate    Patient Progress  Patient progress: stable       Final diagnoses:   Pneumonia of left lung due to infectious organism, unspecified part of lung   Weakness   Decreased activity tolerance   Anorexia   Chronic anemia   Chronic renal impairment, unspecified CKD stage   Leukocytosis, unspecified type   Urinary tract infection in female        Disposition:  ED Disposition     ED Disposition   Discharge    Condition   Stable    Comment   --             This medical record created using voice recognition software.           Gertrude Feliz, CAROLIN  07/05/22 0211

## 2022-07-05 NOTE — DISCHARGE INSTRUCTIONS
As discussed, please return to the emergency department immediately if you develop any new or worsening symptoms including fever, increased shortness of breath, or chest pain.  All your primary care provider tomorrow to schedule a follow-up appointment.

## 2022-07-08 ENCOUNTER — APPOINTMENT (OUTPATIENT)
Dept: CARDIOLOGY | Facility: HOSPITAL | Age: 82
End: 2022-07-08

## 2022-07-08 DIAGNOSIS — F51.01 PRIMARY INSOMNIA: ICD-10-CM

## 2022-07-08 RX ORDER — ZOLPIDEM TARTRATE 5 MG/1
TABLET ORAL
Qty: 90 TABLET | Refills: 1 | Status: SHIPPED | OUTPATIENT
Start: 2022-07-08 | End: 2023-01-04 | Stop reason: SDUPTHER

## 2022-07-09 LAB
BACTERIA SPEC AEROBE CULT: NORMAL
BACTERIA SPEC AEROBE CULT: NORMAL

## 2022-07-11 ENCOUNTER — LAB (OUTPATIENT)
Dept: LAB | Facility: HOSPITAL | Age: 82
End: 2022-07-11

## 2022-07-11 DIAGNOSIS — R74.8 ELEVATED LIVER ENZYMES: ICD-10-CM

## 2022-07-11 DIAGNOSIS — Z12.31 SCREENING MAMMOGRAM FOR BREAST CANCER: ICD-10-CM

## 2022-07-11 DIAGNOSIS — N18.31 STAGE 3A CHRONIC KIDNEY DISEASE: ICD-10-CM

## 2022-07-11 DIAGNOSIS — I48.11 LONGSTANDING PERSISTENT ATRIAL FIBRILLATION: ICD-10-CM

## 2022-07-11 DIAGNOSIS — I10 HYPERTENSION, ESSENTIAL: ICD-10-CM

## 2022-07-11 DIAGNOSIS — F51.01 PRIMARY INSOMNIA: ICD-10-CM

## 2022-07-11 DIAGNOSIS — E06.3 HYPOTHYROIDISM DUE TO HASHIMOTO'S THYROIDITIS: ICD-10-CM

## 2022-07-11 DIAGNOSIS — E03.8 HYPOTHYROIDISM DUE TO HASHIMOTO'S THYROIDITIS: ICD-10-CM

## 2022-07-11 DIAGNOSIS — E66.01 MORBID (SEVERE) OBESITY DUE TO EXCESS CALORIES: ICD-10-CM

## 2022-07-11 DIAGNOSIS — E55.9 VITAMIN D DEFICIENCY: ICD-10-CM

## 2022-07-11 DIAGNOSIS — D50.9 IRON DEFICIENCY ANEMIA, UNSPECIFIED IRON DEFICIENCY ANEMIA TYPE: ICD-10-CM

## 2022-07-11 DIAGNOSIS — E13.9 DIABETES 1.5, MANAGED AS TYPE 2: ICD-10-CM

## 2022-07-11 LAB
ALBUMIN SERPL-MCNC: 3 G/DL (ref 3.5–5.2)
ALBUMIN/GLOB SERPL: 0.8 G/DL
ALP SERPL-CCNC: 102 U/L (ref 39–117)
ALT SERPL W P-5'-P-CCNC: 13 U/L (ref 1–33)
ANION GAP SERPL CALCULATED.3IONS-SCNC: 13.8 MMOL/L (ref 5–15)
AST SERPL-CCNC: 16 U/L (ref 1–32)
BASOPHILS # BLD AUTO: 0.05 10*3/MM3 (ref 0–0.2)
BASOPHILS NFR BLD AUTO: 0.4 % (ref 0–1.5)
BILIRUB SERPL-MCNC: 0.3 MG/DL (ref 0–1.2)
BUN SERPL-MCNC: 29 MG/DL (ref 8–23)
BUN/CREAT SERPL: 19.6 (ref 7–25)
CALCIUM SPEC-SCNC: 10.5 MG/DL (ref 8.6–10.5)
CHLORIDE SERPL-SCNC: 102 MMOL/L (ref 98–107)
CHOLEST SERPL-MCNC: 130 MG/DL (ref 0–200)
CO2 SERPL-SCNC: 19.2 MMOL/L (ref 22–29)
CREAT SERPL-MCNC: 1.48 MG/DL (ref 0.57–1)
DEPRECATED RDW RBC AUTO: 41.1 FL (ref 37–54)
EGFRCR SERPLBLD CKD-EPI 2021: 35.2 ML/MIN/1.73
EOSINOPHIL # BLD AUTO: 0.17 10*3/MM3 (ref 0–0.4)
EOSINOPHIL NFR BLD AUTO: 1.3 % (ref 0.3–6.2)
ERYTHROCYTE [DISTWIDTH] IN BLOOD BY AUTOMATED COUNT: 14.1 % (ref 12.3–15.4)
GLOBULIN UR ELPH-MCNC: 3.6 GM/DL
GLUCOSE SERPL-MCNC: 151 MG/DL (ref 65–99)
HBA1C MFR BLD: 6.8 % (ref 4.8–5.6)
HCT VFR BLD AUTO: 28.4 % (ref 34–46.6)
HDLC SERPL-MCNC: 34 MG/DL (ref 40–60)
HGB BLD-MCNC: 9.5 G/DL (ref 12–15.9)
IMM GRANULOCYTES # BLD AUTO: 0.21 10*3/MM3 (ref 0–0.05)
IMM GRANULOCYTES NFR BLD AUTO: 1.7 % (ref 0–0.5)
IRON 24H UR-MRATE: 27 MCG/DL (ref 37–145)
IRON SATN MFR SERPL: 10 % (ref 20–50)
LDLC SERPL CALC-MCNC: 68 MG/DL (ref 0–100)
LDLC/HDLC SERPL: 1.86 {RATIO}
LYMPHOCYTES # BLD AUTO: 2.4 10*3/MM3 (ref 0.7–3.1)
LYMPHOCYTES NFR BLD AUTO: 18.9 % (ref 19.6–45.3)
MCH RBC QN AUTO: 27.6 PG (ref 26.6–33)
MCHC RBC AUTO-ENTMCNC: 33.5 G/DL (ref 31.5–35.7)
MCV RBC AUTO: 82.6 FL (ref 79–97)
MONOCYTES # BLD AUTO: 0.86 10*3/MM3 (ref 0.1–0.9)
MONOCYTES NFR BLD AUTO: 6.8 % (ref 5–12)
NEUTROPHILS NFR BLD AUTO: 70.9 % (ref 42.7–76)
NEUTROPHILS NFR BLD AUTO: 8.99 10*3/MM3 (ref 1.7–7)
NRBC BLD AUTO-RTO: 0 /100 WBC (ref 0–0.2)
PLATELET # BLD AUTO: 400 10*3/MM3 (ref 140–450)
PMV BLD AUTO: 9.5 FL (ref 6–12)
POTASSIUM SERPL-SCNC: 4.3 MMOL/L (ref 3.5–5.2)
PROT SERPL-MCNC: 6.6 G/DL (ref 6–8.5)
RBC # BLD AUTO: 3.44 10*6/MM3 (ref 3.77–5.28)
SODIUM SERPL-SCNC: 135 MMOL/L (ref 136–145)
TIBC SERPL-MCNC: 259 MCG/DL (ref 298–536)
TRANSFERRIN SERPL-MCNC: 174 MG/DL (ref 200–360)
TRIGL SERPL-MCNC: 163 MG/DL (ref 0–150)
URATE SERPL-MCNC: 8 MG/DL (ref 2.4–5.7)
VLDLC SERPL-MCNC: 28 MG/DL (ref 5–40)
WBC NRBC COR # BLD: 12.68 10*3/MM3 (ref 3.4–10.8)

## 2022-07-11 PROCEDURE — 36415 COLL VENOUS BLD VENIPUNCTURE: CPT

## 2022-07-11 PROCEDURE — 84466 ASSAY OF TRANSFERRIN: CPT

## 2022-07-11 PROCEDURE — 80053 COMPREHEN METABOLIC PANEL: CPT

## 2022-07-11 PROCEDURE — 85025 COMPLETE CBC W/AUTO DIFF WBC: CPT

## 2022-07-11 PROCEDURE — 83036 HEMOGLOBIN GLYCOSYLATED A1C: CPT

## 2022-07-11 PROCEDURE — 84550 ASSAY OF BLOOD/URIC ACID: CPT

## 2022-07-11 PROCEDURE — 80061 LIPID PANEL: CPT

## 2022-07-11 PROCEDURE — 83540 ASSAY OF IRON: CPT

## 2022-07-12 ENCOUNTER — OFFICE VISIT (OUTPATIENT)
Dept: INTERNAL MEDICINE | Facility: CLINIC | Age: 82
End: 2022-07-12

## 2022-07-12 VITALS
HEIGHT: 66 IN | WEIGHT: 216.8 LBS | OXYGEN SATURATION: 93 % | DIASTOLIC BLOOD PRESSURE: 48 MMHG | HEART RATE: 102 BPM | TEMPERATURE: 97.3 F | SYSTOLIC BLOOD PRESSURE: 92 MMHG | BODY MASS INDEX: 34.84 KG/M2

## 2022-07-12 DIAGNOSIS — R05.3 PERSISTENT DRY COUGH: Primary | ICD-10-CM

## 2022-07-12 DIAGNOSIS — F41.9 ANXIETY: ICD-10-CM

## 2022-07-12 DIAGNOSIS — J18.9 PNEUMONIA OF LEFT LOWER LOBE DUE TO INFECTIOUS ORGANISM: ICD-10-CM

## 2022-07-12 DIAGNOSIS — F51.01 PRIMARY INSOMNIA: ICD-10-CM

## 2022-07-12 DIAGNOSIS — I48.11 LONGSTANDING PERSISTENT ATRIAL FIBRILLATION: ICD-10-CM

## 2022-07-12 DIAGNOSIS — I10 HYPERTENSION, ESSENTIAL: ICD-10-CM

## 2022-07-12 DIAGNOSIS — E55.9 VITAMIN D DEFICIENCY: ICD-10-CM

## 2022-07-12 DIAGNOSIS — E06.3 HYPOTHYROIDISM DUE TO HASHIMOTO'S THYROIDITIS: ICD-10-CM

## 2022-07-12 DIAGNOSIS — Z80.3 FH: BREAST CANCER: ICD-10-CM

## 2022-07-12 DIAGNOSIS — E03.8 HYPOTHYROIDISM DUE TO HASHIMOTO'S THYROIDITIS: ICD-10-CM

## 2022-07-12 DIAGNOSIS — N18.31 STAGE 3A CHRONIC KIDNEY DISEASE: ICD-10-CM

## 2022-07-12 DIAGNOSIS — E66.01 MORBID (SEVERE) OBESITY DUE TO EXCESS CALORIES: ICD-10-CM

## 2022-07-12 DIAGNOSIS — D50.9 IRON DEFICIENCY ANEMIA, UNSPECIFIED IRON DEFICIENCY ANEMIA TYPE: ICD-10-CM

## 2022-07-12 PROCEDURE — 99214 OFFICE O/P EST MOD 30 MIN: CPT | Performed by: INTERNAL MEDICINE

## 2022-07-12 RX ORDER — FAMOTIDINE 10 MG/ML
20 INJECTION, SOLUTION INTRAVENOUS AS NEEDED
Status: CANCELLED | OUTPATIENT
Start: 2022-07-14

## 2022-07-12 RX ORDER — FAMOTIDINE 10 MG/ML
20 INJECTION, SOLUTION INTRAVENOUS ONCE
Status: CANCELLED | OUTPATIENT
Start: 2022-07-14 | End: 2022-07-14

## 2022-07-12 RX ORDER — CETIRIZINE HYDROCHLORIDE 10 MG/1
10 TABLET ORAL ONCE
Status: CANCELLED | OUTPATIENT
Start: 2022-07-14 | End: 2022-07-14

## 2022-07-12 RX ORDER — ACETAMINOPHEN 325 MG/1
650 TABLET ORAL ONCE
Status: CANCELLED | OUTPATIENT
Start: 2022-07-14 | End: 2022-07-14

## 2022-07-12 RX ORDER — SODIUM CHLORIDE 9 MG/ML
250 INJECTION, SOLUTION INTRAVENOUS ONCE
Status: CANCELLED | OUTPATIENT
Start: 2022-07-14

## 2022-07-12 RX ORDER — DIPHENHYDRAMINE HCL 25 MG
25 CAPSULE ORAL ONCE
Status: CANCELLED | OUTPATIENT
Start: 2022-07-14 | End: 2022-07-14

## 2022-07-12 RX ORDER — DIPHENHYDRAMINE HYDROCHLORIDE 50 MG/ML
50 INJECTION INTRAMUSCULAR; INTRAVENOUS AS NEEDED
Status: CANCELLED | OUTPATIENT
Start: 2022-07-14

## 2022-07-12 RX ORDER — IPRATROPIUM BROMIDE AND ALBUTEROL SULFATE 2.5; .5 MG/3ML; MG/3ML
3 SOLUTION RESPIRATORY (INHALATION) EVERY 4 HOURS PRN
Qty: 360 ML | Refills: 3 | Status: SHIPPED | OUTPATIENT
Start: 2022-07-12 | End: 2023-04-04

## 2022-07-12 NOTE — PROGRESS NOTES
"Chief Complaint/ HPI: f/u with pneumonia, went to er ---July 4, 2022    Er notes reivewed, --cxr noted --- has taken a Z-Lenin and Keflex, chest x-ray showed left lower lobe infiltrates,    O2 sats have been dropping into the high 80s at times,  Objective   Vital Signs  Vitals:    07/12/22 1308   BP: 92/48   Pulse: 102   Temp: 97.3 °F (36.3 °C)   SpO2: 93%   Weight: 98.3 kg (216 lb 12.8 oz)   Height: 167.6 cm (65.98\")      Body mass index is 35.01 kg/m².  Review of Systems   Respiratory: Positive for cough and shortness of breath.       Physical Exam  Constitutional:       General: She is not in acute distress.     Appearance: Normal appearance. She is obese.   HENT:      Head: Normocephalic.      Mouth/Throat:      Mouth: Mucous membranes are moist.   Eyes:      Conjunctiva/sclera: Conjunctivae normal.      Pupils: Pupils are equal, round, and reactive to light.   Cardiovascular:      Rate and Rhythm: Normal rate. Rhythm irregular.      Pulses: Normal pulses.      Heart sounds: Murmur heard.   Pulmonary:      Effort: Pulmonary effort is normal.      Breath sounds: Normal breath sounds.   Abdominal:      General: Bowel sounds are normal.      Palpations: Abdomen is soft.   Musculoskeletal:         General: No swelling. Normal range of motion.      Cervical back: Neck supple.   Skin:     General: Skin is warm and dry.      Coloration: Skin is not jaundiced.   Neurological:      General: No focal deficit present.      Mental Status: She is alert and oriented to person, place, and time. Mental status is at baseline.   Psychiatric:         Mood and Affect: Mood normal.         Behavior: Behavior normal.         Thought Content: Thought content normal.         Judgment: Judgment normal.        Result Review :   Lab Results   Component Value Date    PROBNP 823.1 09/13/2021     01/09/2020     CMP    CMP 3/11/22 7/4/22 7/11/22   Glucose 109 (A) 244 (A) 151 (A)   BUN 40 (A) 40 (A) 29 (A)   Creatinine 1.87 (A) 1.83 (A) " 1.48 (A)   Sodium 143 138 135 (A)   Potassium 4.2 4.7 4.3   Chloride 108 (A) 105 102   Calcium 9.3 10.3 10.5   Albumin 3.80 2.80 (A) 3.00 (A)   Total Bilirubin 0.3 0.3 0.3   Alkaline Phosphatase 117 129 (A) 102   AST (SGOT) 15 15 16   ALT (SGPT) 13 20 13   (A) Abnormal value            CBC w/diff    CBC w/Diff 3/11/22 7/4/22 7/11/22   WBC 7.27 13.62 (A) 12.68 (A)   RBC 4.00 3.44 (A) 3.44 (A)   Hemoglobin 11.1 (A) 9.6 (A) 9.5 (A)   Hematocrit 33.3 (A) 29.1 (A) 28.4 (A)   MCV 83.3 84.6 82.6   MCH 27.8 27.9 27.6   MCHC 33.3 33.0 33.5   RDW 14.7 14.7 14.1   Platelets 195 350 400   Neutrophil Rel % 50.9 75.4 70.9   Immature Granulocyte Rel % 0.3 1.6 (A) 1.7 (A)   Lymphocyte Rel % 38.2 15.2 (A) 18.9 (A)   Monocyte Rel % 6.7 6.4 6.8   Eosinophil Rel % 3.6 1.2 1.3   Basophil Rel % 0.3 0.2 0.4   (A) Abnormal value             Lipid Panel    Lipid Panel 7/11/22   Total Cholesterol 130   Triglycerides 163 (A)   HDL Cholesterol 34 (A)   VLDL Cholesterol 28   LDL Cholesterol  68   LDL/HDL Ratio 1.86   (A) Abnormal value             Lab Results   Component Value Date    TSH 2.450 09/13/2021    TSH 2.960 04/12/2021    TSH 3.520 07/13/2020      Lab Results   Component Value Date    FREET4 1.25 09/13/2021      A1C Last 3 Results    HGBA1C Last 3 Results 9/13/21 3/11/22 7/11/22   Hemoglobin A1C 6.80 (A) 6.70 (A) 6.80 (A)   (A) Abnormal value                              Visit Diagnoses:    ICD-10-CM ICD-9-CM   1. Persistent dry cough  R05.3 786.2   2. Vitamin D deficiency  E55.9 268.9   3. Stage 3a chronic kidney disease (HCC)  N18.31 585.3   4. Morbid (severe) obesity due to excess calories (HCC)  E66.01 278.01   5. FH: breast cancer  Z80.3 V16.3   6. Hypertension, essential  I10 401.9   7. Anxiety  F41.9 300.00   8. Hypothyroidism due to Hashimoto's thyroiditis  E03.8 244.8    E06.3 245.2   9. Primary insomnia  F51.01 307.42   10. Pneumonia of left lower lobe due to infectious organism  J18.9 486   11. Iron deficiency anemia,  unspecified iron deficiency anemia type  D50.9 280.9   12. Longstanding persistent atrial fibrillation (HCC)  I48.11 427.31       Assessment and Plan   Diagnoses and all orders for this visit:    1. Persistent dry cough (Primary)  -     CT Chest Without Contrast Diagnostic; Future  -     Ambulatory Referral to [facility] For Infusion Treatment  -     Adult Transthoracic Echo Complete W/ Cont if Necessary Per Protocol; Future    2. Vitamin D deficiency  -     CT Chest Without Contrast Diagnostic; Future  -     Ambulatory Referral to [facility] For Infusion Treatment  -     Adult Transthoracic Echo Complete W/ Cont if Necessary Per Protocol; Future    3. Stage 3a chronic kidney disease (HCC)  -     CT Chest Without Contrast Diagnostic; Future  -     Ambulatory Referral to [facility] For Infusion Treatment  -     Adult Transthoracic Echo Complete W/ Cont if Necessary Per Protocol; Future    4. Morbid (severe) obesity due to excess calories (HCC)  -     CT Chest Without Contrast Diagnostic; Future  -     Ambulatory Referral to [facility] For Infusion Treatment  -     Adult Transthoracic Echo Complete W/ Cont if Necessary Per Protocol; Future    5. FH: breast cancer  -     CT Chest Without Contrast Diagnostic; Future  -     Ambulatory Referral to [facility] For Infusion Treatment  -     Adult Transthoracic Echo Complete W/ Cont if Necessary Per Protocol; Future    6. Hypertension, essential  -     CT Chest Without Contrast Diagnostic; Future  -     Ambulatory Referral to [facility] For Infusion Treatment  -     Adult Transthoracic Echo Complete W/ Cont if Necessary Per Protocol; Future    7. Anxiety  -     CT Chest Without Contrast Diagnostic; Future  -     Ambulatory Referral to [facility] For Infusion Treatment  -     Adult Transthoracic Echo Complete W/ Cont if Necessary Per Protocol; Future    8. Hypothyroidism due to Hashimoto's thyroiditis  -     CT Chest Without Contrast Diagnostic; Future  -     Ambulatory Referral to [facility] For Infusion Treatment  -     Adult  Transthoracic Echo Complete W/ Cont if Necessary Per Protocol; Future    9. Primary insomnia  -     CT Chest Without Contrast Diagnostic; Future  -     Ambulatory Referral to [facility] For Infusion Treatment  -     Adult Transthoracic Echo Complete W/ Cont if Necessary Per Protocol; Future    10. Pneumonia of left lower lobe due to infectious organism  -     CT Chest Without Contrast Diagnostic; Future  -     Ambulatory Referral to [facility] For Infusion Treatment  -     Adult Transthoracic Echo Complete W/ Cont if Necessary Per Protocol; Future    11. Iron deficiency anemia, unspecified iron deficiency anemia type  -     CT Chest Without Contrast Diagnostic; Future  -     Ambulatory Referral to [facility] For Infusion Treatment  -     Adult Transthoracic Echo Complete W/ Cont if Necessary Per Protocol; Future    12. Longstanding persistent atrial fibrillation (HCC)  -     Adult Transthoracic Echo Complete W/ Cont if Necessary Per Protocol; Future    Other orders  -     ipratropium-albuterol (DUO-NEB) 0.5-2.5 mg/3 ml nebulizer; Take 3 mL by nebulization Every 4 (Four) Hours As Needed for Wheezing.  Dispense: 360 mL; Refill: 3    Pneumonia left lower lobe July 6 fourth 2022, treated with Keflex and Zithromax, clinically some better but still short of breath at rest and with exertion, weakness,    Iron deficiency anemia, treatment options discussed, has previously received IV iron infusions in 2019 slight reaction, had colonoscopy Dr. Lopez 2019 multiple polyps,    Increased stress with home life stressors at home with family situation, will treat with Lexapro 5 mg daily, patient complains of urticaria itching all over all the time thinks it stress related, will start with some Pepcid and some hydroxyzine low doses, March 14, 2022    ALLERGIES ----COUGH , BETTER , CXR shows a 1.6 cm rounded nodule right middle lobe near the diaphragm, October 27, 2020 --- follow-up CAT scan does not show any evidence of this , she does have some  trace pleural effusions and a left parapelvic or renal cyst versus mild hydronephrosis incompletely visualized on the CT scan November 2020     renal cyst versus mass, ultrasound of the kidneys November 2020 showed simple renal cysts no further workup,    Heart murmur,----- echocardiogram shows only trace mitral regurgitation and normal ejection fraction October 2020    Subclinical hypothyroid---cont  Synthroid 0.05 mg daily     Lower extremity edema shortness of breath, ,  stable September 2021 --cont -Lasix 40 mg daily when necessary    s/p L TKA, 4/16,     chronic A. 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 8 April 2021 and stable September 2021,, previously up to 8.0 November 2020 -    Cont Lantus 60 units daily, Januvia 50 mg daily,, Glucovance 2.5\500 mg twice a day,     EYES CHECK BLOOM / BENNET --MARCH 2021    ELEVATED LFTS, BETTER --ALT , AST , BOTH NL NOW, May 2019, September 2019 ,JAN 2020, In July 2020, normal, November 2020, September 2021    VIT D DEF continues on replacement September 2021    ELEVATED CHOL--patient intolerant of statins discussed,    INSOMNIA, ON OTC BENADRYL     CKD 3 - CREAT Up to 1.8, , to 1.9, stable over the past 1-2 years, as of April 2021,                     Follow Up   No follow-ups on file.  Patient was given instructions and counseling regarding her condition or for health maintenance advice. Please see specific information pulled into the  AVS if appropriate.

## 2022-07-13 RX ORDER — SODIUM CHLORIDE 9 MG/ML
250 INJECTION, SOLUTION INTRAVENOUS ONCE
Status: CANCELLED | OUTPATIENT
Start: 2022-07-14

## 2022-07-18 ENCOUNTER — TELEPHONE (OUTPATIENT)
Dept: INTERNAL MEDICINE | Facility: CLINIC | Age: 82
End: 2022-07-18

## 2022-07-18 NOTE — TELEPHONE ENCOUNTER
Caller: Luzma Dick    Relationship: Self    Best call back number: [phone]    Requested Prescriptions:   Requested Prescriptions     Pending Prescriptions Disp Refills   • apixaban (ELIQUIS) 5 MG tablet tablet 90 tablet 1     Sig: Take 1 tablet by mouth 2 (Two) Times a Day.        Pharmacy where request should be sent: [facility] -  [place], [place] - [address] - [phone] [facility] [phone]     Does the patient have less than a 3 day supply:  [x] Yes  [] No    Shelbi Conteh Rep   07/18/22 11:29 EDT

## 2022-07-20 ENCOUNTER — HOSPITAL ENCOUNTER (OUTPATIENT)
Dept: INFUSION THERAPY | Facility: HOSPITAL | Age: 82
Setting detail: INFUSION SERIES
Discharge: HOME | End: 2022-07-20

## 2022-07-20 VITALS
WEIGHT: 215.61 LBS | OXYGEN SATURATION: 99 % | HEART RATE: 68 BPM | BODY MASS INDEX: 35.92 KG/M2 | DIASTOLIC BLOOD PRESSURE: 68 MMHG | TEMPERATURE: 97.6 F | RESPIRATION RATE: 18 BRPM | HEIGHT: 65 IN | SYSTOLIC BLOOD PRESSURE: 126 MMHG

## 2022-07-20 DIAGNOSIS — R74.8 ELEVATED LIVER ENZYMES: ICD-10-CM

## 2022-07-20 DIAGNOSIS — E03.8 HYPOTHYROIDISM DUE TO HASHIMOTO'S THYROIDITIS: ICD-10-CM

## 2022-07-20 DIAGNOSIS — M19.90 ARTHRITIS: ICD-10-CM

## 2022-07-20 DIAGNOSIS — C50.411 MALIGNANT NEOPLASM OF UPPER-OUTER QUADRANT OF RIGHT FEMALE BREAST, UNSPECIFIED ESTROGEN RECEPTOR STATUS: ICD-10-CM

## 2022-07-20 DIAGNOSIS — E66.01 MORBID (SEVERE) OBESITY DUE TO EXCESS CALORIES: ICD-10-CM

## 2022-07-20 DIAGNOSIS — I10 HYPERTENSION, ESSENTIAL: ICD-10-CM

## 2022-07-20 DIAGNOSIS — N18.31 STAGE 3A CHRONIC KIDNEY DISEASE: ICD-10-CM

## 2022-07-20 DIAGNOSIS — E55.9 VITAMIN D DEFICIENCY: ICD-10-CM

## 2022-07-20 DIAGNOSIS — E13.9 DIABETES 1.5, MANAGED AS TYPE 2: ICD-10-CM

## 2022-07-20 DIAGNOSIS — L29.9 PRURITUS: ICD-10-CM

## 2022-07-20 DIAGNOSIS — I48.11 LONGSTANDING PERSISTENT ATRIAL FIBRILLATION: ICD-10-CM

## 2022-07-20 DIAGNOSIS — E11.69 TYPE 2 DIABETES MELLITUS WITH OTHER SPECIFIED COMPLICATION, UNSPECIFIED WHETHER LONG TERM INSULIN USE: ICD-10-CM

## 2022-07-20 DIAGNOSIS — Z12.31 SCREENING MAMMOGRAM FOR BREAST CANCER: ICD-10-CM

## 2022-07-20 DIAGNOSIS — Z80.3 FH: BREAST CANCER: ICD-10-CM

## 2022-07-20 DIAGNOSIS — C50.919 MALIGNANT NEOPLASM OF FEMALE BREAST, UNSPECIFIED ESTROGEN RECEPTOR STATUS, UNSPECIFIED LATERALITY, UNSPECIFIED SITE OF BREAST: ICD-10-CM

## 2022-07-20 DIAGNOSIS — L30.9 DERMATITIS: ICD-10-CM

## 2022-07-20 DIAGNOSIS — R05.3 PERSISTENT DRY COUGH: ICD-10-CM

## 2022-07-20 DIAGNOSIS — E06.3 HYPOTHYROIDISM DUE TO HASHIMOTO'S THYROIDITIS: ICD-10-CM

## 2022-07-20 DIAGNOSIS — M54.50 ACUTE LEFT-SIDED LOW BACK PAIN WITHOUT SCIATICA: ICD-10-CM

## 2022-07-20 DIAGNOSIS — H72.92 PERFORATION OF LEFT TYMPANIC MEMBRANE: ICD-10-CM

## 2022-07-20 DIAGNOSIS — F41.9 ANXIETY: ICD-10-CM

## 2022-07-20 DIAGNOSIS — F51.01 PRIMARY INSOMNIA: ICD-10-CM

## 2022-07-20 DIAGNOSIS — J18.9 PNEUMONIA OF LEFT LOWER LOBE DUE TO INFECTIOUS ORGANISM: ICD-10-CM

## 2022-07-20 DIAGNOSIS — D50.9 IRON DEFICIENCY ANEMIA, UNSPECIFIED IRON DEFICIENCY ANEMIA TYPE: Primary | ICD-10-CM

## 2022-07-20 PROCEDURE — 96374 THER/PROPH/DIAG INJ IV PUSH: CPT

## 2022-07-20 PROCEDURE — 63710000001 DIPHENHYDRAMINE PER 50 MG: Performed by: INTERNAL MEDICINE

## 2022-07-20 PROCEDURE — 96375 TX/PRO/DX INJ NEW DRUG ADDON: CPT

## 2022-07-20 PROCEDURE — 25010000002 FERUMOXYTOL 510 MG/17ML SOLUTION 17 ML VIAL: Performed by: INTERNAL MEDICINE

## 2022-07-20 RX ORDER — CETIRIZINE HYDROCHLORIDE 10 MG/1
10 TABLET ORAL ONCE
Status: COMPLETED | OUTPATIENT
Start: 2022-07-20 | End: 2022-07-20

## 2022-07-20 RX ORDER — DIPHENHYDRAMINE HCL 25 MG
25 CAPSULE ORAL ONCE
Status: COMPLETED | OUTPATIENT
Start: 2022-07-20 | End: 2022-07-20

## 2022-07-20 RX ORDER — SODIUM CHLORIDE 9 MG/ML
250 INJECTION, SOLUTION INTRAVENOUS ONCE
Status: CANCELLED | OUTPATIENT
Start: 2022-07-27

## 2022-07-20 RX ORDER — ACETAMINOPHEN 325 MG/1
650 TABLET ORAL ONCE
Status: COMPLETED | OUTPATIENT
Start: 2022-07-20 | End: 2022-07-20

## 2022-07-20 RX ORDER — FAMOTIDINE 10 MG/ML
20 INJECTION, SOLUTION INTRAVENOUS ONCE
Status: CANCELLED | OUTPATIENT
Start: 2022-07-27 | End: 2022-07-27

## 2022-07-20 RX ORDER — ACETAMINOPHEN 325 MG/1
650 TABLET ORAL ONCE
Status: CANCELLED | OUTPATIENT
Start: 2022-07-27 | End: 2022-07-27

## 2022-07-20 RX ORDER — DIPHENHYDRAMINE HCL 25 MG
25 CAPSULE ORAL ONCE
Status: CANCELLED | OUTPATIENT
Start: 2022-07-27 | End: 2022-07-27

## 2022-07-20 RX ORDER — FAMOTIDINE 10 MG/ML
20 INJECTION, SOLUTION INTRAVENOUS AS NEEDED
OUTPATIENT
Start: 2022-07-27

## 2022-07-20 RX ORDER — DIPHENHYDRAMINE HYDROCHLORIDE 50 MG/ML
50 INJECTION INTRAMUSCULAR; INTRAVENOUS AS NEEDED
OUTPATIENT
Start: 2022-07-27

## 2022-07-20 RX ORDER — FAMOTIDINE 10 MG/ML
20 INJECTION, SOLUTION INTRAVENOUS ONCE
Status: COMPLETED | OUTPATIENT
Start: 2022-07-20 | End: 2022-07-20

## 2022-07-20 RX ORDER — CETIRIZINE HYDROCHLORIDE 10 MG/1
10 TABLET ORAL ONCE
Status: CANCELLED | OUTPATIENT
Start: 2022-07-27 | End: 2022-07-27

## 2022-07-20 RX ADMIN — ACETAMINOPHEN 650 MG: 325 TABLET ORAL at 12:58

## 2022-07-20 RX ADMIN — FAMOTIDINE 20 MG: 10 INJECTION INTRAVENOUS at 12:58

## 2022-07-20 RX ADMIN — CETIRIZINE HYDROCHLORIDE 10 MG: 10 TABLET, FILM COATED ORAL at 12:58

## 2022-07-20 RX ADMIN — DIPHENHYDRAMINE HYDROCHLORIDE 25 MG: 25 CAPSULE ORAL at 12:58

## 2022-07-20 RX ADMIN — FERUMOXYTOL 510 MG: 510 INJECTION INTRAVENOUS at 13:44

## 2022-07-27 ENCOUNTER — HOSPITAL ENCOUNTER (OUTPATIENT)
Dept: INFUSION THERAPY | Facility: HOSPITAL | Age: 82
Setting detail: INFUSION SERIES
Discharge: HOME | End: 2022-07-27

## 2022-07-27 VITALS
OXYGEN SATURATION: 98 % | TEMPERATURE: 97.8 F | RESPIRATION RATE: 20 BRPM | HEART RATE: 87 BPM | DIASTOLIC BLOOD PRESSURE: 48 MMHG | SYSTOLIC BLOOD PRESSURE: 104 MMHG

## 2022-07-27 DIAGNOSIS — N18.31 STAGE 3A CHRONIC KIDNEY DISEASE: ICD-10-CM

## 2022-07-27 DIAGNOSIS — D50.9 IRON DEFICIENCY ANEMIA, UNSPECIFIED IRON DEFICIENCY ANEMIA TYPE: Primary | ICD-10-CM

## 2022-07-27 PROCEDURE — 25010000002 FERUMOXYTOL 510 MG/17ML SOLUTION 17 ML VIAL: Performed by: INTERNAL MEDICINE

## 2022-07-27 PROCEDURE — 96365 THER/PROPH/DIAG IV INF INIT: CPT

## 2022-07-27 PROCEDURE — 63710000001 DIPHENHYDRAMINE PER 50 MG: Performed by: INTERNAL MEDICINE

## 2022-07-27 PROCEDURE — 96375 TX/PRO/DX INJ NEW DRUG ADDON: CPT

## 2022-07-27 PROCEDURE — 96374 THER/PROPH/DIAG INJ IV PUSH: CPT

## 2022-07-27 RX ORDER — DIPHENHYDRAMINE HCL 25 MG
25 CAPSULE ORAL ONCE
Status: CANCELLED | OUTPATIENT
Start: 2022-08-03 | End: 2022-08-03

## 2022-07-27 RX ORDER — FAMOTIDINE 10 MG/ML
20 INJECTION, SOLUTION INTRAVENOUS ONCE
Status: CANCELLED | OUTPATIENT
Start: 2022-08-03 | End: 2022-08-03

## 2022-07-27 RX ORDER — DIPHENHYDRAMINE HYDROCHLORIDE 50 MG/ML
50 INJECTION INTRAMUSCULAR; INTRAVENOUS AS NEEDED
OUTPATIENT
Start: 2022-08-03

## 2022-07-27 RX ORDER — SODIUM CHLORIDE 9 MG/ML
250 INJECTION, SOLUTION INTRAVENOUS ONCE
Status: DISCONTINUED | OUTPATIENT
Start: 2022-07-27 | End: 2022-07-29 | Stop reason: HOSPADM

## 2022-07-27 RX ORDER — ACETAMINOPHEN 325 MG/1
650 TABLET ORAL ONCE
Status: CANCELLED | OUTPATIENT
Start: 2022-08-03 | End: 2022-08-03

## 2022-07-27 RX ORDER — CETIRIZINE HYDROCHLORIDE 10 MG/1
10 TABLET ORAL ONCE
Status: CANCELLED | OUTPATIENT
Start: 2022-08-03 | End: 2022-08-03

## 2022-07-27 RX ORDER — FAMOTIDINE 10 MG/ML
20 INJECTION, SOLUTION INTRAVENOUS ONCE
Status: COMPLETED | OUTPATIENT
Start: 2022-07-27 | End: 2022-07-27

## 2022-07-27 RX ORDER — SODIUM CHLORIDE 9 MG/ML
250 INJECTION, SOLUTION INTRAVENOUS ONCE
OUTPATIENT
Start: 2022-08-03

## 2022-07-27 RX ORDER — ACETAMINOPHEN 325 MG/1
650 TABLET ORAL ONCE
Status: COMPLETED | OUTPATIENT
Start: 2022-07-27 | End: 2022-07-27

## 2022-07-27 RX ORDER — FAMOTIDINE 10 MG/ML
20 INJECTION, SOLUTION INTRAVENOUS AS NEEDED
OUTPATIENT
Start: 2022-08-03

## 2022-07-27 RX ORDER — CETIRIZINE HYDROCHLORIDE 10 MG/1
10 TABLET ORAL ONCE
Status: COMPLETED | OUTPATIENT
Start: 2022-07-27 | End: 2022-07-27

## 2022-07-27 RX ORDER — DIPHENHYDRAMINE HCL 25 MG
25 CAPSULE ORAL ONCE
Status: COMPLETED | OUTPATIENT
Start: 2022-07-27 | End: 2022-07-27

## 2022-07-27 RX ADMIN — CETIRIZINE HYDROCHLORIDE 10 MG: 10 TABLET, FILM COATED ORAL at 12:53

## 2022-07-27 RX ADMIN — FERUMOXYTOL 510 MG: 510 INJECTION INTRAVENOUS at 13:13

## 2022-07-27 RX ADMIN — DIPHENHYDRAMINE HYDROCHLORIDE 25 MG: 25 CAPSULE ORAL at 12:53

## 2022-07-27 RX ADMIN — ACETAMINOPHEN 650 MG: 325 TABLET ORAL at 12:53

## 2022-07-27 RX ADMIN — FAMOTIDINE 20 MG: 10 INJECTION, SOLUTION INTRAVENOUS at 12:53

## 2022-08-01 ENCOUNTER — HOSPITAL ENCOUNTER (OUTPATIENT)
Dept: CT IMAGING | Facility: HOSPITAL | Age: 82
Discharge: HOME | End: 2022-08-01

## 2022-08-01 ENCOUNTER — HOSPITAL ENCOUNTER (OUTPATIENT)
Dept: CARDIOLOGY | Facility: HOSPITAL | Age: 82
Discharge: HOME | End: 2022-08-01

## 2022-08-01 DIAGNOSIS — D50.9 IRON DEFICIENCY ANEMIA, UNSPECIFIED IRON DEFICIENCY ANEMIA TYPE: ICD-10-CM

## 2022-08-01 DIAGNOSIS — Z80.3 FH: BREAST CANCER: ICD-10-CM

## 2022-08-01 DIAGNOSIS — I48.11 LONGSTANDING PERSISTENT ATRIAL FIBRILLATION: ICD-10-CM

## 2022-08-01 DIAGNOSIS — E03.8 HYPOTHYROIDISM DUE TO HASHIMOTO'S THYROIDITIS: ICD-10-CM

## 2022-08-01 DIAGNOSIS — F41.9 ANXIETY: ICD-10-CM

## 2022-08-01 DIAGNOSIS — R05.3 PERSISTENT DRY COUGH: ICD-10-CM

## 2022-08-01 DIAGNOSIS — I10 HYPERTENSION, ESSENTIAL: ICD-10-CM

## 2022-08-01 DIAGNOSIS — J18.9 PNEUMONIA OF LEFT LOWER LOBE DUE TO INFECTIOUS ORGANISM: ICD-10-CM

## 2022-08-01 DIAGNOSIS — E06.3 HYPOTHYROIDISM DUE TO HASHIMOTO'S THYROIDITIS: ICD-10-CM

## 2022-08-01 DIAGNOSIS — F51.01 PRIMARY INSOMNIA: ICD-10-CM

## 2022-08-01 DIAGNOSIS — E55.9 VITAMIN D DEFICIENCY: ICD-10-CM

## 2022-08-01 DIAGNOSIS — N18.31 STAGE 3A CHRONIC KIDNEY DISEASE: ICD-10-CM

## 2022-08-01 DIAGNOSIS — E66.01 MORBID (SEVERE) OBESITY DUE TO EXCESS CALORIES: ICD-10-CM

## 2022-08-01 LAB
BH CV ECHO MEAS - AO ROOT DIAM: 2.7 CM
BH CV ECHO MEAS - EF(MOD-BP): 51.4 %
BH CV ECHO MEAS - IVSD: 1 CM
BH CV ECHO MEAS - LA DIMENSION: 3.9 CM
BH CV ECHO MEAS - LAT PEAK E' VEL: 6.4 CM/SEC
BH CV ECHO MEAS - LVIDD: 4.6 CM
BH CV ECHO MEAS - LVIDS: 3.5 CM
BH CV ECHO MEAS - LVOT DIAM: 2.1 CM
BH CV ECHO MEAS - LVPWD: 1.1 CM
BH CV ECHO MEAS - MR MAX PG: 97 MMHG
BH CV ECHO MEAS - MR MAX VEL: 494 CM/SEC
BH CV ECHO MEAS - MR MEAN PG: 66 MMHG
BH CV ECHO MEAS - MR MEAN VEL: 387 CM/SEC
BH CV ECHO MEAS - MR VTI: 179 CM
BH CV ECHO MEAS - MV A MAX VEL: 104 CM/SEC
BH CV ECHO MEAS - MV DEC TIME: 101 MSEC
BH CV ECHO MEAS - MV E MAX VEL: 79.2 CM/SEC
BH CV ECHO MEAS - MV E/A: 0.8
BH CV ECHO MEAS - RVDD: 2.5 CM
BH CV ECHO MEAS - RVSP: 26 MMHG
BH CV ECHO MEAS - TR MAX PG: 21 MMHG
BH CV ECHO MEAS - TR MAX VEL: 228 CM/SEC
IVRT: 114 MSEC
LEFT ATRIUM VOLUME INDEX: 29 ML/M2
MAXIMAL PREDICTED HEART RATE: 138 BPM
PISA RADIUS: 0.3 CM
STRESS TARGET HR: 117 BPM

## 2022-08-01 PROCEDURE — 93306 TTE W/DOPPLER COMPLETE: CPT | Performed by: INTERNAL MEDICINE

## 2022-08-01 PROCEDURE — 93306 TTE W/DOPPLER COMPLETE: CPT

## 2022-08-01 PROCEDURE — 71250 CT THORAX DX C-: CPT

## 2022-08-03 ENCOUNTER — OFFICE VISIT (OUTPATIENT)
Dept: INTERNAL MEDICINE | Facility: CLINIC | Age: 82
End: 2022-08-03

## 2022-08-03 VITALS
HEART RATE: 91 BPM | HEIGHT: 65 IN | DIASTOLIC BLOOD PRESSURE: 78 MMHG | WEIGHT: 224.2 LBS | TEMPERATURE: 98 F | SYSTOLIC BLOOD PRESSURE: 128 MMHG | BODY MASS INDEX: 37.36 KG/M2 | OXYGEN SATURATION: 97 %

## 2022-08-03 DIAGNOSIS — Z79.4 TYPE 2 DIABETES MELLITUS WITHOUT COMPLICATION, WITH LONG-TERM CURRENT USE OF INSULIN: ICD-10-CM

## 2022-08-03 DIAGNOSIS — J18.9 PNEUMONIA OF LEFT LOWER LOBE DUE TO INFECTIOUS ORGANISM: ICD-10-CM

## 2022-08-03 DIAGNOSIS — I10 HYPERTENSION, ESSENTIAL: ICD-10-CM

## 2022-08-03 DIAGNOSIS — R74.8 ELEVATED LIVER ENZYMES: ICD-10-CM

## 2022-08-03 DIAGNOSIS — E03.8 HYPOTHYROIDISM DUE TO HASHIMOTO'S THYROIDITIS: ICD-10-CM

## 2022-08-03 DIAGNOSIS — D50.9 IRON DEFICIENCY ANEMIA, UNSPECIFIED IRON DEFICIENCY ANEMIA TYPE: ICD-10-CM

## 2022-08-03 DIAGNOSIS — E06.3 HYPOTHYROIDISM DUE TO HASHIMOTO'S THYROIDITIS: ICD-10-CM

## 2022-08-03 DIAGNOSIS — R05.3 PERSISTENT DRY COUGH: Primary | ICD-10-CM

## 2022-08-03 DIAGNOSIS — E11.9 TYPE 2 DIABETES MELLITUS WITHOUT COMPLICATION, WITH LONG-TERM CURRENT USE OF INSULIN: ICD-10-CM

## 2022-08-03 DIAGNOSIS — F51.01 PRIMARY INSOMNIA: ICD-10-CM

## 2022-08-03 DIAGNOSIS — E55.9 VITAMIN D DEFICIENCY: ICD-10-CM

## 2022-08-03 DIAGNOSIS — E66.01 MORBID (SEVERE) OBESITY DUE TO EXCESS CALORIES: ICD-10-CM

## 2022-08-03 DIAGNOSIS — N18.31 STAGE 3A CHRONIC KIDNEY DISEASE: ICD-10-CM

## 2022-08-03 DIAGNOSIS — F41.9 ANXIETY: ICD-10-CM

## 2022-08-03 PROCEDURE — 99214 OFFICE O/P EST MOD 30 MIN: CPT | Performed by: INTERNAL MEDICINE

## 2022-08-03 NOTE — PROGRESS NOTES
"Chief Complaint/ HPI: Follow-up with upper respiratory infection pneumonia chest x-ray results, CT scan,      Objective   Vital Signs  Vitals:    08/03/22 1142   BP: 128/78   Pulse: 91   Temp: 98 °F (36.7 °C)   SpO2: 97%   Weight: 102 kg (224 lb 3.2 oz)   Height: 165.1 cm (65\")      Body mass index is 37.31 kg/m².  Review of Systems   Constitutional: Negative.    HENT: Negative.    Eyes: Negative.    Respiratory: Positive for cough and wheezing.    Cardiovascular: Negative.    Gastrointestinal: Negative.    Endocrine: Negative.    Genitourinary: Negative.    Musculoskeletal: Negative.    Allergic/Immunologic: Negative.    Neurological: Negative.    Hematological: Negative.    Psychiatric/Behavioral: Negative.       Physical Exam  Constitutional:       General: She is not in acute distress.     Appearance: Normal appearance. She is obese.   HENT:      Head: Normocephalic.      Mouth/Throat:      Mouth: Mucous membranes are moist.   Eyes:      Conjunctiva/sclera: Conjunctivae normal.      Pupils: Pupils are equal, round, and reactive to light.   Cardiovascular:      Rate and Rhythm: Normal rate and regular rhythm.      Pulses: Normal pulses.      Heart sounds: Normal heart sounds.   Pulmonary:      Effort: Pulmonary effort is normal.      Breath sounds: Wheezing present.   Abdominal:      General: Bowel sounds are normal.      Palpations: Abdomen is soft.   Musculoskeletal:         General: No swelling. Normal range of motion.      Cervical back: Neck supple.   Skin:     General: Skin is warm and dry.      Coloration: Skin is not jaundiced.   Neurological:      General: No focal deficit present.      Mental Status: She is alert and oriented to person, place, and time. Mental status is at baseline.   Psychiatric:         Mood and Affect: Mood normal.         Behavior: Behavior normal.         Thought Content: Thought content normal.         Judgment: Judgment normal.        Result Review :   Lab Results   Component " Value Date    PROBNP 823.1 09/13/2021     01/09/2020     CMP    CMP 3/11/22 7/4/22 7/11/22   Glucose 109 (A) 244 (A) 151 (A)   BUN 40 (A) 40 (A) 29 (A)   Creatinine 1.87 (A) 1.83 (A) 1.48 (A)   Sodium 143 138 135 (A)   Potassium 4.2 4.7 4.3   Chloride 108 (A) 105 102   Calcium 9.3 10.3 10.5   Albumin 3.80 2.80 (A) 3.00 (A)   Total Bilirubin 0.3 0.3 0.3   Alkaline Phosphatase 117 129 (A) 102   AST (SGOT) 15 15 16   ALT (SGPT) 13 20 13   (A) Abnormal value            CBC w/diff    CBC w/Diff 3/11/22 7/4/22 7/11/22   WBC 7.27 13.62 (A) 12.68 (A)   RBC 4.00 3.44 (A) 3.44 (A)   Hemoglobin 11.1 (A) 9.6 (A) 9.5 (A)   Hematocrit 33.3 (A) 29.1 (A) 28.4 (A)   MCV 83.3 84.6 82.6   MCH 27.8 27.9 27.6   MCHC 33.3 33.0 33.5   RDW 14.7 14.7 14.1   Platelets 195 350 400   Neutrophil Rel % 50.9 75.4 70.9   Immature Granulocyte Rel % 0.3 1.6 (A) 1.7 (A)   Lymphocyte Rel % 38.2 15.2 (A) 18.9 (A)   Monocyte Rel % 6.7 6.4 6.8   Eosinophil Rel % 3.6 1.2 1.3   Basophil Rel % 0.3 0.2 0.4   (A) Abnormal value             Lipid Panel    Lipid Panel 7/11/22   Total Cholesterol 130   Triglycerides 163 (A)   HDL Cholesterol 34 (A)   VLDL Cholesterol 28   LDL Cholesterol  68   LDL/HDL Ratio 1.86   (A) Abnormal value             Lab Results   Component Value Date    TSH 2.450 09/13/2021    TSH 2.960 04/12/2021    TSH 3.520 07/13/2020      Lab Results   Component Value Date    FREET4 1.25 09/13/2021      A1C Last 3 Results    HGBA1C Last 3 Results 9/13/21 3/11/22 7/11/22   Hemoglobin A1C 6.80 (A) 6.70 (A) 6.80 (A)   (A) Abnormal value                              Visit Diagnoses:    ICD-10-CM ICD-9-CM   1. Persistent dry cough  R05.3 786.2   2. Pneumonia of left lower lobe due to infectious organism  J18.9 486   3. Stage 3a chronic kidney disease (HCC)  N18.31 585.3   4. Morbid (severe) obesity due to excess calories (HCC)  E66.01 278.01   5. Hypothyroidism due to Hashimoto's thyroiditis  E03.8 244.8    E06.3 245.2   6. Anxiety  F41.9  300.00   7. Primary insomnia  F51.01 307.42   8. Hypertension, essential  I10 401.9   9. Vitamin D deficiency  E55.9 268.9   10. Type 2 diabetes mellitus without complication, with long-term current use of insulin (HCC)  E11.9 250.00    Z79.4 V58.67   11. Elevated liver enzymes  R74.8 790.5   12. Iron deficiency anemia, unspecified iron deficiency anemia type  D50.9 280.9       Assessment and Plan   Diagnoses and all orders for this visit:    1. Persistent dry cough (Primary)  -     Vitamin D 25 Hydroxy; Future  -     TSH+Free T4; Future  -     Magnesium; Future  -     Lipid Panel; Future  -     Hemoglobin A1c; Future  -     Comprehensive Metabolic Panel; Future  -     CBC & Differential; Future  -     Ambulatory Referral to Pulmonology    2. Pneumonia of left lower lobe due to infectious organism  -     Vitamin D 25 Hydroxy; Future  -     TSH+Free T4; Future  -     Magnesium; Future  -     Lipid Panel; Future  -     Hemoglobin A1c; Future  -     Comprehensive Metabolic Panel; Future  -     CBC & Differential; Future  -     Ambulatory Referral to Pulmonology    3. Stage 3a chronic kidney disease (HCC)  -     Vitamin D 25 Hydroxy; Future  -     TSH+Free T4; Future  -     Magnesium; Future  -     Lipid Panel; Future  -     Hemoglobin A1c; Future  -     Comprehensive Metabolic Panel; Future  -     CBC & Differential; Future  -     Ambulatory Referral to Pulmonology    4. Morbid (severe) obesity due to excess calories (HCC)  -     Vitamin D 25 Hydroxy; Future  -     TSH+Free T4; Future  -     Magnesium; Future  -     Lipid Panel; Future  -     Hemoglobin A1c; Future  -     Comprehensive Metabolic Panel; Future  -     CBC & Differential; Future  -     Ambulatory Referral to Pulmonology    5. Hypothyroidism due to Hashimoto's thyroiditis  -     Vitamin D 25 Hydroxy; Future  -     TSH+Free T4; Future  -     Magnesium; Future  -     Lipid Panel; Future  -     Hemoglobin A1c; Future  -     Comprehensive Metabolic Panel;  Future  -     CBC & Differential; Future  -     Ambulatory Referral to Pulmonology    6. Anxiety  -     Vitamin D 25 Hydroxy; Future  -     TSH+Free T4; Future  -     Magnesium; Future  -     Lipid Panel; Future  -     Hemoglobin A1c; Future  -     Comprehensive Metabolic Panel; Future  -     CBC & Differential; Future  -     Ambulatory Referral to Pulmonology    7. Primary insomnia  -     Vitamin D 25 Hydroxy; Future  -     TSH+Free T4; Future  -     Magnesium; Future  -     Lipid Panel; Future  -     Hemoglobin A1c; Future  -     Comprehensive Metabolic Panel; Future  -     CBC & Differential; Future  -     Ambulatory Referral to Pulmonology    8. Hypertension, essential  -     Vitamin D 25 Hydroxy; Future  -     TSH+Free T4; Future  -     Magnesium; Future  -     Lipid Panel; Future  -     Hemoglobin A1c; Future  -     Comprehensive Metabolic Panel; Future  -     CBC & Differential; Future  -     Ambulatory Referral to Pulmonology    9. Vitamin D deficiency  -     Vitamin D 25 Hydroxy; Future  -     TSH+Free T4; Future  -     Magnesium; Future  -     Lipid Panel; Future  -     Hemoglobin A1c; Future  -     Comprehensive Metabolic Panel; Future  -     CBC & Differential; Future  -     Ambulatory Referral to Pulmonology    10. Type 2 diabetes mellitus without complication, with long-term current use of insulin (HCC)  -     Vitamin D 25 Hydroxy; Future  -     TSH+Free T4; Future  -     Magnesium; Future  -     Lipid Panel; Future  -     Hemoglobin A1c; Future  -     Comprehensive Metabolic Panel; Future  -     CBC & Differential; Future  -     Ambulatory Referral to Pulmonology    11. Elevated liver enzymes  -     Vitamin D 25 Hydroxy; Future  -     TSH+Free T4; Future  -     Magnesium; Future  -     Lipid Panel; Future  -     Hemoglobin A1c; Future  -     Comprehensive Metabolic Panel; Future  -     CBC & Differential; Future  -     Ambulatory Referral to Pulmonology    12. Iron deficiency anemia, unspecified iron  deficiency anemia type  -     Vitamin D 25 Hydroxy; Future  -     TSH+Free T4; Future  -     Magnesium; Future  -     Lipid Panel; Future  -     Hemoglobin A1c; Future  -     Comprehensive Metabolic Panel; Future  -     CBC & Differential; Future  -     Ambulatory Referral to Pulmonology        Pneumonia left lower lobe July 4, 2022,--Per emergency room, treated with Keflex and Zithromax, clinically some better but still short of breath at rest and with exertion, weakness, follow-up CT scan, August 1, 2022 of the chest, shows persistent consolidation left upper lobe with air bronchograms likely representing pneumonia tree-in-bud nodularity and opacity in the more superior upper lobe is also present recommend continued follow-up, discussed with patient May need pulmonology referral, cholelithiasis was present, no other adenopathy noted discussed with patient treatment options to include repeat CT scan in 3 months and pulmonary follow-up we will do both, referral made to Dr. Velásquez's office August 3, 2022    Iron deficiency anemia,, hemoglobin 9.5 hematocrit 28.4 July 11, 2022 iron levels low at 27 with a 10% saturation, ----had colonoscopy Dr. Lopez 2019 multiple polyps, patient received iron infusions July 2022 feeling some better, had previously had iron infusions in 2019    Increased stress with home life stressors at home with family situation, patient stopped Lexapro previously    urticaria itching all over all the time thinks it stress related, will start with some Pepcid and some hydroxyzine low doses, March 14, 2022, has resolved and patient is off medications as of August 3, 2022    ALLERGIES ----COUGH , BETTER , CXR shows a 1.6 cm rounded nodule right middle lobe near the diaphragm, October 27, 2020 --- follow-up CAT scan does not show any evidence of this , she does have some trace pleural effusions and a left parapelvic or renal cyst versus mild hydronephrosis incompletely visualized on the CT scan  November 2020     renal cyst versus mass, ultrasound of the kidneys November 2020 showed simple renal cysts no further workup,    Heart murmur,----- echocardiogram shows only trace mitral regurgitation and normal ejection fraction October 2020,, repeat echo shows low normal ejection fraction 50% to 55% no regional wall motion abnormalities there was some diastolic dysfunction borderline left atrial enlargement mild to moderate mitral regurgitation but no significant valvular pathology, August 1, 2022    Subclinical hypothyroid---cont  Synthroid 0.05 mg daily     Lower extremity edema shortness of breath, ,  stable September 2021 --cont -Lasix 40 mg daily when necessary    s/p L TKA, 4/16,     chronic A. 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 status post lumpectomy guided removal December 3, 2014 with 4 sentinel lymph nodes removed all negative, currently on anastrozole per oncology/ [facility] and stable    HTN-, continues HYDRALAZINE 50 TID AND LOSARTAN 100/25 QD , DOXAZOSIN 4 MG QHS, COREG 12.5 BID, Cardizem  mg daily    B/L SHOULDER AND L KNEE OA--     GOUT- BETTER --continue ALLOPURINOL 300 mg daily    OBESE-MORBID    DM 2, HGA1C=, 6.8, July 11, 2022---   cont Lantus 60 units daily, Januvia 50 mg daily,, Glucovance 2.5\500 mg twice a day,, patient states blood sugars running too low with 60 units of Lantus, decreased to 40 units daily    EYES CHECK BLOOM / BENNET --MARCH 2021    ELEVATED LFTS, BETTER --ALT , AST , BOTH NL NOW, May 2019, September 2019 ,JAN 2020, In July 2020, normal, November 2020, September 2021    VIT D DEF continues on replacement September 2021    ELEVATED CHOL--patient intolerant of statins discussed,    INSOMNIA, ON OTC BENADRYL     CKD 3 - CREAT Up to 1.8, , to 1.9, stable over the past 1-2 years, as of April 2021,                       Follow Up   Return in about  4 months (around 12/3/2022).  Patient was given instructions and counseling regarding her condition or for health maintenance advice. Please see specific information pulled into the AVS if appropriate.

## 2022-09-12 ENCOUNTER — OFFICE VISIT (OUTPATIENT)
Dept: PULMONOLOGY | Facility: CLINIC | Age: 82
End: 2022-09-12

## 2022-09-12 VITALS
RESPIRATION RATE: 18 BRPM | OXYGEN SATURATION: 96 % | HEIGHT: 65 IN | BODY MASS INDEX: 37.32 KG/M2 | TEMPERATURE: 97.7 F | WEIGHT: 224 LBS | HEART RATE: 86 BPM | SYSTOLIC BLOOD PRESSURE: 127 MMHG | DIASTOLIC BLOOD PRESSURE: 57 MMHG

## 2022-09-12 DIAGNOSIS — J18.9 PNEUMONIA OF LEFT UPPER LOBE DUE TO INFECTIOUS ORGANISM: Primary | ICD-10-CM

## 2022-09-12 DIAGNOSIS — J41.8 MIXED SIMPLE AND MUCOPURULENT CHRONIC BRONCHITIS: ICD-10-CM

## 2022-09-12 PROCEDURE — 99203 OFFICE O/P NEW LOW 30 MIN: CPT | Performed by: INTERNAL MEDICINE

## 2022-09-12 RX ORDER — ALBUTEROL SULFATE 90 UG/1
2 AEROSOL, METERED RESPIRATORY (INHALATION) EVERY 4 HOURS PRN
Qty: 1 G | Refills: 2 | Status: SHIPPED | OUTPATIENT
Start: 2022-09-12 | End: 2023-02-14 | Stop reason: SDUPTHER

## 2022-09-12 NOTE — PROGRESS NOTES
Pulmonary Consultation    Shay Ly,*,    Thank you for asking me to see Luzma Dick for   Chief Complaint   Patient presents with   • Cough   • Pneumonia   .      History of Present Illness  Luzma Dick is a 82 y.o. female with a PMH significant for CHF atrial fibrillation and diabetes mellitus presents for evaluation patient has been diagnosed with pneumonia and was noted to have persistent infiltrate at the left upper lobe patient presently denies any fever chills weight loss or hemoptysis she denies any hoarseness of voice  Patient does complain of wheezing especially on exertion over the past couple of years  Patient does not smoke but was exposed to secondhand smoke for over a period of 30 to 35 years       Tobacco use history:  Never smoker      Review of Systems: History obtained from chart review and the patient.  Review of Systems   Respiratory: Positive for shortness of breath and wheezing.    All other systems reviewed and are negative.    As described in the HPI. Otherwise, remainder of ROS (14 systems) were negative.    Patient Active Problem List   Diagnosis   • Malignant neoplasm of upper-outer quadrant of right female breast (HCC)   • FH: breast cancer   • Hypertension, essential   • Morbid (severe) obesity due to excess calories (HCC)   • Iron deficiency anemia   • Diabetes 1.5, managed as type 2 (HCC)   • Stage 3a chronic kidney disease (HCC)   • Acute left-sided low back pain without sciatica   • Elevated liver enzymes   • Vitamin D deficiency   • Longstanding persistent atrial fibrillation (HCC)   • Hypothyroidism due to Hashimoto's thyroiditis   • Primary insomnia   • Screening mammogram for breast cancer   • Type 2 diabetes mellitus without complication, with long-term current use of insulin (HCC)   • Breast cancer (HCC)   • Arthritis   • Anxiety   • Pruritus   • Dermatitis   • Perforation of left tympanic membrane   • Persistent dry cough   • Pneumonia of left lower  lobe due to infectious organism         Current Outpatient Medications:   •  allopurinol (ZYLOPRIM) 300 MG tablet, TAKE 1 TABLET BY MOUTH EVERY DAY, Disp: 30 tablet, Rfl: 3  •  apixaban (ELIQUIS) 5 MG tablet tablet, Take 1 tablet by mouth 2 (Two) Times a Day., Disp: 180 tablet, Rfl: 3  •  brompheniramine-pseudoephedrine-DM 30-2-10 MG/5ML syrup, Take 2.5 mL by mouth 4 (Four) Times a Day As Needed for Cough., Disp: 118 mL, Rfl: 0  •  carvedilol (COREG) 12.5 MG tablet, Take 1 tablet by mouth 2 (Two) Times a Day With Meals., Disp: 180 tablet, Rfl: 1  •  Cholecalciferol (VITAMIN D) 2000 UNITS capsule, Take 1,000 Units by mouth., Disp: , Rfl:   •  dilTIAZem (TIAZAC) 180 MG 24 hr capsule, Take 1 capsule by mouth Daily., Disp: 90 capsule, Rfl: 3  •  doxazosin (CARDURA) 8 MG tablet, Take 1 tablet by mouth Every Night., Disp: 90 tablet, Rfl: 3  •  escitalopram (Lexapro) 5 MG tablet, Take 1 tablet by mouth Daily., Disp: 30 tablet, Rfl: 5  •  famotidine (Pepcid) 20 MG tablet, Take 1 tablet by mouth 2 (Two) Times a Day., Disp: 60 tablet, Rfl: 5  •  fluticasone (Flonase) 50 MCG/ACT nasal spray, 2 sprays into the nostril(s) as directed by provider Daily., Disp: 16 g, Rfl: 1  •  glucose blood test strip, 1 each by Other route As Needed (as needed). Use as instructed, Disp: 100 each, Rfl: 1  •  glyBURIDE-metFORMIN (GLUCOVANCE) 2.5-500 MG per tablet, Take 1 tablet by mouth 2 (Two) Times a Day With Meals., Disp: 180 tablet, Rfl: 1  •  hydrALAZINE (APRESOLINE) 25 MG tablet, Take 1 tablet by mouth 3 (Three) Times a Day., Disp: 270 tablet, Rfl: 2  •  hydrOXYzine (ATARAX) 10 MG tablet, Take 1 tablet by mouth 3 (Three) Times a Day As Needed for Itching., Disp: 60 tablet, Rfl: 2  •  insulin glargine (Lantus) 100 UNIT/ML injection, Inject 60 Units under the skin into the appropriate area as directed Every Night., Disp: 30 mL, Rfl: 5  •  ipratropium-albuterol (DUO-NEB) 0.5-2.5 mg/3 ml nebulizer, Take 3 mL by nebulization Every 4 (Four) Hours  As Needed for Wheezing., Disp: 360 mL, Rfl: 3  •  Januvia 50 MG tablet, Take 1 tablet by mouth Daily., Disp: 90 tablet, Rfl: 3  •  levothyroxine (SYNTHROID, LEVOTHROID) 50 MCG tablet, Take 1 tablet by mouth Every Morning., Disp: 90 tablet, Rfl: 3  •  losartan-hydrochlorothiazide (Hyzaar) 100-25 MG per tablet, Take 1 tablet by mouth Daily., Disp: 90 tablet, Rfl: 1  •  triamcinolone (KENALOG) 0.5 % cream, Apply 1 application topically to the appropriate area as directed 2 (Two) Times a Day., Disp: 454 g, Rfl: 0  •  zolpidem (AMBIEN) 5 MG tablet, TAKE 1 TABLET BY MOUTH EVERY NIGHT AT BEDTIME AS NEEDED FOR SLEEP, Disp: 90 tablet, Rfl: 1  •  albuterol sulfate HFA (Ventolin HFA) 108 (90 Base) MCG/ACT inhaler, Inhale 2 puffs Every 4 (Four) Hours As Needed for Wheezing or Shortness of Air., Disp: 1 g, Rfl: 2    No Known Allergies    Past Medical History:   Diagnosis Date   • A-fib (HCC)    • Acute gout    • Anxiety    • Bilateral shoulder pain    • Depression    • Essential (primary) hypertension    • High cholesterol    • Insomnia, unspecified    • Kidney mass    • Kidney stones    • Mixed hyperlipidemia    • Obesity    • Pulmonary nodule    • TIA (transient ischemic attack)    • Type 2 diabetes mellitus without complication (HCC)     SINCE AGE 60-SUGARS -140   • Urge incontinence    • Urinary incontinence    • Vitamin D deficiency, unspecified      Past Surgical History:   Procedure Laterality Date   • BREAST BIOPSY Left 10/2014   • BREAST LUMPECTOMY  2014   • KNEE ARTHROPLASTY Right 2008   • OTHER SURGICAL HISTORY Right     EAR SURGERY   • TOTAL KNEE ARTHROPLASTY Left 03/2016   • URETERAL STENT INSERTION      Stent placement for kidney stones     Social History     Socioeconomic History   • Marital status:    Tobacco Use   • Smoking status: Never Smoker   • Smokeless tobacco: Never Used   Vaping Use   • Vaping Use: Never used   Substance and Sexual Activity   • Alcohol use: No   • Drug use: No   • Sexual  "activity: Defer     Family History   Problem Relation Age of Onset   • Breast cancer Sister 59          Objective     Blood pressure 127/57, pulse 86, temperature 97.7 °F (36.5 °C), resp. rate 18, height 165.1 cm (65\"), weight 102 kg (224 lb), SpO2 96 %.  Physical Exam  Vitals and nursing note reviewed.   Constitutional:       Appearance: She is obese.   HENT:      Head: Normocephalic and atraumatic.      Nose: Nose normal.      Mouth/Throat:      Mouth: Mucous membranes are moist.      Pharynx: Oropharynx is clear.   Eyes:      Extraocular Movements: Extraocular movements intact.      Conjunctiva/sclera: Conjunctivae normal.      Pupils: Pupils are equal, round, and reactive to light.   Cardiovascular:      Rate and Rhythm: Normal rate. Rhythm irregular.      Pulses: Normal pulses.      Heart sounds: Normal heart sounds.   Pulmonary:      Effort: Pulmonary effort is normal.      Breath sounds: Wheezing and rhonchi present.   Abdominal:      General: Abdomen is flat. Bowel sounds are normal.      Palpations: Abdomen is soft.   Musculoskeletal:         General: Normal range of motion.      Cervical back: Normal range of motion and neck supple.   Skin:     General: Skin is warm.      Capillary Refill: Capillary refill takes less than 2 seconds.   Neurological:      General: No focal deficit present.      Mental Status: She is alert and oriented to person, place, and time.   Psychiatric:         Mood and Affect: Mood normal.         Behavior: Behavior normal.       Immunization History   Administered Date(s) Administered   • COVID-19 (MODERNA) 1st, 2nd, 3rd Dose Only 02/27/2021, 03/30/2021, 12/03/2021   • DTaP 10/31/2017   • Fluzone High Dose =>65 Years ([employer] ONLY) 10/13/2012, 09/24/2014, 09/03/2015, 12/06/2016   • Fluzone Quad >6mos (Multi-dose) 11/16/2018, 11/03/2020   • Influenza, Unspecified 10/13/2012, 09/24/2014, 09/03/2015, 12/06/2016, 11/03/2020   • PPD Test 04/01/2016, 04/08/2016            Assessment & " Plan     Diagnoses and all orders for this visit:    1. Pneumonia of left upper lobe due to infectious organism (Primary)  -     Cancel: CT Chest With Contrast; Future  -     Full Pulmonary Function Test With Bronchodilator; Future  -     CT Chest Without Contrast; Future    2. Mixed simple and mucopurulent chronic bronchitis (HCC)  -     Cancel: CT Chest With Contrast; Future  -     Full Pulmonary Function Test With Bronchodilator; Future  -     CT Chest Without Contrast; Future    Other orders  -     albuterol sulfate HFA (Ventolin HFA) 108 (90 Base) MCG/ACT inhaler; Inhale 2 puffs Every 4 (Four) Hours As Needed for Wheezing or Shortness of Air.  Dispense: 1 g; Refill: 2         Discussion/ Recommendations:   We will order CT scan of the chest on account of persistent left upper lobe opacity  I have discussed with the patient regarding possibility of bronchoscopy if her infiltrate persists  Patient is advised albuterol inhaler 2 puffs every 6 hours as needed  We will order PFTs for evaluation  Patient is advised to reduce weight  Patient is advised flu vaccine  Discussed vaccination and recommended    Class 2 Severe Obesity (BMI >=35 and <=39.9). Obesity-related health conditions include the following: hypertension. Obesity is unchanged. BMI is is above average; BMI management plan is completed. We discussed low calorie, low carb based diet program, portion control and increasing exercise.           Return in about 2 weeks (around 9/26/2022).      Thank you for allowing me to participate in the care of Luzma Dick. Please do not hesitate to contact me with any questions.         This document has been electronically signed by Ventura Hammond MD on September 12, 2022 15:43 EDT     PAIN SCALE 6 OF 10.

## 2022-10-17 RX ORDER — GLYBURIDE-METFORMIN HYDROCHLORIDE 2.5; 5 MG/1; MG/1
1 TABLET ORAL 2 TIMES DAILY WITH MEALS
Qty: 180 TABLET | Refills: 1 | Status: SHIPPED | OUTPATIENT
Start: 2022-10-17 | End: 2023-04-04 | Stop reason: SDUPTHER

## 2022-10-17 RX ORDER — LOSARTAN POTASSIUM AND HYDROCHLOROTHIAZIDE 25; 100 MG/1; MG/1
1 TABLET ORAL DAILY
Qty: 90 TABLET | Refills: 1 | Status: SHIPPED | OUTPATIENT
Start: 2022-10-17 | End: 2023-04-04 | Stop reason: SDUPTHER

## 2022-10-17 RX ORDER — HYDRALAZINE HYDROCHLORIDE 25 MG/1
25 TABLET, FILM COATED ORAL 3 TIMES DAILY
Qty: 270 TABLET | Refills: 2 | Status: SHIPPED | OUTPATIENT
Start: 2022-10-17 | End: 2023-04-04 | Stop reason: SDUPTHER

## 2022-10-17 NOTE — TELEPHONE ENCOUNTER
Caller: Luzma Dick    Relationship: Self    Best call back number: [phone]    Requested Prescriptions:   Requested Prescriptions     Pending Prescriptions Disp Refills   • losartan-hydrochlorothiazide (Hyzaar) 100-25 MG per tablet 90 tablet 1     Sig: Take 1 tablet by mouth Daily.   • glyBURIDE-metFORMIN (GLUCOVANCE) 2.5-500 MG per tablet 180 tablet 1     Sig: Take 1 tablet by mouth 2 (Two) Times a Day With Meals.   • hydrALAZINE (APRESOLINE) 25 MG tablet 270 tablet 2     Sig: Take 1 tablet by mouth 3 (Three) Times a Day.            COLESTIPOL HCL         Pharmacy where request should be sent: [facility] [place] [facility] [place] - [address] - [phone] [facility] [phone] FX       Does the patient have less than a 3 day supply:  [x] Yes  [] No    Shelbi Bal Rep   10/17/22 12:12 EDT

## 2022-10-31 ENCOUNTER — HOSPITAL ENCOUNTER (OUTPATIENT)
Dept: RESPIRATORY THERAPY | Facility: HOSPITAL | Age: 82
Discharge: HOME | End: 2022-10-31

## 2022-10-31 ENCOUNTER — HOSPITAL ENCOUNTER (OUTPATIENT)
Dept: CT IMAGING | Facility: HOSPITAL | Age: 82
Discharge: HOME | End: 2022-10-31

## 2022-10-31 DIAGNOSIS — J18.9 PNEUMONIA OF LEFT UPPER LOBE DUE TO INFECTIOUS ORGANISM: ICD-10-CM

## 2022-10-31 DIAGNOSIS — J41.8 MIXED SIMPLE AND MUCOPURULENT CHRONIC BRONCHITIS: ICD-10-CM

## 2022-10-31 PROCEDURE — 94060 EVALUATION OF WHEEZING: CPT | Performed by: INTERNAL MEDICINE

## 2022-10-31 PROCEDURE — 94726 PLETHYSMOGRAPHY LUNG VOLUMES: CPT

## 2022-10-31 PROCEDURE — 71250 CT THORAX DX C-: CPT

## 2022-10-31 PROCEDURE — 94060 EVALUATION OF WHEEZING: CPT

## 2022-10-31 PROCEDURE — 94726 PLETHYSMOGRAPHY LUNG VOLUMES: CPT | Performed by: INTERNAL MEDICINE

## 2022-10-31 PROCEDURE — 94729 DIFFUSING CAPACITY: CPT | Performed by: INTERNAL MEDICINE

## 2022-10-31 PROCEDURE — 94729 DIFFUSING CAPACITY: CPT

## 2022-10-31 RX ORDER — ALBUTEROL SULFATE 2.5 MG/3ML
2.5 SOLUTION RESPIRATORY (INHALATION) ONCE
Status: COMPLETED | OUTPATIENT
Start: 2022-10-31 | End: 2022-10-31

## 2022-10-31 RX ADMIN — ALBUTEROL SULFATE 2.5 MG: 2.5 SOLUTION RESPIRATORY (INHALATION) at 09:59

## 2022-11-17 ENCOUNTER — LAB (OUTPATIENT)
Dept: INTERNAL MEDICINE | Facility: CLINIC | Age: 82
End: 2022-11-17

## 2022-11-17 ENCOUNTER — OFFICE VISIT (OUTPATIENT)
Dept: PULMONOLOGY | Facility: CLINIC | Age: 82
End: 2022-11-17

## 2022-11-17 VITALS
OXYGEN SATURATION: 95 % | BODY MASS INDEX: 37.12 KG/M2 | RESPIRATION RATE: 18 BRPM | WEIGHT: 222.8 LBS | HEART RATE: 69 BPM | DIASTOLIC BLOOD PRESSURE: 49 MMHG | TEMPERATURE: 97.7 F | SYSTOLIC BLOOD PRESSURE: 101 MMHG | HEIGHT: 65 IN

## 2022-11-17 DIAGNOSIS — E03.8 HYPOTHYROIDISM DUE TO HASHIMOTO'S THYROIDITIS: ICD-10-CM

## 2022-11-17 DIAGNOSIS — E55.9 VITAMIN D DEFICIENCY: ICD-10-CM

## 2022-11-17 DIAGNOSIS — J41.8 MIXED SIMPLE AND MUCOPURULENT CHRONIC BRONCHITIS: Primary | ICD-10-CM

## 2022-11-17 DIAGNOSIS — N18.31 STAGE 3A CHRONIC KIDNEY DISEASE: ICD-10-CM

## 2022-11-17 DIAGNOSIS — F51.01 PRIMARY INSOMNIA: ICD-10-CM

## 2022-11-17 DIAGNOSIS — J44.1 COPD WITH ACUTE EXACERBATION: ICD-10-CM

## 2022-11-17 DIAGNOSIS — E11.9 TYPE 2 DIABETES MELLITUS WITHOUT COMPLICATION, WITH LONG-TERM CURRENT USE OF INSULIN: ICD-10-CM

## 2022-11-17 DIAGNOSIS — J18.9 PNEUMONIA OF LEFT UPPER LOBE DUE TO INFECTIOUS ORGANISM: ICD-10-CM

## 2022-11-17 DIAGNOSIS — D50.9 IRON DEFICIENCY ANEMIA, UNSPECIFIED IRON DEFICIENCY ANEMIA TYPE: ICD-10-CM

## 2022-11-17 DIAGNOSIS — R74.8 ELEVATED LIVER ENZYMES: ICD-10-CM

## 2022-11-17 DIAGNOSIS — I10 HYPERTENSION, ESSENTIAL: ICD-10-CM

## 2022-11-17 DIAGNOSIS — E06.3 HYPOTHYROIDISM DUE TO HASHIMOTO'S THYROIDITIS: ICD-10-CM

## 2022-11-17 DIAGNOSIS — R05.3 PERSISTENT DRY COUGH: ICD-10-CM

## 2022-11-17 DIAGNOSIS — E66.01 MORBID (SEVERE) OBESITY DUE TO EXCESS CALORIES: ICD-10-CM

## 2022-11-17 DIAGNOSIS — F41.9 ANXIETY: ICD-10-CM

## 2022-11-17 DIAGNOSIS — J18.9 PNEUMONIA OF LEFT LOWER LOBE DUE TO INFECTIOUS ORGANISM: ICD-10-CM

## 2022-11-17 DIAGNOSIS — Z79.4 TYPE 2 DIABETES MELLITUS WITHOUT COMPLICATION, WITH LONG-TERM CURRENT USE OF INSULIN: ICD-10-CM

## 2022-11-17 LAB
25(OH)D3 SERPL-MCNC: 41.9 NG/ML (ref 30–100)
ALBUMIN SERPL-MCNC: 3.8 G/DL (ref 3.5–5.2)
ALBUMIN/GLOB SERPL: 1.2 G/DL
ALP SERPL-CCNC: 96 U/L (ref 39–117)
ALT SERPL W P-5'-P-CCNC: 7 U/L (ref 1–33)
ANION GAP SERPL CALCULATED.3IONS-SCNC: 12 MMOL/L (ref 5–15)
AST SERPL-CCNC: 9 U/L (ref 1–32)
BASOPHILS # BLD AUTO: 0.02 10*3/MM3 (ref 0–0.2)
BASOPHILS NFR BLD AUTO: 0.2 % (ref 0–1.5)
BILIRUB SERPL-MCNC: 0.4 MG/DL (ref 0–1.2)
BUN SERPL-MCNC: 28 MG/DL (ref 8–23)
BUN/CREAT SERPL: 14.9 (ref 7–25)
CALCIUM SPEC-SCNC: 9.7 MG/DL (ref 8.6–10.5)
CHLORIDE SERPL-SCNC: 107 MMOL/L (ref 98–107)
CHOLEST SERPL-MCNC: 173 MG/DL (ref 0–200)
CO2 SERPL-SCNC: 22 MMOL/L (ref 22–29)
CREAT SERPL-MCNC: 1.88 MG/DL (ref 0.57–1)
DEPRECATED RDW RBC AUTO: 41.3 FL (ref 37–54)
EGFRCR SERPLBLD CKD-EPI 2021: 26.4 ML/MIN/1.73
EOSINOPHIL # BLD AUTO: 0.28 10*3/MM3 (ref 0–0.4)
EOSINOPHIL NFR BLD AUTO: 3 % (ref 0.3–6.2)
ERYTHROCYTE [DISTWIDTH] IN BLOOD BY AUTOMATED COUNT: 13.4 % (ref 12.3–15.4)
GLOBULIN UR ELPH-MCNC: 3.3 GM/DL
GLUCOSE SERPL-MCNC: 161 MG/DL (ref 65–99)
HBA1C MFR BLD: 6.7 % (ref 4.8–5.6)
HCT VFR BLD AUTO: 33.3 % (ref 34–46.6)
HDLC SERPL-MCNC: 39 MG/DL (ref 40–60)
HGB BLD-MCNC: 11.2 G/DL (ref 12–15.9)
IMM GRANULOCYTES # BLD AUTO: 0.1 10*3/MM3 (ref 0–0.05)
IMM GRANULOCYTES NFR BLD AUTO: 1.1 % (ref 0–0.5)
LDLC SERPL CALC-MCNC: 98 MG/DL (ref 0–100)
LDLC/HDLC SERPL: 2.36 {RATIO}
LYMPHOCYTES # BLD AUTO: 2.77 10*3/MM3 (ref 0.7–3.1)
LYMPHOCYTES NFR BLD AUTO: 29.3 % (ref 19.6–45.3)
MAGNESIUM SERPL-MCNC: 1.6 MG/DL (ref 1.6–2.4)
MCH RBC QN AUTO: 28.4 PG (ref 26.6–33)
MCHC RBC AUTO-ENTMCNC: 33.6 G/DL (ref 31.5–35.7)
MCV RBC AUTO: 84.3 FL (ref 79–97)
MONOCYTES # BLD AUTO: 0.65 10*3/MM3 (ref 0.1–0.9)
MONOCYTES NFR BLD AUTO: 6.9 % (ref 5–12)
NEUTROPHILS NFR BLD AUTO: 5.63 10*3/MM3 (ref 1.7–7)
NEUTROPHILS NFR BLD AUTO: 59.5 % (ref 42.7–76)
NRBC BLD AUTO-RTO: 0 /100 WBC (ref 0–0.2)
PLATELET # BLD AUTO: 219 10*3/MM3 (ref 140–450)
PMV BLD AUTO: 9.8 FL (ref 6–12)
POTASSIUM SERPL-SCNC: 4.5 MMOL/L (ref 3.5–5.2)
PROT SERPL-MCNC: 7.1 G/DL (ref 6–8.5)
RBC # BLD AUTO: 3.95 10*6/MM3 (ref 3.77–5.28)
SODIUM SERPL-SCNC: 141 MMOL/L (ref 136–145)
T4 FREE SERPL-MCNC: 1.36 NG/DL (ref 0.93–1.7)
TRIGL SERPL-MCNC: 209 MG/DL (ref 0–150)
TSH SERPL DL<=0.05 MIU/L-ACNC: 3.59 UIU/ML (ref 0.27–4.2)
VLDLC SERPL-MCNC: 36 MG/DL (ref 5–40)
WBC NRBC COR # BLD: 9.45 10*3/MM3 (ref 3.4–10.8)

## 2022-11-17 PROCEDURE — 84439 ASSAY OF FREE THYROXINE: CPT | Performed by: INTERNAL MEDICINE

## 2022-11-17 PROCEDURE — 36415 COLL VENOUS BLD VENIPUNCTURE: CPT | Performed by: INTERNAL MEDICINE

## 2022-11-17 PROCEDURE — 84443 ASSAY THYROID STIM HORMONE: CPT | Performed by: INTERNAL MEDICINE

## 2022-11-17 PROCEDURE — 80061 LIPID PANEL: CPT | Performed by: INTERNAL MEDICINE

## 2022-11-17 PROCEDURE — 99214 OFFICE O/P EST MOD 30 MIN: CPT | Performed by: INTERNAL MEDICINE

## 2022-11-17 PROCEDURE — 85025 COMPLETE CBC W/AUTO DIFF WBC: CPT | Performed by: INTERNAL MEDICINE

## 2022-11-17 PROCEDURE — 82306 VITAMIN D 25 HYDROXY: CPT | Performed by: INTERNAL MEDICINE

## 2022-11-17 PROCEDURE — 83735 ASSAY OF MAGNESIUM: CPT | Performed by: INTERNAL MEDICINE

## 2022-11-17 PROCEDURE — 83036 HEMOGLOBIN GLYCOSYLATED A1C: CPT | Performed by: INTERNAL MEDICINE

## 2022-11-17 PROCEDURE — 80053 COMPREHEN METABOLIC PANEL: CPT | Performed by: INTERNAL MEDICINE

## 2022-11-17 RX ORDER — AZITHROMYCIN 250 MG/1
TABLET, FILM COATED ORAL
Qty: 6 TABLET | Refills: 0 | Status: SHIPPED | OUTPATIENT
Start: 2022-11-17 | End: 2023-04-04

## 2022-11-17 RX ORDER — PREDNISONE 20 MG/1
40 TABLET ORAL DAILY
Qty: 14 TABLET | Refills: 0 | Status: SHIPPED | OUTPATIENT
Start: 2022-11-17 | End: 2023-04-04

## 2022-11-17 RX ORDER — DILTIAZEM HYDROCHLORIDE 180 MG/1
CAPSULE, COATED, EXTENDED RELEASE ORAL
COMMUNITY
Start: 2022-10-13 | End: 2023-04-04 | Stop reason: SDUPTHER

## 2022-11-17 NOTE — PROGRESS NOTES
Pulmonary Office Follow-up    Subjective     Luzma Dick is seen today at the office for   Chief Complaint   Patient presents with   • Shortness of Breath   • Cough     States she has a barking cough   • Wheezing   • Follow-up     Xray and PFT results         HPI  Luzma Dick is a 82 y.o. female with a PMH significant for chronic bronchitis with exposure to secondhand smoke and pneumonia left presents for follow-up patient has had a CT scan and PFTs done  Patient complains of severe cough with wheeze and shortness of breath and patient is having poor p.o. intake with nausea and poor appetite patient denies any chest pain fever or hemoptysis      Tobacco use history:  Never smoker      Patient Active Problem List   Diagnosis   • Malignant neoplasm of upper-outer quadrant of right female breast (HCC)   • FH: breast cancer   • Hypertension, essential   • Morbid (severe) obesity due to excess calories (HCC)   • Iron deficiency anemia   • Diabetes 1.5, managed as type 2 (HCC)   • Stage 3a chronic kidney disease (HCC)   • Acute left-sided low back pain without sciatica   • Elevated liver enzymes   • Vitamin D deficiency   • Longstanding persistent atrial fibrillation (HCC)   • Hypothyroidism due to Hashimoto's thyroiditis   • Primary insomnia   • Screening mammogram for breast cancer   • Type 2 diabetes mellitus without complication, with long-term current use of insulin (HCC)   • Breast cancer (HCC)   • Arthritis   • Anxiety   • Pruritus   • Dermatitis   • Perforation of left tympanic membrane   • Persistent dry cough   • Pneumonia of left lower lobe due to infectious organism       Review of Systems  Review of Systems   Respiratory: Positive for cough, shortness of breath and wheezing.    All other systems reviewed and are negative.    As described in the HPI. Otherwise, remainder of ROS (14 systems) were negative.    Medications, Allergies, Social, and Family Histories reviewed as per EMR.    Objective      Vitals:    11/17/22 0901   BP: 101/49   Pulse: 69   Resp: 18   Temp: 97.7 °F (36.5 °C)   SpO2: 95%         11/17/22 0901   Weight: 101 kg (222 lb 12.8 oz)       Physical Exam  Vitals and nursing note reviewed.   Constitutional:       Appearance: She is obese.   HENT:      Head: Normocephalic and atraumatic.      Nose: Nose normal.      Mouth/Throat:      Mouth: Mucous membranes are moist.      Pharynx: Oropharynx is clear.   Eyes:      Pupils: Pupils are equal, round, and reactive to light.   Cardiovascular:      Rate and Rhythm: Normal rate and regular rhythm.      Pulses: Normal pulses.      Heart sounds: Normal heart sounds.   Pulmonary:      Effort: Pulmonary effort is normal.      Breath sounds: Wheezing and rhonchi present.   Abdominal:      General: Abdomen is flat. Bowel sounds are normal.      Palpations: Abdomen is soft.   Musculoskeletal:         General: Normal range of motion.      Cervical back: Normal range of motion and neck supple.   Skin:     General: Skin is warm.      Capillary Refill: Capillary refill takes 2 to 3 seconds.   Neurological:      General: No focal deficit present.      Mental Status: She is alert and oriented to person, place, and time.   Psychiatric:         Mood and Affect: Mood normal.         CT Chest Without Contrast Diagnostic    Result Date: 10/31/2022    1. Interval improvement airspace consolidation within the left upper lobe.  There is residual predominately linear consolidation with air bronchograms favored to be due to post infectious or inflammatory scarring. 2. Cholelithiasis     ABY JEAN BAPTISTE MD       Electronically Signed and Approved By: ABY JEAN BAPTISTE MD on 10/31/2022 at 14:22                Assessment & Plan     Diagnoses and all orders for this visit:    1. Mixed simple and mucopurulent chronic bronchitis (HCC) (Primary)    2. Pneumonia of left upper lobe due to infectious organism    3. COPD with acute exacerbation (HCC)    Other orders  -      Fluticasone-Umeclidin-Vilant (TRELEGY) 100-62.5-25 MCG/ACT inhaler; Inhale 1 puff Daily.  Dispense: 1 each; Refill: 11  -     azithromycin (ZITHROMAX) 250 MG tablet; Take 2 by mouth today then 1 daily for 4 days  Dispense: 6 tablet; Refill: 0  -     predniSONE (DELTASONE) 20 MG tablet; Take 2 tablets by mouth Daily.  Dispense: 14 tablet; Refill: 0         Discussion/ Recommendations:   Patient is advised to continue albuterol inhaler as needed  Trelegy once daily  Will start her on Zithromax along with prednisone for 1 week  Advised to reduce weight and regular exercise  PFTs reviewed showing evidence of moderately severe airways obstruction with reduction in diffusion capacity  Advised flu shot  Vaccinations discussed and recommended    Class 2 Severe Obesity (BMI >=35 and <=39.9). Obesity-related health conditions include the following: hypertension. Obesity is unchanged. BMI is is above average; BMI management plan is completed. We discussed low calorie, low carb based diet program, portion control and increasing exercise.        Return in about 3 months (around 2/17/2023).          This document has been electronically signed by Ventura Hammond MD on November 17, 2022 09:12 EST

## 2022-12-01 ENCOUNTER — TELEPHONE (OUTPATIENT)
Dept: INTERNAL MEDICINE | Facility: CLINIC | Age: 82
End: 2022-12-01

## 2022-12-01 RX ORDER — ALLOPURINOL 300 MG/1
300 TABLET ORAL DAILY
Qty: 90 TABLET | Refills: 3 | Status: SHIPPED | OUTPATIENT
Start: 2022-12-01 | End: 2023-04-04 | Stop reason: SDUPTHER

## 2022-12-01 NOTE — TELEPHONE ENCOUNTER
Caller: Luzma Dick    Relationship: Self    Best call back number: [phone]    Requested Prescriptions:   Requested Prescriptions     Pending Prescriptions Disp Refills   • allopurinol (ZYLOPRIM) 300 MG tablet 30 tablet 3     Sig: Take 1 tablet by mouth Daily.        Pharmacy where request should be sent: [id number] [place] [facility] [address] AT [facility] [phone] [facility] [phone] FX     Additional details provided by patient: PATIENT IS COMPLETELY OUT OF THIS MEDICATION    Does the patient have less than a 3 day supply:  [x] Yes  [] No    Would you like a call back once the refill request has been completed: [x] Yes [] No    If the office needs to give you a call back, can they leave a voicemail: [] Yes [x] No    Shelbi Duff Rep   12/01/22 09:27 EST

## 2022-12-05 ENCOUNTER — OFFICE VISIT (OUTPATIENT)
Dept: INTERNAL MEDICINE | Facility: CLINIC | Age: 82
End: 2022-12-05

## 2022-12-05 VITALS
TEMPERATURE: 97.4 F | SYSTOLIC BLOOD PRESSURE: 120 MMHG | HEART RATE: 78 BPM | HEIGHT: 65 IN | OXYGEN SATURATION: 97 % | DIASTOLIC BLOOD PRESSURE: 78 MMHG | BODY MASS INDEX: 37.09 KG/M2 | WEIGHT: 222.6 LBS

## 2022-12-05 DIAGNOSIS — E66.01 MORBID (SEVERE) OBESITY DUE TO EXCESS CALORIES: ICD-10-CM

## 2022-12-05 DIAGNOSIS — N18.31 STAGE 3A CHRONIC KIDNEY DISEASE: ICD-10-CM

## 2022-12-05 DIAGNOSIS — F41.9 ANXIETY: ICD-10-CM

## 2022-12-05 DIAGNOSIS — E06.3 HYPOTHYROIDISM DUE TO HASHIMOTO'S THYROIDITIS: ICD-10-CM

## 2022-12-05 DIAGNOSIS — Z79.4 TYPE 2 DIABETES MELLITUS WITHOUT COMPLICATION, WITH LONG-TERM CURRENT USE OF INSULIN: ICD-10-CM

## 2022-12-05 DIAGNOSIS — E11.9 TYPE 2 DIABETES MELLITUS WITHOUT COMPLICATION, WITH LONG-TERM CURRENT USE OF INSULIN: ICD-10-CM

## 2022-12-05 DIAGNOSIS — Z23 NEED FOR VACCINATION: ICD-10-CM

## 2022-12-05 DIAGNOSIS — F51.01 PRIMARY INSOMNIA: ICD-10-CM

## 2022-12-05 DIAGNOSIS — E55.9 VITAMIN D DEFICIENCY: ICD-10-CM

## 2022-12-05 DIAGNOSIS — Z12.31 SCREENING MAMMOGRAM FOR BREAST CANCER: ICD-10-CM

## 2022-12-05 DIAGNOSIS — D50.9 IRON DEFICIENCY ANEMIA, UNSPECIFIED IRON DEFICIENCY ANEMIA TYPE: ICD-10-CM

## 2022-12-05 DIAGNOSIS — R74.8 ELEVATED LIVER ENZYMES: ICD-10-CM

## 2022-12-05 DIAGNOSIS — C50.919 MALIGNANT NEOPLASM OF FEMALE BREAST, UNSPECIFIED ESTROGEN RECEPTOR STATUS, UNSPECIFIED LATERALITY, UNSPECIFIED SITE OF BREAST: ICD-10-CM

## 2022-12-05 DIAGNOSIS — I48.11 LONGSTANDING PERSISTENT ATRIAL FIBRILLATION: Primary | ICD-10-CM

## 2022-12-05 DIAGNOSIS — E03.8 HYPOTHYROIDISM DUE TO HASHIMOTO'S THYROIDITIS: ICD-10-CM

## 2022-12-05 DIAGNOSIS — C50.411 MALIGNANT NEOPLASM OF UPPER-OUTER QUADRANT OF RIGHT FEMALE BREAST, UNSPECIFIED ESTROGEN RECEPTOR STATUS: ICD-10-CM

## 2022-12-05 DIAGNOSIS — I10 HYPERTENSION, ESSENTIAL: ICD-10-CM

## 2022-12-05 PROCEDURE — G0009 ADMIN PNEUMOCOCCAL VACCINE: HCPCS | Performed by: INTERNAL MEDICINE

## 2022-12-05 PROCEDURE — 99214 OFFICE O/P EST MOD 30 MIN: CPT | Performed by: INTERNAL MEDICINE

## 2022-12-05 PROCEDURE — G0008 ADMIN INFLUENZA VIRUS VAC: HCPCS | Performed by: INTERNAL MEDICINE

## 2022-12-05 PROCEDURE — 90662 IIV NO PRSV INCREASED AG IM: CPT | Performed by: INTERNAL MEDICINE

## 2022-12-05 PROCEDURE — 90677 PCV20 VACCINE IM: CPT | Performed by: INTERNAL MEDICINE

## 2022-12-05 RX ORDER — VENLAFAXINE HYDROCHLORIDE 37.5 MG/1
37.5 CAPSULE, EXTENDED RELEASE ORAL DAILY
Qty: 30 CAPSULE | Refills: 5 | Status: SHIPPED | OUTPATIENT
Start: 2022-12-05 | End: 2023-04-04

## 2022-12-05 NOTE — PROGRESS NOTES
"CHIEF COMPLAINT:  Follow-up (81 y/o female is here today to follow up on labs. Pt wants medication refill on all her medication but she wants them deliver. Pt uses [facility] )      HPI: Patient is here to follow-up with diabetes stress blood pressure etc., says she is doing well needs refills of medications so they can be delivered        Objective   Vital Signs  Vitals:    12/05/22 1415   BP: 120/78   BP Location: Right arm   Patient Position: Sitting   Cuff Size: Adult   Pulse: 78   Temp: 97.4 °F (36.3 °C)   TempSrc: Temporal   SpO2: 97%   Weight: 101 kg (222 lb 9.6 oz)   Height: 165.1 cm (65\")      Body mass index is 37.04 kg/m².  Review of Systems   Constitutional: Negative.    HENT: Negative.    Eyes: Negative.    Respiratory: Negative.    Cardiovascular: Negative.    Gastrointestinal: Negative.    Endocrine: Negative.    Genitourinary: Negative.    Musculoskeletal: Negative.    Allergic/Immunologic: Negative.    Neurological: Negative.    Hematological: Negative.    Psychiatric/Behavioral: Negative.       Physical Exam  Constitutional:       General: She is not in acute distress.     Appearance: Normal appearance. She is obese.   HENT:      Head: Normocephalic.      Mouth/Throat:      Mouth: Mucous membranes are moist.   Eyes:      Conjunctiva/sclera: Conjunctivae normal.      Pupils: Pupils are equal, round, and reactive to light.   Cardiovascular:      Rate and Rhythm: Normal rate and regular rhythm.      Pulses: Normal pulses.      Heart sounds: Normal heart sounds.   Pulmonary:      Effort: Pulmonary effort is normal.      Breath sounds: Normal breath sounds.   Abdominal:      General: Bowel sounds are normal.      Palpations: Abdomen is soft.   Musculoskeletal:         General: No swelling. Normal range of motion.      Cervical back: Neck supple.   Skin:     General: Skin is warm and dry.      Coloration: Skin is not jaundiced.   Neurological:      General: No focal deficit present.      Mental Status: " She is alert and oriented to person, place, and time. Mental status is at baseline.   Psychiatric:         Mood and Affect: Mood normal.         Behavior: Behavior normal.         Thought Content: Thought content normal.         Judgment: Judgment normal.        Result Review :   Lab Results   Component Value Date    PROBNP 823.1 09/13/2021     01/09/2020     CMP    CMP 7/4/22 7/11/22 11/17/22   Glucose 244 (A) 151 (A) 161 (A)   BUN 40 (A) 29 (A) 28 (A)   Creatinine 1.83 (A) 1.48 (A) 1.88 (A)   Sodium 138 135 (A) 141   Potassium 4.7 4.3 4.5   Chloride 105 102 107   Calcium 10.3 10.5 9.7   Albumin 2.80 (A) 3.00 (A) 3.80   Total Bilirubin 0.3 0.3 0.4   Alkaline Phosphatase 129 (A) 102 96   AST (SGOT) 15 16 9   ALT (SGPT) 20 13 7   (A) Abnormal value            CBC w/diff    CBC w/Diff 7/4/22 7/11/22 11/17/22   WBC 13.62 (A) 12.68 (A) 9.45   RBC 3.44 (A) 3.44 (A) 3.95   Hemoglobin 9.6 (A) 9.5 (A) 11.2 (A)   Hematocrit 29.1 (A) 28.4 (A) 33.3 (A)   MCV 84.6 82.6 84.3   MCH 27.9 27.6 28.4   MCHC 33.0 33.5 33.6   RDW 14.7 14.1 13.4   Platelets 350 400 219   Neutrophil Rel % 75.4 70.9 59.5   Immature Granulocyte Rel % 1.6 (A) 1.7 (A) 1.1 (A)   Lymphocyte Rel % 15.2 (A) 18.9 (A) 29.3   Monocyte Rel % 6.4 6.8 6.9   Eosinophil Rel % 1.2 1.3 3.0   Basophil Rel % 0.2 0.4 0.2   (A) Abnormal value             Lipid Panel    Lipid Panel 7/11/22 11/17/22   Total Cholesterol 130 173   Triglycerides 163 (A) 209 (A)   HDL Cholesterol 34 (A) 39 (A)   VLDL Cholesterol 28 36   LDL Cholesterol  68 98   LDL/HDL Ratio 1.86 2.36   (A) Abnormal value             Lab Results   Component Value Date    TSH 3.590 11/17/2022    TSH 2.450 09/13/2021    TSH 2.960 04/12/2021      Lab Results   Component Value Date    FREET4 1.36 11/17/2022    FREET4 1.25 09/13/2021      A1C Last 3 Results    HGBA1C Last 3 Results 3/11/22 7/11/22 11/17/22   Hemoglobin A1C 6.70 (A) 6.80 (A) 6.70 (A)   (A) Abnormal value                              Visit  Diagnoses:    ICD-10-CM ICD-9-CM   1. Longstanding persistent atrial fibrillation (HCC)  I48.11 427.31   2. Iron deficiency anemia, unspecified iron deficiency anemia type  D50.9 280.9   3. Vitamin D deficiency  E55.9 268.9   4. Anxiety  F41.9 300.00   5. Stage 3a chronic kidney disease (HCC)  N18.31 585.3   6. Morbid (severe) obesity due to excess calories (HCC)  E66.01 278.01   7. Primary insomnia  F51.01 307.42   8. Type 2 diabetes mellitus without complication, with long-term current use of insulin (HCC)  E11.9 250.00    Z79.4 V58.67   9. Malignant neoplasm of upper-outer quadrant of right female breast, unspecified estrogen receptor status (HCC)  C50.411 174.4   10. Hypertension, essential  I10 401.9   11. Hypothyroidism due to Hashimoto's thyroiditis  E03.8 244.8    E06.3 245.2   12. Elevated liver enzymes  R74.8 790.5   13. Screening mammogram for breast cancer  Z12.31 V76.12   14. Malignant neoplasm of female breast, unspecified estrogen receptor status, unspecified laterality, unspecified site of breast (HCC)  C50.919 174.9       Assessment and Plan   Diagnoses and all orders for this visit:    1. Longstanding persistent atrial fibrillation (HCC) (Primary)  -     CBC & Differential; Future  -     Comprehensive Metabolic Panel; Future  -     Hemoglobin A1c; Future  -     Iron Profile; Future  -     Ferritin; Future    2. Iron deficiency anemia, unspecified iron deficiency anemia type  -     CBC & Differential; Future  -     Comprehensive Metabolic Panel; Future  -     Hemoglobin A1c; Future  -     Iron Profile; Future  -     Ferritin; Future    3. Vitamin D deficiency  -     CBC & Differential; Future  -     Comprehensive Metabolic Panel; Future  -     Hemoglobin A1c; Future  -     Iron Profile; Future  -     Ferritin; Future    4. Anxiety  -     CBC & Differential; Future  -     Comprehensive Metabolic Panel; Future  -     Hemoglobin A1c; Future  -     Iron Profile; Future  -     Ferritin; Future    5.  Stage 3a chronic kidney disease (HCC)  -     CBC & Differential; Future  -     Comprehensive Metabolic Panel; Future  -     Hemoglobin A1c; Future  -     Iron Profile; Future  -     Ferritin; Future    6. Morbid (severe) obesity due to excess calories (HCC)  -     CBC & Differential; Future  -     Comprehensive Metabolic Panel; Future  -     Hemoglobin A1c; Future  -     Iron Profile; Future  -     Ferritin; Future    7. Primary insomnia  -     CBC & Differential; Future  -     Comprehensive Metabolic Panel; Future  -     Hemoglobin A1c; Future  -     Iron Profile; Future  -     Ferritin; Future    8. Type 2 diabetes mellitus without complication, with long-term current use of insulin (HCC)  -     CBC & Differential; Future  -     Comprehensive Metabolic Panel; Future  -     Hemoglobin A1c; Future  -     Iron Profile; Future  -     Ferritin; Future    9. Malignant neoplasm of upper-outer quadrant of right female breast, unspecified estrogen receptor status (HCC)  -     CBC & Differential; Future  -     Comprehensive Metabolic Panel; Future  -     Hemoglobin A1c; Future  -     Iron Profile; Future  -     Ferritin; Future    10. Hypertension, essential  -     CBC & Differential; Future  -     Comprehensive Metabolic Panel; Future  -     Hemoglobin A1c; Future  -     Iron Profile; Future  -     Ferritin; Future    11. Hypothyroidism due to Hashimoto's thyroiditis  -     CBC & Differential; Future  -     Comprehensive Metabolic Panel; Future  -     Hemoglobin A1c; Future  -     Iron Profile; Future  -     Ferritin; Future    12. Elevated liver enzymes  -     CBC & Differential; Future  -     Comprehensive Metabolic Panel; Future  -     Hemoglobin A1c; Future  -     Iron Profile; Future  -     Ferritin; Future    13. Screening mammogram for breast cancer  -     CBC & Differential; Future  -     Comprehensive Metabolic Panel; Future  -     Hemoglobin A1c; Future  -     Iron Profile; Future  -     Ferritin; Future    14.  Malignant neoplasm of female breast, unspecified estrogen receptor status, unspecified laterality, unspecified site of breast (HCC)  -     CBC & Differential; Future  -     Comprehensive Metabolic Panel; Future  -     Hemoglobin A1c; Future  -     Iron Profile; Future  -     Ferritin; Future    Other orders  -     venlafaxine XR (Effexor XR) 37.5 MG 24 hr capsule; Take 1 capsule by mouth Daily.  Dispense: 30 capsule; Refill: 5        Pneumonia left lower lobe July 4, 2022,-- CT scan, August 1, 2022 of the chest, shows persistent consolidation left upper lobe with air bronchograms likely representing pneumonia tree-in-bud nodularity and opacity in the more superior upper lobe is also present, patient did see pulmonology Dr. Velásquez had a CT scan October 31, 2022 showing interval improvement in airspace consolidation within the left upper lobe some residual predominant linear consolidation and air bronchograms favored to represent postinfectious or inflammatory scarring and cholelithiasis, patient is feeling better overall    Iron deficiency anemia,, hemoglobin 9.5 hematocrit 28.4 July 11, 2022 iron levels low at 27 with a 10% saturation, ----had colonoscopy Dr. Lopez 2019 multiple polyps, patient received iron infusions July 2022 feeling some better, had previously had iron infusions in 2019, hemoglobin up to 11.2 hematocrit 33, November 17, 2022 patient continues iron supplements over-the-counter    Increased stress with home life stressors at home with family situation, patient stopped Lexapro previously    renal cyst versus mass, ultrasound of the kidneys November 2020 showed simple renal cysts no further workup,    Heart murmur,----- echocardiogram shows only trace mitral regurgitation and normal ejection fraction October 2020,, repeat echo shows low normal ejection fraction 50% to 55% no regional wall motion abnormalities there was some diastolic dysfunction borderline left atrial enlargement mild to  moderate mitral regurgitation but no significant valvular pathology, August 1, 2022    Subclinical hypothyroid---cont  Synthroid 0.05 mg daily     Lower extremity edema shortness of breath,-cont -Lasix 40 mg daily when necessary    s/p L TKA, 4/16,     chronic A. 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 status post lumpectomy guided removal December 3, 2014 with 4 sentinel lymph nodes removed all negative, currently off anastrozole per oncology/ [facility] and stable since  Dec 2020    HTN-, continues HYDRALAZINE 50 TID AND LOSARTAN 100/25 QD , DOXAZOSIN 4 MG QHS, COREG 12.5 BID, Cardizem  mg daily    B/L SHOULDER AND L KNEE OA--     GOUT- BETTER --continue ALLOPURINOL 300 mg daily    OBESE-MORBID    DM 2, HGA1C=, 6.7 November 2022   cont Lantus 40 units daily, Januvia 50 mg daily,, Glucovance 2.5\500 mg twice a day,,    EYES CHECK BLOOM / BENNET --MARCH 2021    ELEVATED LFTS, BETTER --ALT , AST , BOTH NL NOW, May 2019, September 2019 ,JAN 2020, In July 2020, normal, November 2020, September 2021    VIT D DEF continues on replacement September 2021    ELEVATED CHOL--patient intolerant of statins discussed,    INSOMNIA, ON OTC BENADRYL     CKD 3 - CREAT Up to 1.8, , to 1.9, stable over the past 1-2 years, as of April 2021,, and November 2022      Follow Up   Return in about 4 months (around 4/5/2023).  Patient was given instructions and counseling regarding her condition or for health maintenance advice. Please see specific information pulled into the AVS if appropriate.

## 2023-01-04 ENCOUNTER — TELEPHONE (OUTPATIENT)
Dept: INTERNAL MEDICINE | Facility: CLINIC | Age: 83
End: 2023-01-04
Payer: MEDICARE

## 2023-01-04 DIAGNOSIS — F51.01 PRIMARY INSOMNIA: ICD-10-CM

## 2023-01-04 RX ORDER — LEVOTHYROXINE SODIUM 0.05 MG/1
50 TABLET ORAL
Qty: 90 TABLET | Refills: 3 | Status: SHIPPED | OUTPATIENT
Start: 2023-01-04 | End: 2023-04-04 | Stop reason: SDUPTHER

## 2023-01-04 RX ORDER — ZOLPIDEM TARTRATE 5 MG/1
5 TABLET ORAL NIGHTLY PRN
Qty: 90 TABLET | Refills: 1 | Status: SHIPPED | OUTPATIENT
Start: 2023-01-04 | End: 2023-04-04 | Stop reason: SDUPTHER

## 2023-01-04 RX ORDER — CARVEDILOL 12.5 MG/1
12.5 TABLET ORAL 2 TIMES DAILY WITH MEALS
Qty: 180 TABLET | Refills: 1 | Status: SHIPPED | OUTPATIENT
Start: 2023-01-04 | End: 2023-04-04 | Stop reason: SDUPTHER

## 2023-01-04 NOTE — TELEPHONE ENCOUNTER
Caller: Luzma Dick    Relationship: Self    Best call back number: [phone]    Requested Prescriptions:   Requested Prescriptions     Pending Prescriptions Disp Refills   • zolpidem (AMBIEN) 5 MG tablet 90 tablet 1     Sig: Take 1 tablet by mouth At Night As Needed. for sleep        Pharmacy where request should be sent: [id number] - [place], [place] - [address]  [address] - [phone]  - [phone] FX     Additional details provided by patient: PATIENT HAS THREE PILLS LEFT. PLEASE SEND NEW PRESCRIPTION TO THE PHARMACY LISTED ASAP.    Does the patient have less than a 3 day supply:  [] Yes  [x] No    Would you like a call back once the refill request has been completed: [] Yes [] No    If the office needs to give you a call back, can they leave a voicemail: [] Yes [] No    Shelbi Weiss Rep   01/04/23 15:32 EST

## 2023-01-04 NOTE — TELEPHONE ENCOUNTER
Caller: Luzma Dick    Relationship: Self    Best call back number: [phone]    Requested Prescriptions:   Requested Prescriptions     Pending Prescriptions Disp Refills   • carvedilol (COREG) 12.5 MG tablet 180 tablet 1     Sig: Take 1 tablet by mouth 2 (Two) Times a Day With Meals.   • levothyroxine (SYNTHROID, LEVOTHROID) 50 MCG tablet 90 tablet 3     Sig: Take 1 tablet by mouth Every Morning.    COLESTIPOL HCL 1MG TABLET    Pharmacy where request should be sent: [facility] [place] [place]  [address] [phone] [facility] [phone]      Additional details provided by patient:PATIENT IS COMPLETELY OUT OF THESE MEDICATIONS. PLEASE SEND NEW PRESCRIPTIONS INTO THE LISTED PHARMACY ASAP TODAY.    Does the patient have less than a 3 day supply:  [x] Yes  [] No    Would you like a call back once the refill request has been completed: [] Yes [] No    If the office needs to give you a call back, can they leave a voicemail: [] Yes [] No    Shelbi Weiss Rep   01/04/23 15:35 EST

## 2023-02-09 RX ORDER — SITAGLIPTIN 50 MG/1
50 TABLET, FILM COATED ORAL DAILY
Qty: 90 TABLET | Refills: 3 | Status: SHIPPED | OUTPATIENT
Start: 2023-02-09 | End: 2023-04-04 | Stop reason: SDUPTHER

## 2023-02-09 NOTE — TELEPHONE ENCOUNTER
Caller: Luzma Dick    Relationship: Self    Best call back number: [phone]    Requested Prescriptions:   Requested Prescriptions     Pending Prescriptions Disp Refills   • Januvia 50 MG tablet 90 tablet 3     Sig: Take 1 tablet by mouth Daily.        Pharmacy where request should be sent: [id number] - [place], [place] - [address]  AT [facility] [address] - [phone]  - [phone] FX     Additional details provided by patient: PATIENT HAS BEEN OUT OF THIS FOR A FEW DAYS NOW    Does the patient have less than a 3 day supply:  [x] Yes  [] No    Would you like a call back once the refill request has been completed: [x] Yes [] No    If the office needs to give you a call back, can they leave a voicemail: [] Yes [x] No    Shelbi Glover Rep   02/09/23 11:56 EST

## 2023-02-14 ENCOUNTER — OFFICE VISIT (OUTPATIENT)
Dept: PULMONOLOGY | Facility: CLINIC | Age: 83
End: 2023-02-14
Payer: MEDICARE

## 2023-02-14 VITALS
SYSTOLIC BLOOD PRESSURE: 128 MMHG | TEMPERATURE: 97.8 F | DIASTOLIC BLOOD PRESSURE: 78 MMHG | BODY MASS INDEX: 38.49 KG/M2 | RESPIRATION RATE: 18 BRPM | HEIGHT: 65 IN | OXYGEN SATURATION: 94 % | WEIGHT: 231 LBS | HEART RATE: 61 BPM

## 2023-02-14 DIAGNOSIS — E66.01 MORBID (SEVERE) OBESITY DUE TO EXCESS CALORIES: ICD-10-CM

## 2023-02-14 DIAGNOSIS — J41.8 MIXED SIMPLE AND MUCOPURULENT CHRONIC BRONCHITIS: Primary | ICD-10-CM

## 2023-02-14 DIAGNOSIS — I48.11 LONGSTANDING PERSISTENT ATRIAL FIBRILLATION: ICD-10-CM

## 2023-02-14 PROCEDURE — 99214 OFFICE O/P EST MOD 30 MIN: CPT | Performed by: INTERNAL MEDICINE

## 2023-02-14 RX ORDER — ALBUTEROL SULFATE 90 UG/1
2 AEROSOL, METERED RESPIRATORY (INHALATION) EVERY 4 HOURS PRN
Qty: 1 G | Refills: 2 | Status: SHIPPED | OUTPATIENT
Start: 2023-02-14 | End: 2023-04-04 | Stop reason: SDUPTHER

## 2023-02-14 NOTE — PROGRESS NOTES
Pulmonary Office Follow-up    Subjective     Luzma Dick is seen today at the office for   Chief Complaint   Patient presents with   • Follow-up     3 Month follow up         HPI  Luzma Dick is a 82 y.o. female with a PMH significant for atrial fibrillation COPD and hypertension with chronic renal insufficiency presents for follow-up with shortness of breath, patient has been doing well on her current medications and denies any significant cough or wheezing she denies any calf pain or swelling there is no history of weight loss      Tobacco use history:  Never smoker      Patient Active Problem List   Diagnosis   • Malignant neoplasm of upper-outer quadrant of right female breast (HCC)   • FH: breast cancer   • Hypertension, essential   • Morbid (severe) obesity due to excess calories (HCC)   • Iron deficiency anemia   • Diabetes 1.5, managed as type 2 (HCC)   • Stage 3a chronic kidney disease (HCC)   • Acute left-sided low back pain without sciatica   • Elevated liver enzymes   • Vitamin D deficiency   • Longstanding persistent atrial fibrillation (HCC)   • Hypothyroidism due to Hashimoto's thyroiditis   • Primary insomnia   • Screening mammogram for breast cancer   • Type 2 diabetes mellitus without complication, with long-term current use of insulin (HCC)   • Breast cancer (HCC)   • Arthritis   • Anxiety   • Pruritus   • Dermatitis   • Perforation of left tympanic membrane   • Persistent dry cough   • Pneumonia of left lower lobe due to infectious organism       Review of Systems  Review of Systems   Respiratory: Positive for shortness of breath and wheezing.    All other systems reviewed and are negative.    As described in the HPI. Otherwise, remainder of ROS (14 systems) were negative.    Medications, Allergies, Social, and Family Histories reviewed as per EMR.    Objective     Vitals:    02/14/23 1341   BP: 128/78   Pulse: 61   Resp: 18   Temp: 97.8 °F (36.6 °C)   SpO2: 94%         02/14/23  1341    Weight: 105 kg (231 lb)       Physical Exam  Vitals and nursing note reviewed.   Constitutional:       Appearance: She is obese.   HENT:      Head: Normocephalic and atraumatic.      Nose: Nose normal.      Mouth/Throat:      Mouth: Mucous membranes are moist.   Eyes:      Conjunctiva/sclera: Conjunctivae normal.      Pupils: Pupils are equal, round, and reactive to light.   Cardiovascular:      Rate and Rhythm: Normal rate and regular rhythm.      Pulses: Normal pulses.      Heart sounds: Normal heart sounds.   Pulmonary:      Effort: Pulmonary effort is normal.      Breath sounds: Rhonchi present.   Abdominal:      General: Abdomen is flat. Bowel sounds are normal.      Palpations: Abdomen is soft.   Musculoskeletal:         General: Normal range of motion.      Cervical back: Normal range of motion and neck supple.   Skin:     General: Skin is warm.      Capillary Refill: Capillary refill takes 2 to 3 seconds.   Neurological:      General: No focal deficit present.      Mental Status: She is alert and oriented to person, place, and time.   Psychiatric:         Mood and Affect: Mood normal.         Behavior: Behavior normal.         No radiology results for the last 90 days.     Assessment & Plan     Diagnoses and all orders for this visit:    1. Mixed simple and mucopurulent chronic bronchitis (HCC) (Primary)    2. Morbid (severe) obesity due to excess calories (HCC)    3. Longstanding persistent atrial fibrillation (HCC)    Other orders  -     albuterol sulfate HFA (Ventolin HFA) 108 (90 Base) MCG/ACT inhaler; Inhale 2 puffs Every 4 (Four) Hours As Needed for Wheezing or Shortness of Air.  Dispense: 1 g; Refill: 2  -     Fluticasone-Umeclidin-Vilant (TRELEGY) 100-62.5-25 MCG/ACT inhaler; Inhale 1 puff Daily.  Dispense: 1 each; Refill: 11         Discussion/ Recommendations:   Patient is advised to reduce weight her BMI is 38.44  Patient is advised to continue regular exercise within her limits  Refill  albuterol inhaler as needed  Trelegy once daily  Vaccinations discussed and recommended    Class 2 Severe Obesity (BMI >=35 and <=39.9). Obesity-related health conditions include the following: obstructive sleep apnea and hypertension. Obesity is unchanged. BMI is is above average; BMI management plan is completed. We discussed low calorie, low carb based diet program, portion control and increasing exercise.        Return in about 3 months (around 5/14/2023).          This document has been electronically signed by Ventura Hammond MD on February 14, 2023 13:47 EST

## 2023-04-03 ENCOUNTER — LAB (OUTPATIENT)
Dept: INTERNAL MEDICINE | Facility: CLINIC | Age: 83
End: 2023-04-03
Payer: MEDICARE

## 2023-04-03 DIAGNOSIS — E66.01 MORBID (SEVERE) OBESITY DUE TO EXCESS CALORIES: ICD-10-CM

## 2023-04-03 DIAGNOSIS — E03.8 HYPOTHYROIDISM DUE TO HASHIMOTO'S THYROIDITIS: ICD-10-CM

## 2023-04-03 DIAGNOSIS — I48.11 LONGSTANDING PERSISTENT ATRIAL FIBRILLATION: ICD-10-CM

## 2023-04-03 DIAGNOSIS — Z79.4 TYPE 2 DIABETES MELLITUS WITHOUT COMPLICATION, WITH LONG-TERM CURRENT USE OF INSULIN: ICD-10-CM

## 2023-04-03 DIAGNOSIS — E11.9 TYPE 2 DIABETES MELLITUS WITHOUT COMPLICATION, WITH LONG-TERM CURRENT USE OF INSULIN: ICD-10-CM

## 2023-04-03 DIAGNOSIS — E55.9 VITAMIN D DEFICIENCY: ICD-10-CM

## 2023-04-03 DIAGNOSIS — F41.9 ANXIETY: ICD-10-CM

## 2023-04-03 DIAGNOSIS — R74.8 ELEVATED LIVER ENZYMES: ICD-10-CM

## 2023-04-03 DIAGNOSIS — D50.9 IRON DEFICIENCY ANEMIA, UNSPECIFIED IRON DEFICIENCY ANEMIA TYPE: ICD-10-CM

## 2023-04-03 DIAGNOSIS — Z12.31 SCREENING MAMMOGRAM FOR BREAST CANCER: ICD-10-CM

## 2023-04-03 DIAGNOSIS — E06.3 HYPOTHYROIDISM DUE TO HASHIMOTO'S THYROIDITIS: ICD-10-CM

## 2023-04-03 DIAGNOSIS — C50.919 MALIGNANT NEOPLASM OF FEMALE BREAST, UNSPECIFIED ESTROGEN RECEPTOR STATUS, UNSPECIFIED LATERALITY, UNSPECIFIED SITE OF BREAST: ICD-10-CM

## 2023-04-03 DIAGNOSIS — F51.01 PRIMARY INSOMNIA: ICD-10-CM

## 2023-04-03 DIAGNOSIS — I10 HYPERTENSION, ESSENTIAL: ICD-10-CM

## 2023-04-03 DIAGNOSIS — C50.411 MALIGNANT NEOPLASM OF UPPER-OUTER QUADRANT OF RIGHT FEMALE BREAST, UNSPECIFIED ESTROGEN RECEPTOR STATUS: ICD-10-CM

## 2023-04-03 DIAGNOSIS — N18.31 STAGE 3A CHRONIC KIDNEY DISEASE: ICD-10-CM

## 2023-04-03 LAB
ALBUMIN SERPL-MCNC: 3.9 G/DL (ref 3.5–5.2)
ALBUMIN/GLOB SERPL: 1.2 G/DL
ALP SERPL-CCNC: 105 U/L (ref 39–117)
ALT SERPL W P-5'-P-CCNC: 6 U/L (ref 1–33)
ANION GAP SERPL CALCULATED.3IONS-SCNC: 11.1 MMOL/L (ref 5–15)
AST SERPL-CCNC: 13 U/L (ref 1–32)
BASOPHILS # BLD AUTO: 0.03 10*3/MM3 (ref 0–0.2)
BASOPHILS NFR BLD AUTO: 0.4 % (ref 0–1.5)
BILIRUB SERPL-MCNC: 0.2 MG/DL (ref 0–1.2)
BUN SERPL-MCNC: 52 MG/DL (ref 8–23)
BUN/CREAT SERPL: 20 (ref 7–25)
CALCIUM SPEC-SCNC: 10.6 MG/DL (ref 8.6–10.5)
CHLORIDE SERPL-SCNC: 111 MMOL/L (ref 98–107)
CO2 SERPL-SCNC: 17.9 MMOL/L (ref 22–29)
CREAT SERPL-MCNC: 2.6 MG/DL (ref 0.57–1)
DEPRECATED RDW RBC AUTO: 46.3 FL (ref 37–54)
EGFRCR SERPLBLD CKD-EPI 2021: 17.9 ML/MIN/1.73
EOSINOPHIL # BLD AUTO: 0.28 10*3/MM3 (ref 0–0.4)
EOSINOPHIL NFR BLD AUTO: 3.6 % (ref 0.3–6.2)
ERYTHROCYTE [DISTWIDTH] IN BLOOD BY AUTOMATED COUNT: 14.8 % (ref 12.3–15.4)
FERRITIN SERPL-MCNC: 89.2 NG/ML (ref 13–150)
GLOBULIN UR ELPH-MCNC: 3.2 GM/DL
GLUCOSE SERPL-MCNC: 149 MG/DL (ref 65–99)
HBA1C MFR BLD: 6.5 % (ref 4.8–5.6)
HCT VFR BLD AUTO: 33.5 % (ref 34–46.6)
HGB BLD-MCNC: 11.1 G/DL (ref 12–15.9)
IMM GRANULOCYTES # BLD AUTO: 0.09 10*3/MM3 (ref 0–0.05)
IMM GRANULOCYTES NFR BLD AUTO: 1.2 % (ref 0–0.5)
IRON 24H UR-MRATE: 50 MCG/DL (ref 37–145)
IRON SATN MFR SERPL: 14 % (ref 20–50)
LYMPHOCYTES # BLD AUTO: 2.88 10*3/MM3 (ref 0.7–3.1)
LYMPHOCYTES NFR BLD AUTO: 37.3 % (ref 19.6–45.3)
MCH RBC QN AUTO: 28.7 PG (ref 26.6–33)
MCHC RBC AUTO-ENTMCNC: 33.1 G/DL (ref 31.5–35.7)
MCV RBC AUTO: 86.6 FL (ref 79–97)
MONOCYTES # BLD AUTO: 0.46 10*3/MM3 (ref 0.1–0.9)
MONOCYTES NFR BLD AUTO: 6 % (ref 5–12)
NEUTROPHILS NFR BLD AUTO: 3.98 10*3/MM3 (ref 1.7–7)
NEUTROPHILS NFR BLD AUTO: 51.5 % (ref 42.7–76)
NRBC BLD AUTO-RTO: 0 /100 WBC (ref 0–0.2)
PLATELET # BLD AUTO: 217 10*3/MM3 (ref 140–450)
PMV BLD AUTO: 9.7 FL (ref 6–12)
POTASSIUM SERPL-SCNC: 4.3 MMOL/L (ref 3.5–5.2)
PROT SERPL-MCNC: 7.1 G/DL (ref 6–8.5)
RBC # BLD AUTO: 3.87 10*6/MM3 (ref 3.77–5.28)
SODIUM SERPL-SCNC: 140 MMOL/L (ref 136–145)
TIBC SERPL-MCNC: 349 MCG/DL (ref 298–536)
TRANSFERRIN SERPL-MCNC: 234 MG/DL (ref 200–360)
WBC NRBC COR # BLD: 7.72 10*3/MM3 (ref 3.4–10.8)

## 2023-04-03 PROCEDURE — 85025 COMPLETE CBC W/AUTO DIFF WBC: CPT | Performed by: INTERNAL MEDICINE

## 2023-04-03 PROCEDURE — 84466 ASSAY OF TRANSFERRIN: CPT | Performed by: INTERNAL MEDICINE

## 2023-04-03 PROCEDURE — 36415 COLL VENOUS BLD VENIPUNCTURE: CPT | Performed by: INTERNAL MEDICINE

## 2023-04-03 PROCEDURE — 80053 COMPREHEN METABOLIC PANEL: CPT | Performed by: INTERNAL MEDICINE

## 2023-04-03 PROCEDURE — 83036 HEMOGLOBIN GLYCOSYLATED A1C: CPT | Performed by: INTERNAL MEDICINE

## 2023-04-03 PROCEDURE — 82728 ASSAY OF FERRITIN: CPT | Performed by: INTERNAL MEDICINE

## 2023-04-03 PROCEDURE — 83540 ASSAY OF IRON: CPT | Performed by: INTERNAL MEDICINE

## 2023-04-04 ENCOUNTER — OFFICE VISIT (OUTPATIENT)
Dept: INTERNAL MEDICINE | Facility: CLINIC | Age: 83
End: 2023-04-04
Payer: MEDICARE

## 2023-04-04 VITALS
OXYGEN SATURATION: 94 % | SYSTOLIC BLOOD PRESSURE: 122 MMHG | WEIGHT: 221.8 LBS | HEART RATE: 87 BPM | DIASTOLIC BLOOD PRESSURE: 63 MMHG | HEIGHT: 65 IN | BODY MASS INDEX: 36.96 KG/M2 | TEMPERATURE: 96.6 F

## 2023-04-04 DIAGNOSIS — F51.01 PRIMARY INSOMNIA: ICD-10-CM

## 2023-04-04 DIAGNOSIS — E03.8 HYPOTHYROIDISM DUE TO HASHIMOTO'S THYROIDITIS: ICD-10-CM

## 2023-04-04 DIAGNOSIS — D50.9 IRON DEFICIENCY ANEMIA, UNSPECIFIED IRON DEFICIENCY ANEMIA TYPE: ICD-10-CM

## 2023-04-04 DIAGNOSIS — E66.01 MORBID (SEVERE) OBESITY DUE TO EXCESS CALORIES: ICD-10-CM

## 2023-04-04 DIAGNOSIS — R74.8 ELEVATED LIVER ENZYMES: ICD-10-CM

## 2023-04-04 DIAGNOSIS — E55.9 VITAMIN D DEFICIENCY: ICD-10-CM

## 2023-04-04 DIAGNOSIS — C50.919 MALIGNANT NEOPLASM OF FEMALE BREAST, UNSPECIFIED ESTROGEN RECEPTOR STATUS, UNSPECIFIED LATERALITY, UNSPECIFIED SITE OF BREAST: ICD-10-CM

## 2023-04-04 DIAGNOSIS — N18.31 STAGE 3A CHRONIC KIDNEY DISEASE: ICD-10-CM

## 2023-04-04 DIAGNOSIS — E06.3 HYPOTHYROIDISM DUE TO HASHIMOTO'S THYROIDITIS: ICD-10-CM

## 2023-04-04 DIAGNOSIS — F41.9 ANXIETY: ICD-10-CM

## 2023-04-04 DIAGNOSIS — I48.11 LONGSTANDING PERSISTENT ATRIAL FIBRILLATION: ICD-10-CM

## 2023-04-04 DIAGNOSIS — E11.9 TYPE 2 DIABETES MELLITUS WITHOUT COMPLICATION, WITH LONG-TERM CURRENT USE OF INSULIN: ICD-10-CM

## 2023-04-04 DIAGNOSIS — I10 HYPERTENSION, ESSENTIAL: ICD-10-CM

## 2023-04-04 DIAGNOSIS — Z00.00 MEDICARE ANNUAL WELLNESS VISIT, SUBSEQUENT: Primary | ICD-10-CM

## 2023-04-04 DIAGNOSIS — Z79.4 TYPE 2 DIABETES MELLITUS WITHOUT COMPLICATION, WITH LONG-TERM CURRENT USE OF INSULIN: ICD-10-CM

## 2023-04-04 RX ORDER — INSULIN GLARGINE 100 [IU]/ML
60 INJECTION, SOLUTION SUBCUTANEOUS NIGHTLY
Qty: 30 ML | Refills: 5 | Status: SHIPPED | OUTPATIENT
Start: 2023-04-04 | End: 2023-04-05 | Stop reason: SDUPTHER

## 2023-04-04 RX ORDER — CARVEDILOL 12.5 MG/1
12.5 TABLET ORAL 2 TIMES DAILY WITH MEALS
Qty: 180 TABLET | Refills: 3 | Status: SHIPPED | OUTPATIENT
Start: 2023-04-04 | End: 2023-04-05 | Stop reason: SDUPTHER

## 2023-04-04 RX ORDER — GLYBURIDE-METFORMIN HYDROCHLORIDE 2.5; 5 MG/1; MG/1
1 TABLET ORAL 2 TIMES DAILY WITH MEALS
Qty: 180 TABLET | Refills: 3 | Status: SHIPPED | OUTPATIENT
Start: 2023-04-04 | End: 2023-04-05 | Stop reason: SDUPTHER

## 2023-04-04 RX ORDER — MULTIVIT-MIN/IRON/FOLIC ACID/K 18-600-40
2000 CAPSULE ORAL DAILY
Qty: 90 CAPSULE | Refills: 3 | Status: SHIPPED | OUTPATIENT
Start: 2023-04-04 | End: 2023-04-05 | Stop reason: SDUPTHER

## 2023-04-04 RX ORDER — ALLOPURINOL 300 MG/1
300 TABLET ORAL DAILY
Qty: 90 TABLET | Refills: 3 | Status: SHIPPED | OUTPATIENT
Start: 2023-04-04 | End: 2023-04-05 | Stop reason: SDUPTHER

## 2023-04-04 RX ORDER — MONTELUKAST SODIUM 4 MG/1
1 TABLET, CHEWABLE ORAL 2 TIMES DAILY
Qty: 180 TABLET | Refills: 3 | Status: SHIPPED | OUTPATIENT
Start: 2023-04-04 | End: 2023-04-05 | Stop reason: SDUPTHER

## 2023-04-04 RX ORDER — ZOLPIDEM TARTRATE 5 MG/1
5 TABLET ORAL NIGHTLY PRN
Qty: 90 TABLET | Refills: 1 | Status: SHIPPED | OUTPATIENT
Start: 2023-04-04 | End: 2023-04-05 | Stop reason: SDUPTHER

## 2023-04-04 RX ORDER — ESCITALOPRAM OXALATE 5 MG/1
5 TABLET ORAL DAILY
Qty: 90 TABLET | Refills: 3 | Status: SHIPPED | OUTPATIENT
Start: 2023-04-04 | End: 2023-04-05 | Stop reason: SDUPTHER

## 2023-04-04 RX ORDER — ALBUTEROL SULFATE 90 UG/1
2 AEROSOL, METERED RESPIRATORY (INHALATION) EVERY 4 HOURS PRN
Qty: 8.5 G | Refills: 5 | Status: SHIPPED | OUTPATIENT
Start: 2023-04-04 | End: 2023-04-05 | Stop reason: SDUPTHER

## 2023-04-04 RX ORDER — LEVOTHYROXINE SODIUM 0.05 MG/1
50 TABLET ORAL
Qty: 90 TABLET | Refills: 3 | Status: SHIPPED | OUTPATIENT
Start: 2023-04-04 | End: 2023-04-05 | Stop reason: SDUPTHER

## 2023-04-04 RX ORDER — HYDRALAZINE HYDROCHLORIDE 25 MG/1
25 TABLET, FILM COATED ORAL 3 TIMES DAILY
Qty: 270 TABLET | Refills: 3 | Status: SHIPPED | OUTPATIENT
Start: 2023-04-04 | End: 2023-04-05 | Stop reason: SDUPTHER

## 2023-04-04 RX ORDER — DILTIAZEM HYDROCHLORIDE 180 MG/1
180 CAPSULE, EXTENDED RELEASE ORAL DAILY
Qty: 90 CAPSULE | Refills: 3 | Status: SHIPPED | OUTPATIENT
Start: 2023-04-04 | End: 2023-04-05 | Stop reason: SDUPTHER

## 2023-04-04 RX ORDER — LOSARTAN POTASSIUM AND HYDROCHLOROTHIAZIDE 25; 100 MG/1; MG/1
1 TABLET ORAL DAILY
Qty: 90 TABLET | Refills: 3 | Status: SHIPPED | OUTPATIENT
Start: 2023-04-04 | End: 2023-04-05 | Stop reason: SDUPTHER

## 2023-04-04 RX ORDER — DOXAZOSIN 8 MG/1
8 TABLET ORAL NIGHTLY
Qty: 90 TABLET | Refills: 3 | Status: SHIPPED | OUTPATIENT
Start: 2023-04-04 | End: 2023-04-05 | Stop reason: SDUPTHER

## 2023-04-04 RX ORDER — SITAGLIPTIN 50 MG/1
50 TABLET, FILM COATED ORAL DAILY
Qty: 90 TABLET | Refills: 3 | Status: SHIPPED | OUTPATIENT
Start: 2023-04-04 | End: 2023-04-05 | Stop reason: SDUPTHER

## 2023-04-04 NOTE — PROGRESS NOTES
"CHIEF COMPLAINT/ HPI:  Medicare Wellness-subsequent    Patient is here to follow-up with diabetes, blood pressure, stress,    Lung doctor put her on Trelegy    Patient needs refills of lots of medications today she is here to follow-up with her anxiety also,      Objective   Vital Signs  Vitals:    04/04/23 1451   BP: 122/63   Pulse: 87   Temp: 96.6 °F (35.9 °C)   SpO2: 94%   Weight: 101 kg (221 lb 12.8 oz)   Height: 165.1 cm (65\")      Body mass index is 36.91 kg/m².  Review of Systems   Constitutional: Negative.    HENT: Negative.    Eyes: Negative.    Respiratory: Negative.    Cardiovascular: Negative.    Gastrointestinal: Negative.    Endocrine: Negative.    Genitourinary: Negative.    Musculoskeletal: Negative.    Allergic/Immunologic: Negative.    Neurological: Negative.    Hematological: Negative.    Psychiatric/Behavioral: Negative.       Physical Exam  Constitutional:       General: She is not in acute distress.     Appearance: Normal appearance. She is obese.   HENT:      Head: Normocephalic.      Mouth/Throat:      Mouth: Mucous membranes are moist.   Eyes:      Conjunctiva/sclera: Conjunctivae normal.      Pupils: Pupils are equal, round, and reactive to light.   Cardiovascular:      Rate and Rhythm: Normal rate and regular rhythm.      Pulses: Normal pulses.      Heart sounds: Normal heart sounds.   Pulmonary:      Effort: Pulmonary effort is normal.      Breath sounds: Normal breath sounds.   Abdominal:      General: Bowel sounds are normal.      Palpations: Abdomen is soft.   Musculoskeletal:         General: No swelling. Normal range of motion.      Cervical back: Neck supple.      Right lower leg: Edema (1+) present.      Left lower leg: Edema (1+) present.   Skin:     General: Skin is warm and dry.      Coloration: Skin is not jaundiced.   Neurological:      General: No focal deficit present.      Mental Status: She is alert and oriented to person, place, and time. Mental status is at baseline. "   Psychiatric:         Mood and Affect: Mood normal.         Behavior: Behavior normal.         Thought Content: Thought content normal.         Judgment: Judgment normal.        Result Review :   Lab Results   Component Value Date    PROBNP 823.1 09/13/2021     01/09/2020     CMP    CMP 7/11/22 11/17/22 4/3/23   Glucose 151 (A) 161 (A) 149 (A)   BUN 29 (A) 28 (A) 52 (A)   Creatinine 1.48 (A) 1.88 (A) 2.60 (A)   eGFR 35.2 (A) 26.4 (A) 17.9 (A)   Sodium 135 (A) 141 140   Potassium 4.3 4.5 4.3   Chloride 102 107 111 (A)   Calcium 10.5 9.7 10.6 (A)   Total Protein 6.6 7.1 7.1   Albumin 3.00 (A) 3.80 3.9   Globulin 3.6 3.3 3.2   Total Bilirubin 0.3 0.4 0.2   Alkaline Phosphatase 102 96 105   AST (SGOT) 16 9 13   ALT (SGPT) 13 7 6   Albumin/Globulin Ratio 0.8 1.2 1.2   BUN/Creatinine Ratio 19.6 14.9 20.0   Anion Gap 13.8 12.0 11.1   (A) Abnormal value       Comments are available for some flowsheets but are not being displayed.           CBC w/diff    CBC w/Diff 7/11/22 11/17/22 4/3/23   WBC 12.68 (A) 9.45 7.72   RBC 3.44 (A) 3.95 3.87   Hemoglobin 9.5 (A) 11.2 (A) 11.1 (A)   Hematocrit 28.4 (A) 33.3 (A) 33.5 (A)   MCV 82.6 84.3 86.6   MCH 27.6 28.4 28.7   MCHC 33.5 33.6 33.1   RDW 14.1 13.4 14.8   Platelets 400 219 217   Neutrophil Rel % 70.9 59.5 51.5   Immature Granulocyte Rel % 1.7 (A) 1.1 (A) 1.2 (A)   Lymphocyte Rel % 18.9 (A) 29.3 37.3   Monocyte Rel % 6.8 6.9 6.0   Eosinophil Rel % 1.3 3.0 3.6   Basophil Rel % 0.4 0.2 0.4   (A) Abnormal value             Lipid Panel    Lipid Panel 7/11/22 11/17/22   Total Cholesterol 130 173   Triglycerides 163 (A) 209 (A)   HDL Cholesterol 34 (A) 39 (A)   VLDL Cholesterol 28 36   LDL Cholesterol  68 98   LDL/HDL Ratio 1.86 2.36   (A) Abnormal value             Lab Results   Component Value Date    TSH 3.590 11/17/2022    TSH 2.450 09/13/2021    TSH 2.960 04/12/2021      Lab Results   Component Value Date    FREET4 1.36 11/17/2022    FREET4 1.25 09/13/2021      A1C Last 3  Results    HGBA1C Last 3 Results 7/11/22 11/17/22 4/3/23   Hemoglobin A1C 6.80 (A) 6.70 (A) 6.50 (A)   (A) Abnormal value                              Visit Diagnoses:    ICD-10-CM ICD-9-CM   1. Medicare annual wellness visit, subsequent  Z00.00 V70.0   2. Longstanding persistent atrial fibrillation  I48.11 427.31   3. Hypertension, essential  I10 401.9   4. Morbid (severe) obesity due to excess calories  E66.01 278.01   5. Stage 3a chronic kidney disease  N18.31 585.3   6. Vitamin D deficiency  E55.9 268.9   7. Primary insomnia  F51.01 307.42   8. Malignant neoplasm of female breast, unspecified estrogen receptor status, unspecified laterality, unspecified site of breast  C50.919 174.9   9. Anxiety  F41.9 300.00   10. Type 2 diabetes mellitus without complication, with long-term current use of insulin  E11.9 250.00    Z79.4 V58.67   11. Hypothyroidism due to Hashimoto's thyroiditis  E03.8 244.8    E06.3 245.2   12. Elevated liver enzymes  R74.8 790.5   13. Iron deficiency anemia, unspecified iron deficiency anemia type  D50.9 280.9       Assessment and Plan   Diagnoses and all orders for this visit:    1. Medicare annual wellness visit, subsequent (Primary)  -     Microalbumin / Creatinine Urine Ratio - Urine, Clean Catch; Future  -     Comprehensive Metabolic Panel; Future  -     CBC & Differential; Future  -     Ferritin; Future  -     Iron Profile; Future  -     Hemoglobin A1c; Future  -     Lipid Panel; Future  -     Uric Acid; Future  -     TSH+Free T4; Future    2. Longstanding persistent atrial fibrillation  -     Microalbumin / Creatinine Urine Ratio - Urine, Clean Catch; Future  -     Comprehensive Metabolic Panel; Future  -     CBC & Differential; Future  -     Ferritin; Future  -     Iron Profile; Future  -     Hemoglobin A1c; Future  -     Lipid Panel; Future  -     Uric Acid; Future  -     TSH+Free T4; Future    3. Hypertension, essential  -     Microalbumin / Creatinine Urine Ratio - Urine, Clean  Catch; Future  -     Comprehensive Metabolic Panel; Future  -     CBC & Differential; Future  -     Ferritin; Future  -     Iron Profile; Future  -     Hemoglobin A1c; Future  -     Lipid Panel; Future  -     Uric Acid; Future  -     TSH+Free T4; Future    4. Morbid (severe) obesity due to excess calories  -     Microalbumin / Creatinine Urine Ratio - Urine, Clean Catch; Future  -     Comprehensive Metabolic Panel; Future  -     CBC & Differential; Future  -     Ferritin; Future  -     Iron Profile; Future  -     Hemoglobin A1c; Future  -     Lipid Panel; Future  -     Uric Acid; Future  -     TSH+Free T4; Future    5. Stage 3a chronic kidney disease  -     Microalbumin / Creatinine Urine Ratio - Urine, Clean Catch; Future  -     Comprehensive Metabolic Panel; Future  -     CBC & Differential; Future  -     Ferritin; Future  -     Iron Profile; Future  -     Hemoglobin A1c; Future  -     Lipid Panel; Future  -     Uric Acid; Future  -     TSH+Free T4; Future    6. Vitamin D deficiency  -     Microalbumin / Creatinine Urine Ratio - Urine, Clean Catch; Future  -     Comprehensive Metabolic Panel; Future  -     CBC & Differential; Future  -     Ferritin; Future  -     Iron Profile; Future  -     Hemoglobin A1c; Future  -     Lipid Panel; Future  -     Uric Acid; Future  -     TSH+Free T4; Future    7. Primary insomnia  -     Microalbumin / Creatinine Urine Ratio - Urine, Clean Catch; Future  -     Comprehensive Metabolic Panel; Future  -     CBC & Differential; Future  -     Ferritin; Future  -     Iron Profile; Future  -     Hemoglobin A1c; Future  -     Lipid Panel; Future  -     Uric Acid; Future  -     TSH+Free T4; Future  -     zolpidem (AMBIEN) 5 MG tablet; Take 1 tablet by mouth At Night As Needed for Sleep. for sleep  Dispense: 90 tablet; Refill: 1    8. Malignant neoplasm of female breast, unspecified estrogen receptor status, unspecified laterality, unspecified site of breast  -     Microalbumin / Creatinine  Urine Ratio - Urine, Clean Catch; Future  -     Comprehensive Metabolic Panel; Future  -     CBC & Differential; Future  -     Ferritin; Future  -     Iron Profile; Future  -     Hemoglobin A1c; Future  -     Lipid Panel; Future  -     Uric Acid; Future  -     TSH+Free T4; Future    9. Anxiety  -     Microalbumin / Creatinine Urine Ratio - Urine, Clean Catch; Future  -     Comprehensive Metabolic Panel; Future  -     CBC & Differential; Future  -     Ferritin; Future  -     Iron Profile; Future  -     Hemoglobin A1c; Future  -     Lipid Panel; Future  -     Uric Acid; Future  -     TSH+Free T4; Future    10. Type 2 diabetes mellitus without complication, with long-term current use of insulin  -     Microalbumin / Creatinine Urine Ratio - Urine, Clean Catch; Future  -     Comprehensive Metabolic Panel; Future  -     CBC & Differential; Future  -     Ferritin; Future  -     Iron Profile; Future  -     Hemoglobin A1c; Future  -     Lipid Panel; Future  -     Uric Acid; Future  -     TSH+Free T4; Future    11. Hypothyroidism due to Hashimoto's thyroiditis  -     Microalbumin / Creatinine Urine Ratio - Urine, Clean Catch; Future  -     Comprehensive Metabolic Panel; Future  -     CBC & Differential; Future  -     Ferritin; Future  -     Iron Profile; Future  -     Hemoglobin A1c; Future  -     Lipid Panel; Future  -     Uric Acid; Future  -     TSH+Free T4; Future    12. Elevated liver enzymes  -     Microalbumin / Creatinine Urine Ratio - Urine, Clean Catch; Future  -     Comprehensive Metabolic Panel; Future  -     CBC & Differential; Future  -     Ferritin; Future  -     Iron Profile; Future  -     Hemoglobin A1c; Future  -     Lipid Panel; Future  -     Uric Acid; Future  -     TSH+Free T4; Future    13. Iron deficiency anemia, unspecified iron deficiency anemia type  -     Microalbumin / Creatinine Urine Ratio - Urine, Clean Catch; Future  -     Comprehensive Metabolic Panel; Future  -     CBC & Differential;  Future  -     Ferritin; Future  -     Iron Profile; Future  -     Hemoglobin A1c; Future  -     Lipid Panel; Future  -     Uric Acid; Future  -     TSH+Free T4; Future    Other orders  -     carvedilol (COREG) 12.5 MG tablet; Take 1 tablet by mouth 2 (Two) Times a Day With Meals.  Dispense: 180 tablet; Refill: 3  -     doxazosin (CARDURA) 8 MG tablet; Take 1 tablet by mouth Every Night.  Dispense: 90 tablet; Refill: 3  -     glyBURIDE-metFORMIN (GLUCOVANCE) 2.5-500 MG per tablet; Take 1 tablet by mouth 2 (Two) Times a Day With Meals.  Dispense: 180 tablet; Refill: 3  -     losartan-hydrochlorothiazide (Hyzaar) 100-25 MG per tablet; Take 1 tablet by mouth Daily.  Dispense: 90 tablet; Refill: 3  -     dilTIAZem (TIAZAC) 180 MG 24 hr capsule; Take 1 capsule by mouth Daily.  Dispense: 90 capsule; Refill: 3  -     glucose blood test strip; 1 each by Other route As Needed (as needed). Use as instructed  Dispense: 100 each; Refill: 3  -     Januvia 50 MG tablet; Take 1 tablet by mouth Daily.  Dispense: 90 tablet; Refill: 3  -     apixaban (ELIQUIS) 5 MG tablet tablet; Take 1 tablet by mouth 2 (Two) Times a Day.  Dispense: 180 tablet; Refill: 3  -     allopurinol (ZYLOPRIM) 300 MG tablet; Take 1 tablet by mouth Daily.  Dispense: 90 tablet; Refill: 3  -     insulin glargine (Lantus) 100 UNIT/ML injection; Inject 60 Units under the skin into the appropriate area as directed Every Night.  Dispense: 30 mL; Refill: 5  -     Insulin Glargine (LANTUS SOLOSTAR) 100 UNIT/ML injection pen; Inject 40 Units under the skin into the appropriate area as directed Daily.  Dispense: 20 mL; Refill: 5  -     Cholecalciferol (Vitamin D) 50 MCG (2000 UT) capsule; Take 1 capsule by mouth Daily.  Dispense: 90 capsule; Refill: 3  -     colestipol (COLESTID) 1 g tablet; Take 1 tablet by mouth 2 (Two) Times a Day.  Dispense: 180 tablet; Refill: 3  -     escitalopram (Lexapro) 5 MG tablet; Take 1 tablet by mouth Daily.  Dispense: 90 tablet; Refill:  3  -     Fluticasone-Umeclidin-Vilant (TRELEGY) 100-62.5-25 MCG/ACT inhaler; Inhale 1 puff Daily.  Dispense: 3 each; Refill: 3  -     albuterol sulfate HFA (Ventolin HFA) 108 (90 Base) MCG/ACT inhaler; Inhale 2 puffs Every 4 (Four) Hours As Needed for Wheezing or Shortness of Air.  Dispense: 8.5 g; Refill: 5  -     levothyroxine (SYNTHROID, LEVOTHROID) 50 MCG tablet; Take 1 tablet by mouth Every Morning.  Dispense: 90 tablet; Refill: 3  -     hydrALAZINE (APRESOLINE) 25 MG tablet; Take 1 tablet by mouth 3 (Three) Times a Day.  Dispense: 270 tablet; Refill: 3          Annual wellness visit performed on April 4, 2023    Pneumonia left lower lobe July 4, 2022,-- CT scan, August 1, 2022 of the chest, shows persistent consolidation left upper lobe with air bronchograms likely representing pneumonia tree-in-bud nodularity and opacity in the more superior upper lobe is also present, patient did see pulmonology Dr. Velásquez had a CT scan October 31, 2022 showing interval improvement in airspace consolidation within the left upper lobe some residual predominant linear consolidation and air bronchograms favored to represent postinfectious or inflammatory scarring and cholelithiasis, patient is feeling better overall    Iron deficiency anemia, hemoglobin stable 11.1 April 3, 2023---- colonoscopy Dr. Lopez 2019 multiple polyps, patient received iron infusions July 2022 -----previously had iron infusions in 2019,     Increased stress with home life stressors at home with family situation, patient stopped Lexapro previously    renal cyst versus mass, ultrasound of the kidneys November 2020 showed simple renal cysts no further workup,    Heart murmur,----- echocardiogram shows only trace mitral regurgitation and normal ejection fraction October 2020,, repeat echo shows low normal ejection fraction 50% to 55% no regional wall motion abnormalities there was some diastolic dysfunction borderline left atrial enlargement mild to  moderate mitral regurgitation but no significant valvular pathology, August 1, 2022    Subclinical hypothyroid---cont  Synthroid 0.05 mg daily     Diarrhea intermittent, uses colestipol as needed, still has her gallbladder however discussed April 4, 2023    s/p L TKA, 4/16,     chronic A. 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 status post lumpectomy guided removal December 3, 2014 with 4 sentinel lymph nodes removed all negative, currently off anastrozole per oncology/ [facility] and stable since  Dec 2020    HTN-, continues HYDRALAZINE 50 TID AND LOSARTAN 100/25 QD , DOXAZOSIN 4 MG QHS, COREG 12.5 BID, Cardizem  mg daily    B/L SHOULDER AND L KNEE OA--     GOUT- BETTER --continue ALLOPURINOL 300 mg daily    OBESE-MORBID    DM 2, HGA1C=, 6.5, April 3, 2023,   cont Januvia 50 mg daily,, Glucovance 2.5\500 mg twice a day,, were stopping insulin April 4, 2023 due to low blood sugars over the past several weeks and nonusage,    EYES CHECK BLOOM / [facility] --MARCH 2021    ELEVATED LFTS, currently normal April 3, 2023    VIT D DEF continues replacement September 2021    ELEVATED CHOL--patient intolerant of statins discussed,    INSOMNIA, ON OTC BENADRYL     CKD 3 - CREAT up to 2.6 April 3, 2023 patient may need to decrease her diuretic and drink more fluids discussed April 4, 2023 baseline creatinine has been around 1.8    Follow Up   Return in about 4 months (around 8/4/2023).  Patient was given instructions and counseling regarding her condition or for health maintenance advice. Please see specific information pulled into the AVS if appropriate.

## 2023-04-04 NOTE — PROGRESS NOTES
The ABCs of the Annual Wellness Visit  Subsequent Medicare Wellness Visit    Subjective      Luzma Dick is a 82 y.o. female who presents for a Subsequent Medicare Wellness Visit.    The following portions of the patient's history were reviewed and   updated as appropriate: allergies, current medications, past family history, past medical history, past social history, past surgical history and problem list.    Compared to one year ago, the patient feels her physical   health is the same.    Compared to one year ago, the patient feels her mental   health is the same.    Recent Hospitalizations:  She was not admitted to the hospital during the last year.       Current Medical Providers:  Patient Care Team:  Shay Ly MD as PCP - General (Internal Medicine)    Outpatient Medications Prior to Visit   Medication Sig Dispense Refill   • albuterol sulfate HFA (Ventolin HFA) 108 (90 Base) MCG/ACT inhaler Inhale 2 puffs Every 4 (Four) Hours As Needed for Wheezing or Shortness of Air. 1 g 2   • allopurinol (ZYLOPRIM) 300 MG tablet Take 1 tablet by mouth Daily. 90 tablet 3   • apixaban (ELIQUIS) 5 MG tablet tablet Take 1 tablet by mouth 2 (Two) Times a Day. 180 tablet 3   • brompheniramine-pseudoephedrine-DM 30-2-10 MG/5ML syrup Take 2.5 mL by mouth 4 (Four) Times a Day As Needed for Cough. 118 mL 0   • carvedilol (COREG) 12.5 MG tablet Take 1 tablet by mouth 2 (Two) Times a Day With Meals. 180 tablet 1   • Cholecalciferol (VITAMIN D) 2000 UNITS capsule Take 1,000 Units by mouth.     • dilTIAZem (TIAZAC) 180 MG 24 hr capsule Take 1 capsule by mouth Daily. 90 capsule 3   • doxazosin (CARDURA) 8 MG tablet Take 1 tablet by mouth Every Night. 90 tablet 3   • escitalopram (Lexapro) 5 MG tablet Take 1 tablet by mouth Daily. 30 tablet 5   • famotidine (Pepcid) 20 MG tablet Take 1 tablet by mouth 2 (Two) Times a Day. 60 tablet 5   • fluticasone (Flonase) 50 MCG/ACT nasal spray 2 sprays into the nostril(s) as directed  by provider Daily. 16 g 1   • Fluticasone-Umeclidin-Vilant (TRELEGY) 100-62.5-25 MCG/ACT inhaler Inhale 1 puff Daily. 1 each 11   • glucose blood test strip 1 each by Other route As Needed (as needed). Use as instructed 100 each 1   • glyBURIDE-metFORMIN (GLUCOVANCE) 2.5-500 MG per tablet Take 1 tablet by mouth 2 (Two) Times a Day With Meals. 180 tablet 1   • hydrALAZINE (APRESOLINE) 25 MG tablet Take 1 tablet by mouth 3 (Three) Times a Day. 270 tablet 2   • hydrOXYzine (ATARAX) 10 MG tablet Take 1 tablet by mouth 3 (Three) Times a Day As Needed for Itching. 60 tablet 2   • insulin glargine (Lantus) 100 UNIT/ML injection Inject 60 Units under the skin into the appropriate area as directed Every Night. 30 mL 5   • ipratropium-albuterol (DUO-NEB) 0.5-2.5 mg/3 ml nebulizer Take 3 mL by nebulization Every 4 (Four) Hours As Needed for Wheezing. 360 mL 3   • Januvia 50 MG tablet Take 1 tablet by mouth Daily. 90 tablet 3   • levothyroxine (SYNTHROID, LEVOTHROID) 50 MCG tablet Take 1 tablet by mouth Every Morning. 90 tablet 3   • losartan-hydrochlorothiazide (Hyzaar) 100-25 MG per tablet Take 1 tablet by mouth Daily. 90 tablet 1   • predniSONE (DELTASONE) 20 MG tablet Take 2 tablets by mouth Daily. 14 tablet 0   • triamcinolone (KENALOG) 0.5 % cream Apply 1 application topically to the appropriate area as directed 2 (Two) Times a Day. 454 g 0   • venlafaxine XR (Effexor XR) 37.5 MG 24 hr capsule Take 1 capsule by mouth Daily. 30 capsule 5   • zolpidem (AMBIEN) 5 MG tablet Take 1 tablet by mouth At Night As Needed for Sleep. for sleep 90 tablet 1   • azithromycin (ZITHROMAX) 250 MG tablet Take 2 by mouth today then 1 daily for 4 days 6 tablet 0   • dilTIAZem CD (CARDIZEM CD) 180 MG 24 hr capsule        No facility-administered medications prior to visit.       No opioid medication identified on active medication list. I have reviewed chart for other potential  high risk medication/s and harmful drug interactions in the  "elderly.          Aspirin is not on active medication list.  Aspirin use is not indicated based on review of current medical condition/s. Risk of harm outweighs potential benefits.  .    Patient Active Problem List   Diagnosis   • Malignant neoplasm of upper-outer quadrant of right female breast   • FH: breast cancer   • Hypertension, essential   • Morbid (severe) obesity due to excess calories   • Iron deficiency anemia   • Diabetes 1.5, managed as type 2   • Stage 3a chronic kidney disease   • Acute left-sided low back pain without sciatica   • Elevated liver enzymes   • Vitamin D deficiency   • Longstanding persistent atrial fibrillation   • Hypothyroidism due to Hashimoto's thyroiditis   • Primary insomnia   • Medicare annual wellness visit, subsequent   • Type 2 diabetes mellitus without complication, with long-term current use of insulin   • Breast cancer   • Arthritis   • Anxiety   • Pruritus   • Dermatitis   • Perforation of left tympanic membrane   • Persistent dry cough   • Pneumonia of left lower lobe due to infectious organism     Advance Care Planning   Advance Care Planning     Advance Directive is on file.  ACP discussion was held with the patient during this visit. Patient has an advance directive in EMR which is still valid.      Objective    Vitals:    04/04/23 1451   BP: 122/63   Pulse: 87   Temp: 96.6 °F (35.9 °C)   SpO2: 94%   Weight: 101 kg (221 lb 12.8 oz)   Height: 165.1 cm (65\")     Estimated body mass index is 36.91 kg/m² as calculated from the following:    Height as of this encounter: 165.1 cm (65\").    Weight as of this encounter: 101 kg (221 lb 12.8 oz).    Class 2 Severe Obesity (BMI >=35 and <=39.9). Obesity-related health conditions include the following: hypertension, diabetes mellitus and GERD. Obesity is unchanged. BMI is is above average; BMI management plan is completed. We discussed portion control, increasing exercise and management of depression/anxiety/stress to control " compensatory eating.      Does the patient have evidence of cognitive impairment?   No    Lab Results   Component Value Date    HGBA1C 6.50 (H) 04/03/2023          HEALTH RISK ASSESSMENT    Smoking Status:  Social History     Tobacco Use   Smoking Status Never   Smokeless Tobacco Never     Alcohol Consumption:  Social History     Substance and Sexual Activity   Alcohol Use No     Fall Risk Screen:    NIRMALAADI Fall Risk Assessment was completed, and patient is at LOW risk for falls.Assessment completed on:4/4/2023    Depression Screening:  PHQ-2/PHQ-9 Depression Screening 4/4/2023   Little Interest or Pleasure in Doing Things 0-->not at all   Feeling Down, Depressed or Hopeless 0-->not at all   PHQ-9: Brief Depression Severity Measure Score 0       Health Habits and Functional and Cognitive Screening:  Functional & Cognitive Status 4/4/2023   Do you have difficulty preparing food and eating? No   Do you have difficulty bathing yourself, getting dressed or grooming yourself? No   Do you have difficulty using the toilet? No   Do you have difficulty moving around from place to place? No   Do you have trouble with steps or getting out of a bed or a chair? No   Current Diet Well Balanced Diet   Dental Exam Not up to date   Eye Exam Up to date   Exercise (times per week) 3 times per week   Current Exercises Include Walking   Do you need help using the phone?  No   Are you deaf or do you have serious difficulty hearing?  No   Do you need help with transportation? No   Do you need help shopping? No   Do you need help preparing meals?  No   Do you need help with housework?  No   Do you need help with laundry? No   Do you need help taking your medications? No   Do you need help managing money? No   Do you ever drive or ride in a car without wearing a seat belt? No   Do you have difficulty concentrating, remembering or making decisions? No       Age-appropriate Screening Schedule:  Refer to the list below for future screening  recommendations based on patient's age, sex and/or medical conditions. Orders for these recommended tests are listed in the plan section. The patient has been provided with a written plan.    Health Maintenance   Topic Date Due   • TDAP/TD VACCINES (1 - Tdap) Never done   • ZOSTER VACCINE (1 of 2) Never done   • DIABETIC EYE EXAM  03/03/2021   • COVID-19 Vaccine (4 - Booster for Moderna series) 01/28/2022   • DXA SCAN  03/09/2022   • URINE MICROALBUMIN  04/12/2022   • MAMMOGRAM  05/20/2023   • INFLUENZA VACCINE  08/01/2023   • HEMOGLOBIN A1C  10/03/2023   • LIPID PANEL  11/17/2023   • ANNUAL WELLNESS VISIT  04/04/2024   • Pneumococcal Vaccine 65+  Completed                  CMS Preventative Services Quick Reference  Risk Factors Identified During Encounter:    Depression/Dysphoria: Current medication is effective, no change recommended    The above risks/problems have been discussed with the patient.  Pertinent information has been shared with the patient in the After Visit Summary.    Diagnoses and all orders for this visit:    1. Medicare annual wellness visit, subsequent (Primary)  -     Microalbumin / Creatinine Urine Ratio - Urine, Clean Catch; Future  -     Comprehensive Metabolic Panel; Future  -     CBC & Differential; Future  -     Ferritin; Future  -     Iron Profile; Future  -     Hemoglobin A1c; Future  -     Lipid Panel; Future  -     Uric Acid; Future  -     TSH+Free T4; Future    2. Longstanding persistent atrial fibrillation  -     Microalbumin / Creatinine Urine Ratio - Urine, Clean Catch; Future  -     Comprehensive Metabolic Panel; Future  -     CBC & Differential; Future  -     Ferritin; Future  -     Iron Profile; Future  -     Hemoglobin A1c; Future  -     Lipid Panel; Future  -     Uric Acid; Future  -     TSH+Free T4; Future    3. Hypertension, essential  -     Microalbumin / Creatinine Urine Ratio - Urine, Clean Catch; Future  -     Comprehensive Metabolic Panel; Future  -     CBC &  Differential; Future  -     Ferritin; Future  -     Iron Profile; Future  -     Hemoglobin A1c; Future  -     Lipid Panel; Future  -     Uric Acid; Future  -     TSH+Free T4; Future    4. Morbid (severe) obesity due to excess calories  -     Microalbumin / Creatinine Urine Ratio - Urine, Clean Catch; Future  -     Comprehensive Metabolic Panel; Future  -     CBC & Differential; Future  -     Ferritin; Future  -     Iron Profile; Future  -     Hemoglobin A1c; Future  -     Lipid Panel; Future  -     Uric Acid; Future  -     TSH+Free T4; Future    5. Stage 3a chronic kidney disease  -     Microalbumin / Creatinine Urine Ratio - Urine, Clean Catch; Future  -     Comprehensive Metabolic Panel; Future  -     CBC & Differential; Future  -     Ferritin; Future  -     Iron Profile; Future  -     Hemoglobin A1c; Future  -     Lipid Panel; Future  -     Uric Acid; Future  -     TSH+Free T4; Future    6. Vitamin D deficiency  -     Microalbumin / Creatinine Urine Ratio - Urine, Clean Catch; Future  -     Comprehensive Metabolic Panel; Future  -     CBC & Differential; Future  -     Ferritin; Future  -     Iron Profile; Future  -     Hemoglobin A1c; Future  -     Lipid Panel; Future  -     Uric Acid; Future  -     TSH+Free T4; Future    7. Primary insomnia  -     Microalbumin / Creatinine Urine Ratio - Urine, Clean Catch; Future  -     Comprehensive Metabolic Panel; Future  -     CBC & Differential; Future  -     Ferritin; Future  -     Iron Profile; Future  -     Hemoglobin A1c; Future  -     Lipid Panel; Future  -     Uric Acid; Future  -     TSH+Free T4; Future  -     zolpidem (AMBIEN) 5 MG tablet; Take 1 tablet by mouth At Night As Needed for Sleep. for sleep  Dispense: 90 tablet; Refill: 1    8. Malignant neoplasm of female breast, unspecified estrogen receptor status, unspecified laterality, unspecified site of breast  -     Microalbumin / Creatinine Urine Ratio - Urine, Clean Catch; Future  -     Comprehensive Metabolic  Panel; Future  -     CBC & Differential; Future  -     Ferritin; Future  -     Iron Profile; Future  -     Hemoglobin A1c; Future  -     Lipid Panel; Future  -     Uric Acid; Future  -     TSH+Free T4; Future    9. Anxiety  -     Microalbumin / Creatinine Urine Ratio - Urine, Clean Catch; Future  -     Comprehensive Metabolic Panel; Future  -     CBC & Differential; Future  -     Ferritin; Future  -     Iron Profile; Future  -     Hemoglobin A1c; Future  -     Lipid Panel; Future  -     Uric Acid; Future  -     TSH+Free T4; Future    10. Type 2 diabetes mellitus without complication, with long-term current use of insulin  -     Microalbumin / Creatinine Urine Ratio - Urine, Clean Catch; Future  -     Comprehensive Metabolic Panel; Future  -     CBC & Differential; Future  -     Ferritin; Future  -     Iron Profile; Future  -     Hemoglobin A1c; Future  -     Lipid Panel; Future  -     Uric Acid; Future  -     TSH+Free T4; Future    11. Hypothyroidism due to Hashimoto's thyroiditis  -     Microalbumin / Creatinine Urine Ratio - Urine, Clean Catch; Future  -     Comprehensive Metabolic Panel; Future  -     CBC & Differential; Future  -     Ferritin; Future  -     Iron Profile; Future  -     Hemoglobin A1c; Future  -     Lipid Panel; Future  -     Uric Acid; Future  -     TSH+Free T4; Future    12. Elevated liver enzymes  -     Microalbumin / Creatinine Urine Ratio - Urine, Clean Catch; Future  -     Comprehensive Metabolic Panel; Future  -     CBC & Differential; Future  -     Ferritin; Future  -     Iron Profile; Future  -     Hemoglobin A1c; Future  -     Lipid Panel; Future  -     Uric Acid; Future  -     TSH+Free T4; Future    13. Iron deficiency anemia, unspecified iron deficiency anemia type  -     Microalbumin / Creatinine Urine Ratio - Urine, Clean Catch; Future  -     Comprehensive Metabolic Panel; Future  -     CBC & Differential; Future  -     Ferritin; Future  -     Iron Profile; Future  -     Hemoglobin  A1c; Future  -     Lipid Panel; Future  -     Uric Acid; Future  -     TSH+Free T4; Future    Other orders  -     carvedilol (COREG) 12.5 MG tablet; Take 1 tablet by mouth 2 (Two) Times a Day With Meals.  Dispense: 180 tablet; Refill: 3  -     doxazosin (CARDURA) 8 MG tablet; Take 1 tablet by mouth Every Night.  Dispense: 90 tablet; Refill: 3  -     glyBURIDE-metFORMIN (GLUCOVANCE) 2.5-500 MG per tablet; Take 1 tablet by mouth 2 (Two) Times a Day With Meals.  Dispense: 180 tablet; Refill: 3  -     losartan-hydrochlorothiazide (Hyzaar) 100-25 MG per tablet; Take 1 tablet by mouth Daily.  Dispense: 90 tablet; Refill: 3  -     dilTIAZem (TIAZAC) 180 MG 24 hr capsule; Take 1 capsule by mouth Daily.  Dispense: 90 capsule; Refill: 3  -     glucose blood test strip; 1 each by Other route As Needed (as needed). Use as instructed  Dispense: 100 each; Refill: 3  -     Januvia 50 MG tablet; Take 1 tablet by mouth Daily.  Dispense: 90 tablet; Refill: 3  -     apixaban (ELIQUIS) 5 MG tablet tablet; Take 1 tablet by mouth 2 (Two) Times a Day.  Dispense: 180 tablet; Refill: 3  -     allopurinol (ZYLOPRIM) 300 MG tablet; Take 1 tablet by mouth Daily.  Dispense: 90 tablet; Refill: 3  -     insulin glargine (Lantus) 100 UNIT/ML injection; Inject 60 Units under the skin into the appropriate area as directed Every Night.  Dispense: 30 mL; Refill: 5  -     Insulin Glargine (LANTUS SOLOSTAR) 100 UNIT/ML injection pen; Inject 40 Units under the skin into the appropriate area as directed Daily.  Dispense: 20 mL; Refill: 5  -     Cholecalciferol (Vitamin D) 50 MCG (2000 UT) capsule; Take 1 capsule by mouth Daily.  Dispense: 90 capsule; Refill: 3  -     colestipol (COLESTID) 1 g tablet; Take 1 tablet by mouth 2 (Two) Times a Day.  Dispense: 180 tablet; Refill: 3  -     escitalopram (Lexapro) 5 MG tablet; Take 1 tablet by mouth Daily.  Dispense: 90 tablet; Refill: 3  -     Fluticasone-Umeclidin-Vilant (TRELEGY) 100-62.5-25 MCG/ACT inhaler;  Inhale 1 puff Daily.  Dispense: 3 each; Refill: 3  -     albuterol sulfate HFA (Ventolin HFA) 108 (90 Base) MCG/ACT inhaler; Inhale 2 puffs Every 4 (Four) Hours As Needed for Wheezing or Shortness of Air.  Dispense: 8.5 g; Refill: 5  -     levothyroxine (SYNTHROID, LEVOTHROID) 50 MCG tablet; Take 1 tablet by mouth Every Morning.  Dispense: 90 tablet; Refill: 3  -     hydrALAZINE (APRESOLINE) 25 MG tablet; Take 1 tablet by mouth 3 (Three) Times a Day.  Dispense: 270 tablet; Refill: 3        Follow Up:   Next Medicare Wellness visit to be scheduled in 1 year.      An After Visit Summary and PPPS were made available to the patient.

## 2023-04-05 ENCOUNTER — TELEPHONE (OUTPATIENT)
Dept: INTERNAL MEDICINE | Facility: CLINIC | Age: 83
End: 2023-04-05
Payer: MEDICARE

## 2023-04-05 DIAGNOSIS — F51.01 PRIMARY INSOMNIA: ICD-10-CM

## 2023-04-05 RX ORDER — HYDRALAZINE HYDROCHLORIDE 25 MG/1
25 TABLET, FILM COATED ORAL 3 TIMES DAILY
Qty: 270 TABLET | Refills: 3 | Status: SHIPPED | OUTPATIENT
Start: 2023-04-05

## 2023-04-05 RX ORDER — LEVOTHYROXINE SODIUM 0.05 MG/1
50 TABLET ORAL
Qty: 90 TABLET | Refills: 3 | Status: SHIPPED | OUTPATIENT
Start: 2023-04-05

## 2023-04-05 RX ORDER — CARVEDILOL 12.5 MG/1
12.5 TABLET ORAL 2 TIMES DAILY WITH MEALS
Qty: 180 TABLET | Refills: 3 | Status: SHIPPED | OUTPATIENT
Start: 2023-04-05

## 2023-04-05 RX ORDER — GLYBURIDE-METFORMIN HYDROCHLORIDE 2.5; 5 MG/1; MG/1
1 TABLET ORAL 2 TIMES DAILY WITH MEALS
Qty: 180 TABLET | Refills: 3 | Status: SHIPPED | OUTPATIENT
Start: 2023-04-05

## 2023-04-05 RX ORDER — DOXAZOSIN 8 MG/1
8 TABLET ORAL NIGHTLY
Qty: 90 TABLET | Refills: 3 | Status: SHIPPED | OUTPATIENT
Start: 2023-04-05

## 2023-04-05 RX ORDER — MULTIVIT-MIN/IRON/FOLIC ACID/K 18-600-40
2000 CAPSULE ORAL DAILY
Qty: 90 CAPSULE | Refills: 3 | Status: SHIPPED | OUTPATIENT
Start: 2023-04-05

## 2023-04-05 RX ORDER — SITAGLIPTIN 50 MG/1
50 TABLET, FILM COATED ORAL DAILY
Qty: 90 TABLET | Refills: 3 | Status: SHIPPED | OUTPATIENT
Start: 2023-04-05

## 2023-04-05 RX ORDER — ESCITALOPRAM OXALATE 5 MG/1
5 TABLET ORAL DAILY
Qty: 90 TABLET | Refills: 3 | Status: SHIPPED | OUTPATIENT
Start: 2023-04-05

## 2023-04-05 RX ORDER — MONTELUKAST SODIUM 4 MG/1
1 TABLET, CHEWABLE ORAL 2 TIMES DAILY
Qty: 180 TABLET | Refills: 3 | Status: SHIPPED | OUTPATIENT
Start: 2023-04-05

## 2023-04-05 RX ORDER — DILTIAZEM HYDROCHLORIDE 180 MG/1
180 CAPSULE, EXTENDED RELEASE ORAL DAILY
Qty: 90 CAPSULE | Refills: 3 | Status: SHIPPED | OUTPATIENT
Start: 2023-04-05

## 2023-04-05 RX ORDER — ALBUTEROL SULFATE 90 UG/1
2 AEROSOL, METERED RESPIRATORY (INHALATION) EVERY 4 HOURS PRN
Qty: 8.5 G | Refills: 5 | Status: SHIPPED | OUTPATIENT
Start: 2023-04-05

## 2023-04-05 RX ORDER — LOSARTAN POTASSIUM AND HYDROCHLOROTHIAZIDE 25; 100 MG/1; MG/1
1 TABLET ORAL DAILY
Qty: 90 TABLET | Refills: 3 | Status: SHIPPED | OUTPATIENT
Start: 2023-04-05

## 2023-04-05 RX ORDER — ZOLPIDEM TARTRATE 5 MG/1
5 TABLET ORAL NIGHTLY PRN
Qty: 90 TABLET | Refills: 1 | Status: SHIPPED | OUTPATIENT
Start: 2023-04-05

## 2023-04-05 RX ORDER — INSULIN GLARGINE 100 [IU]/ML
60 INJECTION, SOLUTION SUBCUTANEOUS NIGHTLY
Qty: 30 ML | Refills: 5 | Status: SHIPPED | OUTPATIENT
Start: 2023-04-05

## 2023-04-05 RX ORDER — ALLOPURINOL 300 MG/1
300 TABLET ORAL DAILY
Qty: 90 TABLET | Refills: 3 | Status: SHIPPED | OUTPATIENT
Start: 2023-04-05

## 2023-04-06 ENCOUNTER — TELEPHONE (OUTPATIENT)
Dept: INTERNAL MEDICINE | Facility: CLINIC | Age: 83
End: 2023-04-06
Payer: MEDICARE

## 2023-04-06 NOTE — TELEPHONE ENCOUNTER
Dr clark, it looks like in the last office note from a couple of days ago that this medication was stopped? Should patient stop this or take this medication

## 2023-04-06 NOTE — TELEPHONE ENCOUNTER
Pharmacy Name: [facility] [place] [facility] - [place], [place] - [address] - [phone]  - [phone]        Pharmacy representative phone number: [phone] [facility]    What medication are you calling in regards to: LANTUS    What question does the pharmacy have: RECEIVED TWO SETS OF DIRECTIONS FOR LANTUS, NEEDING CLARIFICATION    Who is the provider that prescribed the medication: AYLIN

## 2023-04-06 NOTE — TELEPHONE ENCOUNTER
Please call the patient and clarify that she is still taking Lantus insulin and get the exact dose that she takes and how much she takes each day and let me know

## 2023-04-07 NOTE — TELEPHONE ENCOUNTER
Pt states last taking it a week ago. But when she does take it , she takes 40 units at PM. Pt states blood sugars have been good. Please advise.

## 2023-05-02 ENCOUNTER — TELEPHONE (OUTPATIENT)
Dept: INTERNAL MEDICINE | Facility: CLINIC | Age: 83
End: 2023-05-02
Payer: MEDICARE

## 2023-05-02 DIAGNOSIS — Z12.31 SCREENING MAMMOGRAM, ENCOUNTER FOR: Primary | ICD-10-CM

## 2023-05-02 NOTE — TELEPHONE ENCOUNTER
Caller: Luzma Dick    Relationship: Self    Best call back number: [phone]     What orders are you requesting (i.e. lab or imaging): MAMMOGRAM    In what timeframe would the patient need to come in: ASAP ANY TUESDAY    Where will you receive your lab/imaging services: [facility]    Additional notes: PATIENT WANTS A Tuesday APPOINTMENT

## 2023-05-02 NOTE — TELEPHONE ENCOUNTER
I have put in this order and I tried to call her to let her know it was put in and the hospital would call her to schedule this however the phone just rang and rang

## 2023-07-31 ENCOUNTER — LAB (OUTPATIENT)
Dept: INTERNAL MEDICINE | Facility: CLINIC | Age: 83
End: 2023-07-31
Payer: MEDICARE

## 2023-07-31 DIAGNOSIS — I48.11 LONGSTANDING PERSISTENT ATRIAL FIBRILLATION: ICD-10-CM

## 2023-07-31 DIAGNOSIS — E66.01 MORBID (SEVERE) OBESITY DUE TO EXCESS CALORIES: ICD-10-CM

## 2023-07-31 DIAGNOSIS — I10 HYPERTENSION, ESSENTIAL: ICD-10-CM

## 2023-07-31 DIAGNOSIS — E55.9 VITAMIN D DEFICIENCY: ICD-10-CM

## 2023-07-31 DIAGNOSIS — M25.50 ARTHRALGIA, UNSPECIFIED JOINT: Primary | ICD-10-CM

## 2023-07-31 DIAGNOSIS — Z00.00 MEDICARE ANNUAL WELLNESS VISIT, SUBSEQUENT: ICD-10-CM

## 2023-07-31 DIAGNOSIS — N18.31 STAGE 3A CHRONIC KIDNEY DISEASE: ICD-10-CM

## 2023-07-31 DIAGNOSIS — D50.9 IRON DEFICIENCY ANEMIA, UNSPECIFIED IRON DEFICIENCY ANEMIA TYPE: ICD-10-CM

## 2023-07-31 DIAGNOSIS — C50.919 MALIGNANT NEOPLASM OF FEMALE BREAST, UNSPECIFIED ESTROGEN RECEPTOR STATUS, UNSPECIFIED LATERALITY, UNSPECIFIED SITE OF BREAST: ICD-10-CM

## 2023-07-31 DIAGNOSIS — F51.01 PRIMARY INSOMNIA: ICD-10-CM

## 2023-07-31 DIAGNOSIS — E03.8 HYPOTHYROIDISM DUE TO HASHIMOTO'S THYROIDITIS: ICD-10-CM

## 2023-07-31 DIAGNOSIS — F41.9 ANXIETY: ICD-10-CM

## 2023-07-31 DIAGNOSIS — E06.3 HYPOTHYROIDISM DUE TO HASHIMOTO'S THYROIDITIS: ICD-10-CM

## 2023-07-31 DIAGNOSIS — Z79.4 TYPE 2 DIABETES MELLITUS WITHOUT COMPLICATION, WITH LONG-TERM CURRENT USE OF INSULIN: ICD-10-CM

## 2023-07-31 DIAGNOSIS — R74.8 ELEVATED LIVER ENZYMES: ICD-10-CM

## 2023-07-31 DIAGNOSIS — E11.9 TYPE 2 DIABETES MELLITUS WITHOUT COMPLICATION, WITH LONG-TERM CURRENT USE OF INSULIN: ICD-10-CM

## 2023-08-01 ENCOUNTER — LAB (OUTPATIENT)
Dept: LAB | Facility: HOSPITAL | Age: 83
End: 2023-08-01
Payer: MEDICARE

## 2023-08-01 ENCOUNTER — OFFICE VISIT (OUTPATIENT)
Dept: INTERNAL MEDICINE | Facility: CLINIC | Age: 83
End: 2023-08-01
Payer: MEDICARE

## 2023-08-01 ENCOUNTER — TELEPHONE (OUTPATIENT)
Dept: INTERNAL MEDICINE | Facility: CLINIC | Age: 83
End: 2023-08-01

## 2023-08-01 VITALS
BODY MASS INDEX: 38.79 KG/M2 | DIASTOLIC BLOOD PRESSURE: 65 MMHG | OXYGEN SATURATION: 96 % | HEIGHT: 65 IN | HEART RATE: 80 BPM | WEIGHT: 232.8 LBS | SYSTOLIC BLOOD PRESSURE: 129 MMHG | TEMPERATURE: 97.3 F

## 2023-08-01 DIAGNOSIS — Z79.4 TYPE 2 DIABETES MELLITUS WITHOUT COMPLICATION, WITH LONG-TERM CURRENT USE OF INSULIN: ICD-10-CM

## 2023-08-01 DIAGNOSIS — M25.50 ARTHRALGIA, UNSPECIFIED JOINT: ICD-10-CM

## 2023-08-01 DIAGNOSIS — E06.3 HYPOTHYROIDISM DUE TO HASHIMOTO'S THYROIDITIS: ICD-10-CM

## 2023-08-01 DIAGNOSIS — E11.9 TYPE 2 DIABETES MELLITUS WITHOUT COMPLICATION, WITH LONG-TERM CURRENT USE OF INSULIN: ICD-10-CM

## 2023-08-01 DIAGNOSIS — N18.9 CHRONIC KIDNEY DISEASE, UNSPECIFIED CKD STAGE: Primary | ICD-10-CM

## 2023-08-01 DIAGNOSIS — E55.9 VITAMIN D DEFICIENCY: ICD-10-CM

## 2023-08-01 DIAGNOSIS — E03.8 HYPOTHYROIDISM DUE TO HASHIMOTO'S THYROIDITIS: ICD-10-CM

## 2023-08-01 DIAGNOSIS — Z00.00 MEDICARE ANNUAL WELLNESS VISIT, SUBSEQUENT: ICD-10-CM

## 2023-08-01 DIAGNOSIS — E66.01 MORBID (SEVERE) OBESITY DUE TO EXCESS CALORIES: ICD-10-CM

## 2023-08-01 DIAGNOSIS — D64.9 ANEMIA, UNSPECIFIED TYPE: ICD-10-CM

## 2023-08-01 DIAGNOSIS — N18.31 STAGE 3A CHRONIC KIDNEY DISEASE: ICD-10-CM

## 2023-08-01 DIAGNOSIS — I48.11 LONGSTANDING PERSISTENT ATRIAL FIBRILLATION: ICD-10-CM

## 2023-08-01 DIAGNOSIS — R74.8 ELEVATED LIVER ENZYMES: ICD-10-CM

## 2023-08-01 DIAGNOSIS — I10 HYPERTENSION, ESSENTIAL: ICD-10-CM

## 2023-08-01 DIAGNOSIS — C50.919 MALIGNANT NEOPLASM OF FEMALE BREAST, UNSPECIFIED ESTROGEN RECEPTOR STATUS, UNSPECIFIED LATERALITY, UNSPECIFIED SITE OF BREAST: ICD-10-CM

## 2023-08-01 DIAGNOSIS — F51.01 PRIMARY INSOMNIA: ICD-10-CM

## 2023-08-01 DIAGNOSIS — F41.9 ANXIETY: Primary | ICD-10-CM

## 2023-08-01 DIAGNOSIS — D50.9 IRON DEFICIENCY ANEMIA, UNSPECIFIED IRON DEFICIENCY ANEMIA TYPE: ICD-10-CM

## 2023-08-01 DIAGNOSIS — F41.9 ANXIETY: ICD-10-CM

## 2023-08-01 LAB
ALBUMIN SERPL-MCNC: 3.9 G/DL (ref 3.5–5.2)
ALBUMIN UR-MCNC: 1.8 MG/DL
ALBUMIN/GLOB SERPL: 1.3 G/DL
ALP SERPL-CCNC: 98 U/L (ref 39–117)
ALT SERPL W P-5'-P-CCNC: 15 U/L (ref 1–33)
ANION GAP SERPL CALCULATED.3IONS-SCNC: 11.6 MMOL/L (ref 5–15)
AST SERPL-CCNC: 18 U/L (ref 1–32)
BASOPHILS # BLD AUTO: 0.01 10*3/MM3 (ref 0–0.2)
BASOPHILS NFR BLD AUTO: 0.1 % (ref 0–1.5)
BILIRUB SERPL-MCNC: 0.2 MG/DL (ref 0–1.2)
BUN SERPL-MCNC: 45 MG/DL (ref 8–23)
BUN/CREAT SERPL: 18.6 (ref 7–25)
CALCIUM SPEC-SCNC: 10 MG/DL (ref 8.6–10.5)
CHLORIDE SERPL-SCNC: 106 MMOL/L (ref 98–107)
CHOLEST SERPL-MCNC: 203 MG/DL (ref 0–200)
CO2 SERPL-SCNC: 20.4 MMOL/L (ref 22–29)
CREAT SERPL-MCNC: 2.42 MG/DL (ref 0.57–1)
CREAT UR-MCNC: 57.7 MG/DL
DEPRECATED RDW RBC AUTO: 44.3 FL (ref 37–54)
EGFRCR SERPLBLD CKD-EPI 2021: 19.4 ML/MIN/1.73
EOSINOPHIL # BLD AUTO: 0.41 10*3/MM3 (ref 0–0.4)
EOSINOPHIL NFR BLD AUTO: 4.9 % (ref 0.3–6.2)
ERYTHROCYTE [DISTWIDTH] IN BLOOD BY AUTOMATED COUNT: 14.3 % (ref 12.3–15.4)
FERRITIN SERPL-MCNC: 31.7 NG/ML (ref 13–150)
GLOBULIN UR ELPH-MCNC: 3.1 GM/DL
GLUCOSE SERPL-MCNC: 199 MG/DL (ref 65–99)
HBA1C MFR BLD: 6.8 % (ref 4.8–5.6)
HCT VFR BLD AUTO: 27.9 % (ref 34–46.6)
HDLC SERPL-MCNC: 43 MG/DL (ref 40–60)
HGB BLD-MCNC: 9.1 G/DL (ref 12–15.9)
IMM GRANULOCYTES # BLD AUTO: 0.05 10*3/MM3 (ref 0–0.05)
IMM GRANULOCYTES NFR BLD AUTO: 0.6 % (ref 0–0.5)
IRON 24H UR-MRATE: 41 MCG/DL (ref 37–145)
IRON SATN MFR SERPL: 10 % (ref 20–50)
LDLC SERPL CALC-MCNC: 107 MG/DL (ref 0–100)
LDLC/HDLC SERPL: 2.28 {RATIO}
LYMPHOCYTES # BLD AUTO: 2.84 10*3/MM3 (ref 0.7–3.1)
LYMPHOCYTES NFR BLD AUTO: 34.2 % (ref 19.6–45.3)
MCH RBC QN AUTO: 28.2 PG (ref 26.6–33)
MCHC RBC AUTO-ENTMCNC: 32.6 G/DL (ref 31.5–35.7)
MCV RBC AUTO: 86.4 FL (ref 79–97)
MICROALBUMIN/CREAT UR: 31.2 MG/G
MONOCYTES # BLD AUTO: 0.49 10*3/MM3 (ref 0.1–0.9)
MONOCYTES NFR BLD AUTO: 5.9 % (ref 5–12)
NEUTROPHILS NFR BLD AUTO: 4.5 10*3/MM3 (ref 1.7–7)
NEUTROPHILS NFR BLD AUTO: 54.3 % (ref 42.7–76)
NRBC BLD AUTO-RTO: 0 /100 WBC (ref 0–0.2)
PLATELET # BLD AUTO: 218 10*3/MM3 (ref 140–450)
PMV BLD AUTO: 10 FL (ref 6–12)
POTASSIUM SERPL-SCNC: 5.1 MMOL/L (ref 3.5–5.2)
PROT SERPL-MCNC: 7 G/DL (ref 6–8.5)
RBC # BLD AUTO: 3.23 10*6/MM3 (ref 3.77–5.28)
SODIUM SERPL-SCNC: 138 MMOL/L (ref 136–145)
T4 FREE SERPL-MCNC: 1.07 NG/DL (ref 0.93–1.7)
TIBC SERPL-MCNC: 420 MCG/DL (ref 298–536)
TRANSFERRIN SERPL-MCNC: 282 MG/DL (ref 200–360)
TRIGL SERPL-MCNC: 310 MG/DL (ref 0–150)
TSH SERPL DL<=0.05 MIU/L-ACNC: 3.12 UIU/ML (ref 0.27–4.2)
URATE SERPL-MCNC: 4.6 MG/DL (ref 2.4–5.7)
VLDLC SERPL-MCNC: 53 MG/DL (ref 5–40)
WBC NRBC COR # BLD: 8.3 10*3/MM3 (ref 3.4–10.8)

## 2023-08-01 PROCEDURE — 82728 ASSAY OF FERRITIN: CPT

## 2023-08-01 PROCEDURE — 84466 ASSAY OF TRANSFERRIN: CPT

## 2023-08-01 PROCEDURE — 82043 UR ALBUMIN QUANTITATIVE: CPT

## 2023-08-01 PROCEDURE — 3074F SYST BP LT 130 MM HG: CPT | Performed by: INTERNAL MEDICINE

## 2023-08-01 PROCEDURE — 85025 COMPLETE CBC W/AUTO DIFF WBC: CPT

## 2023-08-01 PROCEDURE — 83540 ASSAY OF IRON: CPT

## 2023-08-01 PROCEDURE — 83036 HEMOGLOBIN GLYCOSYLATED A1C: CPT

## 2023-08-01 PROCEDURE — 3078F DIAST BP <80 MM HG: CPT | Performed by: INTERNAL MEDICINE

## 2023-08-01 PROCEDURE — 82570 ASSAY OF URINE CREATININE: CPT

## 2023-08-01 PROCEDURE — 80053 COMPREHEN METABOLIC PANEL: CPT

## 2023-08-01 PROCEDURE — 84439 ASSAY OF FREE THYROXINE: CPT

## 2023-08-01 PROCEDURE — 80061 LIPID PANEL: CPT

## 2023-08-01 PROCEDURE — 84443 ASSAY THYROID STIM HORMONE: CPT

## 2023-08-01 PROCEDURE — 99214 OFFICE O/P EST MOD 30 MIN: CPT | Performed by: INTERNAL MEDICINE

## 2023-08-01 PROCEDURE — 36415 COLL VENOUS BLD VENIPUNCTURE: CPT

## 2023-08-01 PROCEDURE — 84550 ASSAY OF BLOOD/URIC ACID: CPT

## 2023-08-01 RX ORDER — MONTELUKAST SODIUM 4 MG/1
1 TABLET, CHEWABLE ORAL 2 TIMES DAILY
Qty: 180 TABLET | Refills: 3 | Status: SHIPPED | OUTPATIENT
Start: 2023-08-01

## 2023-08-09 ENCOUNTER — TELEPHONE (OUTPATIENT)
Dept: INTERNAL MEDICINE | Facility: CLINIC | Age: 83
End: 2023-08-09

## 2023-08-09 NOTE — TELEPHONE ENCOUNTER
Spoke to daughter and let her know that referral was for Dr Eagel and that scheduling will call her with that appointment- Done

## 2023-08-09 NOTE — TELEPHONE ENCOUNTER
Caller: LIGIA MCCABE    Relationship to patient: Child    Best call back number: [phone]    Patient is needing: PATIENTS DAUGHTER CALLED WANTING TO CHECK ON THE STATUS OF PATIENTS REFERRAL FOR A KIDNEY SPECIALIST. DAUGHTER STATES THAT THE PATIENTS PREFERS DOCTOR DAMIEN IF HE IS THE RIGHT KIND OF SPECIALIST SHE IS NEEDING TO SEE.

## 2023-10-09 NOTE — TELEPHONE ENCOUNTER
Caller: Luzma Dick    Relationship: Self    Best call back number: [phone]     Requested Prescriptions:   Requested Prescriptions     Pending Prescriptions Disp Refills    hydrALAZINE (APRESOLINE) 25 MG tablet 270 tablet 3     Sig: Take 1 tablet by mouth 3 (Three) Times a Day.    doxazosin (CARDURA) 8 MG tablet 90 tablet 3     Sig: Take 1 tablet by mouth Every Night.        Pharmacy where request should be sent: [facility] [phone] [place], [place] [id number]      Last office visit with prescribing clinician: 8/1/2023   Last telemedicine visit with prescribing clinician: Visit date not found   Next office visit with prescribing clinician: 2/1/2024     Does the patient have less than a 3 day supply:  [] Yes  [x] No    Would you like a call back once the refill request has been completed: [x] Yes [] No    If the office needs to give you a call back, can they leave a voicemail: [] Yes [] No    Shelbi Anne Rep   10/09/23 14:22 EDT

## 2023-10-10 RX ORDER — HYDRALAZINE HYDROCHLORIDE 25 MG/1
25 TABLET, FILM COATED ORAL 3 TIMES DAILY
Qty: 270 TABLET | Refills: 3 | Status: SHIPPED | OUTPATIENT
Start: 2023-10-10

## 2023-10-10 RX ORDER — DOXAZOSIN 8 MG/1
8 TABLET ORAL NIGHTLY
Qty: 90 TABLET | Refills: 3 | Status: SHIPPED | OUTPATIENT
Start: 2023-10-10

## 2023-10-13 RX ORDER — LOSARTAN POTASSIUM AND HYDROCHLOROTHIAZIDE 25; 100 MG/1; MG/1
1 TABLET ORAL DAILY
Qty: 90 TABLET | Refills: 3 | Status: SHIPPED | OUTPATIENT
Start: 2023-10-13

## 2023-10-13 RX ORDER — GLYBURIDE-METFORMIN HYDROCHLORIDE 2.5; 5 MG/1; MG/1
1 TABLET ORAL 2 TIMES DAILY WITH MEALS
Qty: 180 TABLET | Refills: 3 | Status: SHIPPED | OUTPATIENT
Start: 2023-10-13

## 2023-10-13 NOTE — TELEPHONE ENCOUNTER
Caller: LIGIA THORNE    Relationship: DAUGHTER    Best call back number: [phone]     Requested Prescriptions:   Requested Prescriptions     Pending Prescriptions Disp Refills    losartan-hydrochlorothiazide (Hyzaar) 100-25 MG per tablet 90 tablet 3     Sig: Take 1 tablet by mouth Daily.    glyBURIDE-metFORMIN (GLUCOVANCE) 2.5-500 MG per tablet 180 tablet 3     Sig: Take 1 tablet by mouth 2 (Two) Times a Day With Meals.        Pharmacy where request should be sent: [id number] - [place], [place] - [address] [facility] [address] - [phone]  - [phone] FX     Last office visit with prescribing clinician: 8/1/2023   Last telemedicine visit with prescribing clinician: Visit date not found   Next office visit with prescribing clinician: 2/1/2024     Additional details provided by patient: PATIENT'S DAUGHTER IS REQUESTING A 90 DAY SUPPLY    Does the patient have less than a 3 day supply:  [x] Yes  [] No    Would you like a call back once the refill request has been completed: [] Yes [] No    If the office needs to give you a call back, can they leave a voicemail: [] Yes [] No    Shelbi Anne Rep   10/13/23 13:54 EDT

## 2023-11-21 ENCOUNTER — HOSPITAL ENCOUNTER (OUTPATIENT)
Dept: MAMMOGRAPHY | Facility: HOSPITAL | Age: 83
Discharge: HOME | End: 2023-11-21
Admitting: INTERNAL MEDICINE
Payer: MEDICARE

## 2023-11-21 DIAGNOSIS — Z12.31 SCREENING MAMMOGRAM, ENCOUNTER FOR: ICD-10-CM

## 2023-11-21 PROCEDURE — 77063 BREAST TOMOSYNTHESIS BI: CPT

## 2023-11-21 PROCEDURE — 77067 SCR MAMMO BI INCL CAD: CPT

## 2023-12-12 ENCOUNTER — LAB (OUTPATIENT)
Dept: LAB | Facility: HOSPITAL | Age: 83
End: 2023-12-12
Payer: MEDICARE

## 2023-12-12 ENCOUNTER — TRANSCRIBE ORDERS (OUTPATIENT)
Dept: LAB | Facility: HOSPITAL | Age: 83
End: 2023-12-12
Payer: MEDICARE

## 2023-12-12 DIAGNOSIS — E55.9 AVITAMINOSIS D: ICD-10-CM

## 2023-12-12 DIAGNOSIS — E03.9 MYXEDEMA HEART DISEASE: ICD-10-CM

## 2023-12-12 DIAGNOSIS — I48.11 LONGSTANDING PERSISTENT ATRIAL FIBRILLATION: ICD-10-CM

## 2023-12-12 DIAGNOSIS — I51.9 MYXEDEMA HEART DISEASE: ICD-10-CM

## 2023-12-12 DIAGNOSIS — C50.919 MALIGNANT NEOPLASM OF FEMALE BREAST, UNSPECIFIED ESTROGEN RECEPTOR STATUS, UNSPECIFIED LATERALITY, UNSPECIFIED SITE OF BREAST: ICD-10-CM

## 2023-12-12 DIAGNOSIS — N28.89 URETERAL FISTULA: ICD-10-CM

## 2023-12-12 DIAGNOSIS — E78.2 MIXED HYPERLIPIDEMIA: ICD-10-CM

## 2023-12-12 DIAGNOSIS — D50.9 IRON DEFICIENCY ANEMIA, UNSPECIFIED IRON DEFICIENCY ANEMIA TYPE: ICD-10-CM

## 2023-12-12 DIAGNOSIS — N18.4 CHRONIC KIDNEY DISEASE, STAGE IV (SEVERE): Primary | ICD-10-CM

## 2023-12-12 DIAGNOSIS — I10 ESSENTIAL HYPERTENSION, MALIGNANT: ICD-10-CM

## 2023-12-12 DIAGNOSIS — N18.4 CHRONIC KIDNEY DISEASE, STAGE IV (SEVERE): ICD-10-CM

## 2023-12-12 DIAGNOSIS — E06.3 CHRONIC LYMPHOCYTIC THYROIDITIS: ICD-10-CM

## 2023-12-12 DIAGNOSIS — N28.1 ACQUIRED CYST OF KIDNEY: ICD-10-CM

## 2023-12-12 DIAGNOSIS — M10.00 IDIOPATHIC GOUT, UNSPECIFIED CHRONICITY, UNSPECIFIED SITE: ICD-10-CM

## 2023-12-12 LAB
25(OH)D3 SERPL-MCNC: 33.1 NG/ML (ref 30–100)
ALBUMIN SERPL-MCNC: 3.7 G/DL (ref 3.5–5.2)
ANION GAP SERPL CALCULATED.3IONS-SCNC: 10.3 MMOL/L (ref 5–15)
BACTERIA UR QL AUTO: ABNORMAL /HPF
BASOPHILS # BLD AUTO: 0.01 10*3/MM3 (ref 0–0.2)
BASOPHILS NFR BLD AUTO: 0.2 % (ref 0–1.5)
BILIRUB UR QL STRIP: NEGATIVE
BUN SERPL-MCNC: 29 MG/DL (ref 8–23)
BUN/CREAT SERPL: 13.1 (ref 7–25)
CALCIUM SPEC-SCNC: 9.9 MG/DL (ref 8.6–10.5)
CHLORIDE SERPL-SCNC: 109 MMOL/L (ref 98–107)
CLARITY UR: CLEAR
CO2 SERPL-SCNC: 22.7 MMOL/L (ref 22–29)
COLOR UR: YELLOW
CREAT SERPL-MCNC: 2.22 MG/DL (ref 0.57–1)
CREAT UR-MCNC: 80.2 MG/DL
DEPRECATED RDW RBC AUTO: 45.9 FL (ref 37–54)
EGFRCR SERPLBLD CKD-EPI 2021: 21.5 ML/MIN/1.73
EOSINOPHIL # BLD AUTO: 0.18 10*3/MM3 (ref 0–0.4)
EOSINOPHIL NFR BLD AUTO: 2.8 % (ref 0.3–6.2)
ERYTHROCYTE [DISTWIDTH] IN BLOOD BY AUTOMATED COUNT: 16 % (ref 12.3–15.4)
FERRITIN SERPL-MCNC: 16.3 NG/ML (ref 13–150)
FOLATE SERPL-MCNC: >20 NG/ML (ref 4.78–24.2)
GLUCOSE SERPL-MCNC: 143 MG/DL (ref 65–99)
GLUCOSE UR STRIP-MCNC: NEGATIVE MG/DL
HCT VFR BLD AUTO: 24.1 % (ref 34–46.6)
HGB BLD-MCNC: 7.3 G/DL (ref 12–15.9)
HGB UR QL STRIP.AUTO: NEGATIVE
HYALINE CASTS UR QL AUTO: ABNORMAL /LPF
IMM GRANULOCYTES # BLD AUTO: 0.03 10*3/MM3 (ref 0–0.05)
IMM GRANULOCYTES NFR BLD AUTO: 0.5 % (ref 0–0.5)
IRON 24H UR-MRATE: 31 MCG/DL (ref 37–145)
IRON SATN MFR SERPL: 7 % (ref 20–50)
KETONES UR QL STRIP: NEGATIVE
LEUKOCYTE ESTERASE UR QL STRIP.AUTO: NEGATIVE
LYMPHOCYTES # BLD AUTO: 2.46 10*3/MM3 (ref 0.7–3.1)
LYMPHOCYTES NFR BLD AUTO: 38.2 % (ref 19.6–45.3)
MCH RBC QN AUTO: 24.1 PG (ref 26.6–33)
MCHC RBC AUTO-ENTMCNC: 30.3 G/DL (ref 31.5–35.7)
MCV RBC AUTO: 79.5 FL (ref 79–97)
MONOCYTES # BLD AUTO: 0.42 10*3/MM3 (ref 0.1–0.9)
MONOCYTES NFR BLD AUTO: 6.5 % (ref 5–12)
NEUTROPHILS NFR BLD AUTO: 3.34 10*3/MM3 (ref 1.7–7)
NEUTROPHILS NFR BLD AUTO: 51.8 % (ref 42.7–76)
NITRITE UR QL STRIP: NEGATIVE
NRBC BLD AUTO-RTO: 0 /100 WBC (ref 0–0.2)
PH UR STRIP.AUTO: 6.5 [PH] (ref 5–8)
PHOSPHATE SERPL-MCNC: 3.4 MG/DL (ref 2.5–4.5)
PLATELET # BLD AUTO: 231 10*3/MM3 (ref 140–450)
PMV BLD AUTO: 10.4 FL (ref 6–12)
POTASSIUM SERPL-SCNC: 5.2 MMOL/L (ref 3.5–5.2)
PROT ?TM UR-MCNC: 17.3 MG/DL
PROT UR QL STRIP: ABNORMAL
PROT/CREAT UR: 0.22 MG/G{CREAT}
PTH-INTACT SERPL-MCNC: 48.8 PG/ML (ref 15–65)
RBC # BLD AUTO: 3.03 10*6/MM3 (ref 3.77–5.28)
RBC # UR STRIP: ABNORMAL /HPF
REF LAB TEST METHOD: ABNORMAL
SODIUM SERPL-SCNC: 142 MMOL/L (ref 136–145)
SP GR UR STRIP: 1.02 (ref 1–1.03)
SQUAMOUS #/AREA URNS HPF: ABNORMAL /HPF
TIBC SERPL-MCNC: 447 MCG/DL (ref 298–536)
TRANSFERRIN SERPL-MCNC: 300 MG/DL (ref 200–360)
URATE SERPL-MCNC: 4 MG/DL (ref 2.4–5.7)
UROBILINOGEN UR QL STRIP: ABNORMAL
VIT B12 BLD-MCNC: 464 PG/ML (ref 211–946)
WBC # UR STRIP: ABNORMAL /HPF
WBC NRBC COR # BLD AUTO: 6.44 10*3/MM3 (ref 3.4–10.8)

## 2023-12-12 PROCEDURE — 80069 RENAL FUNCTION PANEL: CPT

## 2023-12-12 PROCEDURE — 82570 ASSAY OF URINE CREATININE: CPT

## 2023-12-12 PROCEDURE — 85025 COMPLETE CBC W/AUTO DIFF WBC: CPT

## 2023-12-12 PROCEDURE — 82746 ASSAY OF FOLIC ACID SERUM: CPT

## 2023-12-12 PROCEDURE — 83540 ASSAY OF IRON: CPT

## 2023-12-12 PROCEDURE — 84550 ASSAY OF BLOOD/URIC ACID: CPT

## 2023-12-12 PROCEDURE — 82607 VITAMIN B-12: CPT

## 2023-12-12 PROCEDURE — 84466 ASSAY OF TRANSFERRIN: CPT

## 2023-12-12 PROCEDURE — 82728 ASSAY OF FERRITIN: CPT

## 2023-12-12 PROCEDURE — 83970 ASSAY OF PARATHORMONE: CPT

## 2023-12-12 PROCEDURE — 82306 VITAMIN D 25 HYDROXY: CPT

## 2023-12-12 PROCEDURE — 81001 URINALYSIS AUTO W/SCOPE: CPT

## 2023-12-12 PROCEDURE — 36415 COLL VENOUS BLD VENIPUNCTURE: CPT

## 2023-12-12 PROCEDURE — 84156 ASSAY OF PROTEIN URINE: CPT

## 2024-01-09 ENCOUNTER — TRANSCRIBE ORDERS (OUTPATIENT)
Dept: ADMINISTRATIVE | Facility: HOSPITAL | Age: 84
End: 2024-01-09
Payer: MEDICARE

## 2024-01-09 DIAGNOSIS — D63.1 ANEMIA ASSOCIATED WITH CHRONIC RENAL FAILURE: ICD-10-CM

## 2024-01-09 DIAGNOSIS — D63.1 ANEMIA IN END-STAGE RENAL DISEASE: Primary | ICD-10-CM

## 2024-01-09 DIAGNOSIS — N18.9 ANEMIA ASSOCIATED WITH CHRONIC RENAL FAILURE: ICD-10-CM

## 2024-01-09 DIAGNOSIS — N18.6 ANEMIA IN END-STAGE RENAL DISEASE: Primary | ICD-10-CM

## 2024-01-09 DIAGNOSIS — N18.4 CHRONIC KIDNEY DISEASE, STAGE IV (SEVERE): ICD-10-CM

## 2024-01-12 PROBLEM — N18.4 ANEMIA OF CHRONIC RENAL FAILURE, STAGE 4 (SEVERE): Status: ACTIVE | Noted: 2024-01-12

## 2024-01-12 PROBLEM — D63.1 ANEMIA OF CHRONIC RENAL FAILURE, STAGE 4 (SEVERE): Status: ACTIVE | Noted: 2024-01-12

## 2024-01-22 DIAGNOSIS — F51.01 PRIMARY INSOMNIA: ICD-10-CM

## 2024-01-22 RX ORDER — ZOLPIDEM TARTRATE 5 MG/1
5 TABLET ORAL NIGHTLY PRN
Qty: 90 TABLET | Refills: 1 | Status: SHIPPED | OUTPATIENT
Start: 2024-01-22

## 2024-01-22 NOTE — TELEPHONE ENCOUNTER
Caller: LIGIA THORNE    Relationship: Child    Best call back number: [phone]     Requested Prescriptions:   Requested Prescriptions     Pending Prescriptions Disp Refills    zolpidem (AMBIEN) 5 MG tablet 90 tablet 1     Sig: Take 1 tablet by mouth At Night As Needed for Sleep. for sleep        Pharmacy where request should be sent: [id number] [place] [facility] [address]  [address] [facility] [address] - [phone] [facility] [phone] FX     Last office visit with prescribing clinician: 8/1/2023   Last telemedicine visit with prescribing clinician: Visit date not found   Next office visit with prescribing clinician: 2/1/2024     Does the patient have less than a 3 day supply:  [x] Yes  [] No    Shelbi Dawkins Rep   01/22/24 10:10 EST

## 2024-01-23 ENCOUNTER — HOSPITAL ENCOUNTER (OUTPATIENT)
Dept: INFUSION THERAPY | Facility: HOSPITAL | Age: 84
Discharge: HOME | End: 2024-01-23
Admitting: INTERNAL MEDICINE
Payer: MEDICARE

## 2024-01-23 VITALS
TEMPERATURE: 97.9 F | RESPIRATION RATE: 20 BRPM | SYSTOLIC BLOOD PRESSURE: 125 MMHG | HEIGHT: 65 IN | BODY MASS INDEX: 37.94 KG/M2 | OXYGEN SATURATION: 97 % | DIASTOLIC BLOOD PRESSURE: 68 MMHG | HEART RATE: 70 BPM | WEIGHT: 227.74 LBS

## 2024-01-23 DIAGNOSIS — N18.4 ANEMIA OF CHRONIC RENAL FAILURE, STAGE 4 (SEVERE): Primary | ICD-10-CM

## 2024-01-23 DIAGNOSIS — D63.1 ANEMIA OF CHRONIC RENAL FAILURE, STAGE 4 (SEVERE): Primary | ICD-10-CM

## 2024-01-23 DIAGNOSIS — D50.9 IRON DEFICIENCY ANEMIA, UNSPECIFIED IRON DEFICIENCY ANEMIA TYPE: ICD-10-CM

## 2024-01-23 PROCEDURE — 96365 THER/PROPH/DIAG IV INF INIT: CPT

## 2024-01-23 PROCEDURE — 96374 THER/PROPH/DIAG INJ IV PUSH: CPT

## 2024-01-23 PROCEDURE — 25010000002 FERRIC CARBOXYMALTOSE 750 MG/15ML SOLUTION: Performed by: INTERNAL MEDICINE

## 2024-01-23 RX ADMIN — FERRIC CARBOXYMALTOSE INJECTION 750 MG: 50 INJECTION, SOLUTION INTRAVENOUS at 12:34

## 2024-01-29 ENCOUNTER — CLINICAL SUPPORT (OUTPATIENT)
Dept: INTERNAL MEDICINE | Facility: CLINIC | Age: 84
End: 2024-01-29
Payer: MEDICARE

## 2024-01-29 DIAGNOSIS — E11.9 TYPE 2 DIABETES MELLITUS WITHOUT COMPLICATION, WITH LONG-TERM CURRENT USE OF INSULIN: ICD-10-CM

## 2024-01-29 DIAGNOSIS — E66.01 MORBID (SEVERE) OBESITY DUE TO EXCESS CALORIES: ICD-10-CM

## 2024-01-29 DIAGNOSIS — M25.50 ARTHRALGIA, UNSPECIFIED JOINT: ICD-10-CM

## 2024-01-29 DIAGNOSIS — E03.8 HYPOTHYROIDISM DUE TO HASHIMOTO'S THYROIDITIS: ICD-10-CM

## 2024-01-29 DIAGNOSIS — F41.9 ANXIETY: ICD-10-CM

## 2024-01-29 DIAGNOSIS — Z79.4 TYPE 2 DIABETES MELLITUS WITHOUT COMPLICATION, WITH LONG-TERM CURRENT USE OF INSULIN: ICD-10-CM

## 2024-01-29 DIAGNOSIS — I10 HYPERTENSION, ESSENTIAL: ICD-10-CM

## 2024-01-29 DIAGNOSIS — E06.3 HYPOTHYROIDISM DUE TO HASHIMOTO'S THYROIDITIS: ICD-10-CM

## 2024-01-29 DIAGNOSIS — D50.9 IRON DEFICIENCY ANEMIA, UNSPECIFIED IRON DEFICIENCY ANEMIA TYPE: ICD-10-CM

## 2024-01-29 LAB
ALBUMIN SERPL-MCNC: 3.7 G/DL (ref 3.5–5.2)
ALBUMIN/GLOB SERPL: 1.2 G/DL
ALP SERPL-CCNC: 116 U/L (ref 39–117)
ALT SERPL W P-5'-P-CCNC: 9 U/L (ref 1–33)
ANION GAP SERPL CALCULATED.3IONS-SCNC: 11.9 MMOL/L (ref 5–15)
AST SERPL-CCNC: 16 U/L (ref 1–32)
BASOPHILS # BLD AUTO: 0.02 10*3/MM3 (ref 0–0.2)
BASOPHILS NFR BLD AUTO: 0.2 % (ref 0–1.5)
BILIRUB SERPL-MCNC: <0.2 MG/DL (ref 0–1.2)
BUN SERPL-MCNC: 33 MG/DL (ref 8–23)
BUN/CREAT SERPL: 14.9 (ref 7–25)
CALCIUM SPEC-SCNC: 9.5 MG/DL (ref 8.6–10.5)
CHLORIDE SERPL-SCNC: 108 MMOL/L (ref 98–107)
CHOLEST SERPL-MCNC: 165 MG/DL (ref 0–200)
CO2 SERPL-SCNC: 20.1 MMOL/L (ref 22–29)
CREAT SERPL-MCNC: 2.21 MG/DL (ref 0.57–1)
DEPRECATED RDW RBC AUTO: 51.7 FL (ref 37–54)
EGFRCR SERPLBLD CKD-EPI 2021: 21.6 ML/MIN/1.73
EOSINOPHIL # BLD AUTO: 0.23 10*3/MM3 (ref 0–0.4)
EOSINOPHIL NFR BLD AUTO: 2.8 % (ref 0.3–6.2)
ERYTHROCYTE [DISTWIDTH] IN BLOOD BY AUTOMATED COUNT: 18.3 % (ref 12.3–15.4)
GLOBULIN UR ELPH-MCNC: 3.1 GM/DL
GLUCOSE SERPL-MCNC: 160 MG/DL (ref 65–99)
HBA1C MFR BLD: 7.3 % (ref 4.8–5.6)
HCT VFR BLD AUTO: 25.9 % (ref 34–46.6)
HDLC SERPL-MCNC: 40 MG/DL (ref 40–60)
HGB BLD-MCNC: 8.1 G/DL (ref 12–15.9)
IMM GRANULOCYTES # BLD AUTO: 0.12 10*3/MM3 (ref 0–0.05)
IMM GRANULOCYTES NFR BLD AUTO: 1.5 % (ref 0–0.5)
IRON 24H UR-MRATE: 78 MCG/DL (ref 37–145)
IRON SATN MFR SERPL: 20 % (ref 20–50)
LDLC SERPL CALC-MCNC: 90 MG/DL (ref 0–100)
LDLC/HDLC SERPL: 2.08 {RATIO}
LYMPHOCYTES # BLD AUTO: 2.78 10*3/MM3 (ref 0.7–3.1)
LYMPHOCYTES NFR BLD AUTO: 34.4 % (ref 19.6–45.3)
MCH RBC QN AUTO: 24.8 PG (ref 26.6–33)
MCHC RBC AUTO-ENTMCNC: 31.3 G/DL (ref 31.5–35.7)
MCV RBC AUTO: 79.4 FL (ref 79–97)
MONOCYTES # BLD AUTO: 0.53 10*3/MM3 (ref 0.1–0.9)
MONOCYTES NFR BLD AUTO: 6.6 % (ref 5–12)
NEUTROPHILS NFR BLD AUTO: 4.4 10*3/MM3 (ref 1.7–7)
NEUTROPHILS NFR BLD AUTO: 54.5 % (ref 42.7–76)
NRBC BLD AUTO-RTO: 0 /100 WBC (ref 0–0.2)
PLATELET # BLD AUTO: 237 10*3/MM3 (ref 140–450)
PMV BLD AUTO: 10.3 FL (ref 6–12)
POTASSIUM SERPL-SCNC: 4.7 MMOL/L (ref 3.5–5.2)
PROT SERPL-MCNC: 6.8 G/DL (ref 6–8.5)
RBC # BLD AUTO: 3.26 10*6/MM3 (ref 3.77–5.28)
SODIUM SERPL-SCNC: 140 MMOL/L (ref 136–145)
T4 FREE SERPL-MCNC: 1.12 NG/DL (ref 0.93–1.7)
TIBC SERPL-MCNC: 392 MCG/DL (ref 298–536)
TRANSFERRIN SERPL-MCNC: 263 MG/DL (ref 200–360)
TRIGL SERPL-MCNC: 209 MG/DL (ref 0–150)
TSH SERPL DL<=0.05 MIU/L-ACNC: 2.55 UIU/ML (ref 0.27–4.2)
URATE SERPL-MCNC: 5.7 MG/DL (ref 2.4–5.7)
VLDLC SERPL-MCNC: 35 MG/DL (ref 5–40)
WBC NRBC COR # BLD AUTO: 8.08 10*3/MM3 (ref 3.4–10.8)

## 2024-01-29 PROCEDURE — 84443 ASSAY THYROID STIM HORMONE: CPT | Performed by: INTERNAL MEDICINE

## 2024-01-29 PROCEDURE — 83036 HEMOGLOBIN GLYCOSYLATED A1C: CPT | Performed by: INTERNAL MEDICINE

## 2024-01-29 PROCEDURE — 36415 COLL VENOUS BLD VENIPUNCTURE: CPT | Performed by: INTERNAL MEDICINE

## 2024-01-29 PROCEDURE — 84466 ASSAY OF TRANSFERRIN: CPT | Performed by: INTERNAL MEDICINE

## 2024-01-29 PROCEDURE — 83540 ASSAY OF IRON: CPT | Performed by: INTERNAL MEDICINE

## 2024-01-29 PROCEDURE — 80061 LIPID PANEL: CPT | Performed by: INTERNAL MEDICINE

## 2024-01-29 PROCEDURE — 84550 ASSAY OF BLOOD/URIC ACID: CPT | Performed by: INTERNAL MEDICINE

## 2024-01-29 PROCEDURE — 85025 COMPLETE CBC W/AUTO DIFF WBC: CPT | Performed by: INTERNAL MEDICINE

## 2024-01-29 PROCEDURE — 80053 COMPREHEN METABOLIC PANEL: CPT | Performed by: INTERNAL MEDICINE

## 2024-01-29 PROCEDURE — 84439 ASSAY OF FREE THYROXINE: CPT | Performed by: INTERNAL MEDICINE

## 2024-01-30 ENCOUNTER — HOSPITAL ENCOUNTER (OUTPATIENT)
Dept: INFUSION THERAPY | Facility: HOSPITAL | Age: 84
Discharge: HOME | End: 2024-01-30
Admitting: INTERNAL MEDICINE
Payer: MEDICARE

## 2024-01-30 VITALS
BODY MASS INDEX: 37.91 KG/M2 | OXYGEN SATURATION: 97 % | DIASTOLIC BLOOD PRESSURE: 59 MMHG | RESPIRATION RATE: 20 BRPM | TEMPERATURE: 98 F | WEIGHT: 227.51 LBS | HEIGHT: 65 IN | SYSTOLIC BLOOD PRESSURE: 125 MMHG | HEART RATE: 75 BPM

## 2024-01-30 DIAGNOSIS — N18.4 ANEMIA OF CHRONIC RENAL FAILURE, STAGE 4 (SEVERE): Primary | ICD-10-CM

## 2024-01-30 DIAGNOSIS — D50.9 IRON DEFICIENCY ANEMIA, UNSPECIFIED IRON DEFICIENCY ANEMIA TYPE: ICD-10-CM

## 2024-01-30 DIAGNOSIS — D63.1 ANEMIA OF CHRONIC RENAL FAILURE, STAGE 4 (SEVERE): Primary | ICD-10-CM

## 2024-01-30 PROCEDURE — 96374 THER/PROPH/DIAG INJ IV PUSH: CPT

## 2024-01-30 PROCEDURE — 25010000002 FERRIC CARBOXYMALTOSE 750 MG/15ML SOLUTION: Performed by: INTERNAL MEDICINE

## 2024-01-30 RX ADMIN — FERRIC CARBOXYMALTOSE INJECTION 750 MG: 50 INJECTION, SOLUTION INTRAVENOUS at 13:26

## 2024-02-01 ENCOUNTER — OFFICE VISIT (OUTPATIENT)
Dept: INTERNAL MEDICINE | Facility: CLINIC | Age: 84
End: 2024-02-01
Payer: MEDICARE

## 2024-02-01 VITALS
TEMPERATURE: 98.2 F | OXYGEN SATURATION: 95 % | HEIGHT: 65 IN | BODY MASS INDEX: 37.65 KG/M2 | DIASTOLIC BLOOD PRESSURE: 72 MMHG | HEART RATE: 96 BPM | SYSTOLIC BLOOD PRESSURE: 147 MMHG | WEIGHT: 226 LBS

## 2024-02-01 DIAGNOSIS — E55.9 VITAMIN D DEFICIENCY: ICD-10-CM

## 2024-02-01 DIAGNOSIS — N18.9 CHRONIC KIDNEY DISEASE, UNSPECIFIED CKD STAGE: ICD-10-CM

## 2024-02-01 DIAGNOSIS — F41.9 ANXIETY: ICD-10-CM

## 2024-02-01 DIAGNOSIS — F51.01 PRIMARY INSOMNIA: Primary | ICD-10-CM

## 2024-02-01 DIAGNOSIS — C50.919 MALIGNANT NEOPLASM OF FEMALE BREAST, UNSPECIFIED ESTROGEN RECEPTOR STATUS, UNSPECIFIED LATERALITY, UNSPECIFIED SITE OF BREAST: ICD-10-CM

## 2024-02-01 DIAGNOSIS — Z79.4 TYPE 2 DIABETES MELLITUS WITHOUT COMPLICATION, WITH LONG-TERM CURRENT USE OF INSULIN: ICD-10-CM

## 2024-02-01 DIAGNOSIS — E11.9 TYPE 2 DIABETES MELLITUS WITHOUT COMPLICATION, WITH LONG-TERM CURRENT USE OF INSULIN: ICD-10-CM

## 2024-02-01 DIAGNOSIS — D50.9 IRON DEFICIENCY ANEMIA, UNSPECIFIED IRON DEFICIENCY ANEMIA TYPE: ICD-10-CM

## 2024-02-01 DIAGNOSIS — E66.01 MORBID (SEVERE) OBESITY DUE TO EXCESS CALORIES: ICD-10-CM

## 2024-02-01 DIAGNOSIS — R74.8 ELEVATED LIVER ENZYMES: ICD-10-CM

## 2024-02-01 DIAGNOSIS — C50.411 MALIGNANT NEOPLASM OF UPPER-OUTER QUADRANT OF RIGHT FEMALE BREAST, UNSPECIFIED ESTROGEN RECEPTOR STATUS: ICD-10-CM

## 2024-02-01 DIAGNOSIS — E03.8 HYPOTHYROIDISM DUE TO HASHIMOTO'S THYROIDITIS: ICD-10-CM

## 2024-02-01 DIAGNOSIS — I48.11 LONGSTANDING PERSISTENT ATRIAL FIBRILLATION: ICD-10-CM

## 2024-02-01 DIAGNOSIS — E06.3 HYPOTHYROIDISM DUE TO HASHIMOTO'S THYROIDITIS: ICD-10-CM

## 2024-02-01 DIAGNOSIS — I10 HYPERTENSION, ESSENTIAL: ICD-10-CM

## 2024-02-01 RX ORDER — GUAIFENESIN AND DEXTROMETHORPHAN HYDROBROMIDE 600; 30 MG/1; MG/1
1 TABLET, EXTENDED RELEASE ORAL 2 TIMES DAILY PRN
Qty: 20 EACH | Refills: 0 | Status: SHIPPED | OUTPATIENT
Start: 2024-02-01

## 2024-02-01 RX ORDER — DULAGLUTIDE 0.75 MG/.5ML
0.75 INJECTION, SOLUTION SUBCUTANEOUS WEEKLY
Qty: 2 ML | Refills: 5 | Status: SHIPPED | OUTPATIENT
Start: 2024-02-01

## 2024-02-01 NOTE — PROGRESS NOTES
"CHIEF COMPLAINT/ HPI:  Hypertension (Routine follow up, Lab follow up. JUSTINA, seeing Dr Eagle. )    Patient's had 2 iron transfusions at the end of January 2024,, ordered per nephrology    Patient's been coughing dry no fevers, nonproductive,      Objective   Vital Signs  Vitals:    02/01/24 1346   BP: 147/72   Pulse: 96   Temp: 98.2 °F (36.8 °C)   SpO2: 95%   Weight: 103 kg (226 lb)   Height: 165.1 cm (65\")      Body mass index is 37.61 kg/m².  Review of Systems   Constitutional:  Negative for fever.   Respiratory:  Positive for cough.       Physical Exam  Constitutional:       General: She is not in acute distress.     Appearance: Normal appearance. She is obese.   HENT:      Head: Normocephalic.      Mouth/Throat:      Mouth: Mucous membranes are moist.   Eyes:      Conjunctiva/sclera: Conjunctivae normal.      Pupils: Pupils are equal, round, and reactive to light.   Cardiovascular:      Rate and Rhythm: Normal rate and regular rhythm.      Pulses: Normal pulses.      Heart sounds: Normal heart sounds.   Pulmonary:      Effort: Pulmonary effort is normal.      Breath sounds: Normal breath sounds.   Abdominal:      General: Bowel sounds are normal.      Palpations: Abdomen is soft.   Musculoskeletal:         General: No swelling. Normal range of motion.      Cervical back: Neck supple.   Skin:     General: Skin is warm and dry.      Coloration: Skin is not jaundiced.   Neurological:      General: No focal deficit present.      Mental Status: She is alert and oriented to person, place, and time. Mental status is at baseline.   Psychiatric:         Mood and Affect: Mood normal.         Behavior: Behavior normal.         Thought Content: Thought content normal.         Judgment: Judgment normal.        Result Review :   Lab Results   Component Value Date    PROBNP 823.1 09/13/2021     01/09/2020     CMP          8/1/2023    10:35 12/12/2023    16:18 1/29/2024    10:34   CMP   Glucose 199  143  160    BUN 45  29 "  33    Creatinine 2.42  2.22  2.21    EGFR 19.4  21.5  21.6    Sodium 138  142  140    Potassium 5.1  5.2  4.7    Chloride 106  109  108    Calcium 10.0  9.9  9.5    Total Protein 7.0   6.8    Albumin 3.9  3.7  3.7    Globulin 3.1   3.1    Total Bilirubin 0.2   <0.2    Alkaline Phosphatase 98   116    AST (SGOT) 18   16    ALT (SGPT) 15   9    Albumin/Globulin Ratio 1.3   1.2    BUN/Creatinine Ratio 18.6  13.1  14.9    Anion Gap 11.6  10.3  11.9      CBC w/diff          8/1/2023    10:35 12/12/2023    16:18 1/29/2024    10:34   CBC w/Diff   WBC 8.30  6.44  8.08    RBC 3.23  3.03  3.26    Hemoglobin 9.1  7.3  8.1    Hematocrit 27.9  24.1  25.9    MCV 86.4  79.5  79.4    MCH 28.2  24.1  24.8    MCHC 32.6  30.3  31.3    RDW 14.3  16.0  18.3    Platelets 218  231  237    Neutrophil Rel % 54.3  51.8  54.5    Immature Granulocyte Rel % 0.6  0.5  1.5    Lymphocyte Rel % 34.2  38.2  34.4    Monocyte Rel % 5.9  6.5  6.6    Eosinophil Rel % 4.9  2.8  2.8    Basophil Rel % 0.1  0.2  0.2       Lipid Panel          8/1/2023    10:35 1/29/2024    10:34   Lipid Panel   Total Cholesterol 203  165    Triglycerides 310  209    HDL Cholesterol 43  40    VLDL Cholesterol 53  35    LDL Cholesterol  107  90    LDL/HDL Ratio 2.28  2.08       Lab Results   Component Value Date    TSH 2.550 01/29/2024    TSH 3.120 08/01/2023    TSH 3.590 11/17/2022      Lab Results   Component Value Date    FREET4 1.12 01/29/2024    FREET4 1.07 08/01/2023    FREET4 1.36 11/17/2022      A1C Last 3 Results          4/3/2023    09:28 8/1/2023    10:35 1/29/2024    10:34   HGBA1C Last 3 Results   Hemoglobin A1C 6.50  6.80  7.30                        Visit Diagnoses:    ICD-10-CM ICD-9-CM   1. Primary insomnia  F51.01 307.42   2. Malignant neoplasm of female breast, unspecified estrogen receptor status, unspecified laterality, unspecified site of breast  C50.919 174.9   3. Vitamin D deficiency  E55.9 268.9   4. Chronic kidney disease, unspecified CKD stage   N18.9 585.9   5. Iron deficiency anemia, unspecified iron deficiency anemia type  D50.9 280.9   6. Hypertension, essential  I10 401.9   7. Type 2 diabetes mellitus without complication, with long-term current use of insulin  E11.9 250.00    Z79.4 V58.67   8. Hypothyroidism due to Hashimoto's thyroiditis  E03.8 244.8    E06.3 245.2   9. Morbid (severe) obesity due to excess calories  E66.01 278.01   10. Anxiety  F41.9 300.00   11. Longstanding persistent atrial fibrillation  I48.11 427.31   12. Elevated liver enzymes  R74.8 790.5   13. Malignant neoplasm of upper-outer quadrant of right female breast, unspecified estrogen receptor status  C50.411 174.4       Assessment and Plan   Diagnoses and all orders for this visit:    1. Primary insomnia (Primary)  -     Lipid Panel; Future  -     Ferritin; Future  -     Comprehensive Metabolic Panel; Future  -     CBC & Differential; Future  -     Hemoglobin A1c; Future  -     Iron Profile; Future    2. Malignant neoplasm of female breast, unspecified estrogen receptor status, unspecified laterality, unspecified site of breast  -     Lipid Panel; Future  -     Ferritin; Future  -     Comprehensive Metabolic Panel; Future  -     CBC & Differential; Future  -     Hemoglobin A1c; Future  -     Iron Profile; Future    3. Vitamin D deficiency  -     Lipid Panel; Future  -     Ferritin; Future  -     Comprehensive Metabolic Panel; Future  -     CBC & Differential; Future  -     Hemoglobin A1c; Future  -     Iron Profile; Future    4. Chronic kidney disease, unspecified CKD stage  -     Lipid Panel; Future  -     Ferritin; Future  -     Comprehensive Metabolic Panel; Future  -     CBC & Differential; Future  -     Hemoglobin A1c; Future  -     Iron Profile; Future    5. Iron deficiency anemia, unspecified iron deficiency anemia type  -     Lipid Panel; Future  -     Ferritin; Future  -     Comprehensive Metabolic Panel; Future  -     CBC & Differential; Future  -     Hemoglobin A1c;  Future  -     Iron Profile; Future    6. Hypertension, essential  -     Lipid Panel; Future  -     Ferritin; Future  -     Comprehensive Metabolic Panel; Future  -     CBC & Differential; Future  -     Hemoglobin A1c; Future  -     Iron Profile; Future    7. Type 2 diabetes mellitus without complication, with long-term current use of insulin  -     Lipid Panel; Future  -     Ferritin; Future  -     Comprehensive Metabolic Panel; Future  -     CBC & Differential; Future  -     Hemoglobin A1c; Future  -     Iron Profile; Future    8. Hypothyroidism due to Hashimoto's thyroiditis  -     Lipid Panel; Future  -     Ferritin; Future  -     Comprehensive Metabolic Panel; Future  -     CBC & Differential; Future  -     Hemoglobin A1c; Future  -     Iron Profile; Future    9. Morbid (severe) obesity due to excess calories  -     Lipid Panel; Future  -     Ferritin; Future  -     Comprehensive Metabolic Panel; Future  -     CBC & Differential; Future  -     Hemoglobin A1c; Future  -     Iron Profile; Future    10. Anxiety  -     Lipid Panel; Future  -     Ferritin; Future  -     Comprehensive Metabolic Panel; Future  -     CBC & Differential; Future  -     Hemoglobin A1c; Future  -     Iron Profile; Future    11. Longstanding persistent atrial fibrillation  -     Lipid Panel; Future  -     Ferritin; Future  -     Comprehensive Metabolic Panel; Future  -     CBC & Differential; Future  -     Hemoglobin A1c; Future  -     Iron Profile; Future    12. Elevated liver enzymes  -     Lipid Panel; Future  -     Ferritin; Future  -     Comprehensive Metabolic Panel; Future  -     CBC & Differential; Future  -     Hemoglobin A1c; Future  -     Iron Profile; Future    13. Malignant neoplasm of upper-outer quadrant of right female breast, unspecified estrogen receptor status  -     Lipid Panel; Future  -     Ferritin; Future  -     Comprehensive Metabolic Panel; Future  -     CBC & Differential; Future  -     Hemoglobin A1c; Future  -      Iron Profile; Future    Other orders  -     guaifenesin-dextromethorphan (MUCINEX DM)  MG tablet sustained-release 12 hour tablet; Take 1 tablet by mouth 2 (Two) Times a Day As Needed (Cough).  Dispense: 20 each; Refill: 0  -     Dulaglutide (Trulicity) 0.75 MG/0.5ML solution pen-injector; Inject 0.75 mg under the skin into the appropriate area as directed 1 (One) Time Per Week.  Dispense: 2 mL; Refill: 5    Cough, will hold off on antibiotics patient can use Mucinex DM, February 1, 2024    Annual wellness visit performed on April 4, 2023    Iron deficiency anemia,, hemoglobin down to 7.3 December 12, 2023, up to 8.1 January 29, 2024 got 2 iron infusions by nephrology end of January 2024------------- colonoscopy Dr. Lopez 2019 multiple polyps, patient received iron infusions July 2022 -----previously had iron infusions in 2019,     Heart murmur,----- echocardiogram shows only trace mitral regurgitation and normal ejection fraction October 2020,, repeat echo shows low normal ejection fraction 50% to 55% no regional wall motion abnormalities there was some diastolic dysfunction borderline left atrial enlargement mild to moderate mitral regurgitation but no significant valvular pathology, August 1, 2022    Subclinical hypothyroid---cont  Synthroid 0.05 mg daily     Diarrhea intermittent, uses colestipol as needed, still has her gallbladder     s/p L TKA, 4/16,     chronic A. 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 guided removal December 3, 2014 with 4 sentinel lymph nodes removed all negative,------ off anastrozole per oncology/ [facility] and stable since  Dec 2020    HTN-, continues HYDRALAZINE 50 TID AND LOSARTAN 100/25 QD , DOXAZOSIN 4 MG QHS, COREG 12.5 BID, Cardizem  mg daily    B/L SHOULDER AND L KNEE OA--     GOUT- BETTER --continue ALLOPURINOL 300 mg  daily    OBESE-MORBID    DM 2, KKN7I-5.3 January 29, 2024-----  cont Januvia 50 mg daily,, Glucovance 2.5\500 mg twice a day------- we discussed use of Trulicity risk benefits and losing weight, organ to send that prescription into the pharmacy today [facility], February 1, 2024    EYES CHECK BLOOM / [facility]NET --MARCH 2021    ELEVATED LFTS, currently normal     VIT D DEF continues replacement     ELEVATED CHOL--patient intolerant of statins discussed,    INSOMNIA, continues Ambien nightly      CKD 3 b - CREAT up to 2.6 April 3, 2023, down to 2.2 January 29, 2024,     Follow Up   No follow-ups on file.  Patient was given instructions and counseling regarding her condition or for health maintenance advice. Please see specific information pulled into the AVS if appropriate.

## 2024-02-29 ENCOUNTER — TRANSCRIBE ORDERS (OUTPATIENT)
Dept: LAB | Facility: HOSPITAL | Age: 84
End: 2024-02-29
Payer: MEDICARE

## 2024-02-29 ENCOUNTER — LAB (OUTPATIENT)
Dept: LAB | Facility: HOSPITAL | Age: 84
End: 2024-02-29
Payer: MEDICARE

## 2024-02-29 DIAGNOSIS — E55.9 VITAMIN D DEFICIENCY: ICD-10-CM

## 2024-02-29 DIAGNOSIS — N18.4 CHRONIC KIDNEY DISEASE, STAGE IV (SEVERE): ICD-10-CM

## 2024-02-29 DIAGNOSIS — M10.00 IDIOPATHIC GOUT, UNSPECIFIED CHRONICITY, UNSPECIFIED SITE: ICD-10-CM

## 2024-02-29 DIAGNOSIS — E06.3 HASHIMOTO'S DISEASE: ICD-10-CM

## 2024-02-29 DIAGNOSIS — N28.1 RENAL CYST: ICD-10-CM

## 2024-02-29 DIAGNOSIS — I48.11 LONGSTANDING PERSISTENT ATRIAL FIBRILLATION: ICD-10-CM

## 2024-02-29 DIAGNOSIS — E78.2 MIXED HYPERLIPIDEMIA: ICD-10-CM

## 2024-02-29 DIAGNOSIS — N18.4 CHRONIC KIDNEY DISEASE, STAGE IV (SEVERE): Primary | ICD-10-CM

## 2024-02-29 DIAGNOSIS — N28.89 KIDNEY MASS: ICD-10-CM

## 2024-02-29 DIAGNOSIS — D50.9 IRON DEFICIENCY ANEMIA, UNSPECIFIED IRON DEFICIENCY ANEMIA TYPE: ICD-10-CM

## 2024-02-29 DIAGNOSIS — C50.919 MALIGNANT NEOPLASM OF FEMALE BREAST, UNSPECIFIED ESTROGEN RECEPTOR STATUS, UNSPECIFIED LATERALITY, UNSPECIFIED SITE OF BREAST: ICD-10-CM

## 2024-02-29 DIAGNOSIS — E03.9 HYPOTHYROIDISM, UNSPECIFIED TYPE: ICD-10-CM

## 2024-02-29 DIAGNOSIS — I10 HYPERTENSION, ESSENTIAL: ICD-10-CM

## 2024-02-29 LAB
ALBUMIN SERPL-MCNC: 3.7 G/DL (ref 3.5–5.2)
ANION GAP SERPL CALCULATED.3IONS-SCNC: 12 MMOL/L (ref 5–15)
BACTERIA UR QL AUTO: ABNORMAL /HPF
BASOPHILS # BLD AUTO: 0.02 10*3/MM3 (ref 0–0.2)
BASOPHILS NFR BLD AUTO: 0.3 % (ref 0–1.5)
BILIRUB UR QL STRIP: NEGATIVE
BUN SERPL-MCNC: 29 MG/DL (ref 8–23)
BUN/CREAT SERPL: 14.5 (ref 7–25)
CALCIUM SPEC-SCNC: 9.3 MG/DL (ref 8.6–10.5)
CHLORIDE SERPL-SCNC: 109 MMOL/L (ref 98–107)
CLARITY UR: CLEAR
CO2 SERPL-SCNC: 20 MMOL/L (ref 22–29)
COLOR UR: YELLOW
CREAT SERPL-MCNC: 2 MG/DL (ref 0.57–1)
CREAT UR-MCNC: 165.5 MG/DL
DEPRECATED RDW RBC AUTO: 65.9 FL (ref 37–54)
EGFRCR SERPLBLD CKD-EPI 2021: 24.4 ML/MIN/1.73
EOSINOPHIL # BLD AUTO: 0.2 10*3/MM3 (ref 0–0.4)
EOSINOPHIL NFR BLD AUTO: 3.1 % (ref 0.3–6.2)
ERYTHROCYTE [DISTWIDTH] IN BLOOD BY AUTOMATED COUNT: 21.6 % (ref 12.3–15.4)
FERRITIN SERPL-MCNC: 517 NG/ML (ref 13–150)
GLUCOSE SERPL-MCNC: 206 MG/DL (ref 65–99)
GLUCOSE UR STRIP-MCNC: NEGATIVE MG/DL
HCT VFR BLD AUTO: 31.8 % (ref 34–46.6)
HGB BLD-MCNC: 10 G/DL (ref 12–15.9)
HGB UR QL STRIP.AUTO: NEGATIVE
HYALINE CASTS UR QL AUTO: ABNORMAL /LPF
IMM GRANULOCYTES # BLD AUTO: 0.03 10*3/MM3 (ref 0–0.05)
IMM GRANULOCYTES NFR BLD AUTO: 0.5 % (ref 0–0.5)
IRON 24H UR-MRATE: 67 MCG/DL (ref 37–145)
IRON SATN MFR SERPL: 24 % (ref 20–50)
KETONES UR QL STRIP: NEGATIVE
LEUKOCYTE ESTERASE UR QL STRIP.AUTO: ABNORMAL
LYMPHOCYTES # BLD AUTO: 2.16 10*3/MM3 (ref 0.7–3.1)
LYMPHOCYTES NFR BLD AUTO: 33.5 % (ref 19.6–45.3)
MCH RBC QN AUTO: 26.8 PG (ref 26.6–33)
MCHC RBC AUTO-ENTMCNC: 31.4 G/DL (ref 31.5–35.7)
MCV RBC AUTO: 85.3 FL (ref 79–97)
MONOCYTES # BLD AUTO: 0.42 10*3/MM3 (ref 0.1–0.9)
MONOCYTES NFR BLD AUTO: 6.5 % (ref 5–12)
NEUTROPHILS NFR BLD AUTO: 3.61 10*3/MM3 (ref 1.7–7)
NEUTROPHILS NFR BLD AUTO: 56.1 % (ref 42.7–76)
NITRITE UR QL STRIP: NEGATIVE
NRBC BLD AUTO-RTO: 0 /100 WBC (ref 0–0.2)
PH UR STRIP.AUTO: 5.5 [PH] (ref 5–8)
PHOSPHATE SERPL-MCNC: 2.9 MG/DL (ref 2.5–4.5)
PLATELET # BLD AUTO: 205 10*3/MM3 (ref 140–450)
PMV BLD AUTO: 9.7 FL (ref 6–12)
POTASSIUM SERPL-SCNC: 4.3 MMOL/L (ref 3.5–5.2)
PROT ?TM UR-MCNC: 28.1 MG/DL
PROT UR QL STRIP: ABNORMAL
PROT/CREAT UR: 0.17 MG/G{CREAT}
RBC # BLD AUTO: 3.73 10*6/MM3 (ref 3.77–5.28)
RBC # UR STRIP: ABNORMAL /HPF
REF LAB TEST METHOD: ABNORMAL
SODIUM SERPL-SCNC: 141 MMOL/L (ref 136–145)
SP GR UR STRIP: 1.02 (ref 1–1.03)
SQUAMOUS #/AREA URNS HPF: ABNORMAL /HPF
TIBC SERPL-MCNC: 279 MCG/DL (ref 298–536)
TRANSFERRIN SERPL-MCNC: 187 MG/DL (ref 200–360)
UROBILINOGEN UR QL STRIP: ABNORMAL
WBC # UR STRIP: ABNORMAL /HPF
WBC NRBC COR # BLD AUTO: 6.44 10*3/MM3 (ref 3.4–10.8)

## 2024-02-29 PROCEDURE — 83540 ASSAY OF IRON: CPT

## 2024-02-29 PROCEDURE — 80069 RENAL FUNCTION PANEL: CPT

## 2024-02-29 PROCEDURE — 84466 ASSAY OF TRANSFERRIN: CPT

## 2024-02-29 PROCEDURE — 36415 COLL VENOUS BLD VENIPUNCTURE: CPT

## 2024-02-29 PROCEDURE — 81001 URINALYSIS AUTO W/SCOPE: CPT

## 2024-02-29 PROCEDURE — 84156 ASSAY OF PROTEIN URINE: CPT

## 2024-02-29 PROCEDURE — 82728 ASSAY OF FERRITIN: CPT

## 2024-02-29 PROCEDURE — 85025 COMPLETE CBC W/AUTO DIFF WBC: CPT

## 2024-02-29 PROCEDURE — 82570 ASSAY OF URINE CREATININE: CPT

## 2024-04-04 ENCOUNTER — APPOINTMENT (OUTPATIENT)
Dept: CT IMAGING | Facility: HOSPITAL | Age: 84
End: 2024-04-04
Payer: MEDICARE

## 2024-04-04 ENCOUNTER — HOSPITAL ENCOUNTER (EMERGENCY)
Facility: HOSPITAL | Age: 84
Discharge: HOME | End: 2024-04-04
Attending: EMERGENCY MEDICINE
Payer: MEDICARE

## 2024-04-04 VITALS
OXYGEN SATURATION: 97 % | SYSTOLIC BLOOD PRESSURE: 139 MMHG | HEIGHT: 66 IN | HEART RATE: 92 BPM | RESPIRATION RATE: 18 BRPM | BODY MASS INDEX: 36.42 KG/M2 | TEMPERATURE: 97.5 F | WEIGHT: 226.63 LBS | DIASTOLIC BLOOD PRESSURE: 74 MMHG

## 2024-04-04 DIAGNOSIS — E11.65 HYPERGLYCEMIA DUE TO DIABETES MELLITUS: ICD-10-CM

## 2024-04-04 DIAGNOSIS — K61.0 PERIANAL ABSCESS: Primary | ICD-10-CM

## 2024-04-04 DIAGNOSIS — D64.9 CHRONIC ANEMIA: ICD-10-CM

## 2024-04-04 LAB
ALBUMIN SERPL-MCNC: 3.4 G/DL (ref 3.5–5.2)
ALBUMIN/GLOB SERPL: 1.1 G/DL
ALP SERPL-CCNC: 151 U/L (ref 39–117)
ALT SERPL W P-5'-P-CCNC: 10 U/L (ref 1–33)
ANION GAP SERPL CALCULATED.3IONS-SCNC: 12.6 MMOL/L (ref 5–15)
AST SERPL-CCNC: 12 U/L (ref 1–32)
BASOPHILS # BLD AUTO: 0.01 10*3/MM3 (ref 0–0.2)
BASOPHILS NFR BLD AUTO: 0.1 % (ref 0–1.5)
BILIRUB SERPL-MCNC: 0.3 MG/DL (ref 0–1.2)
BUN SERPL-MCNC: 35 MG/DL (ref 8–23)
BUN/CREAT SERPL: 15.4 (ref 7–25)
CALCIUM SPEC-SCNC: 9.5 MG/DL (ref 8.6–10.5)
CHLORIDE SERPL-SCNC: 106 MMOL/L (ref 98–107)
CO2 SERPL-SCNC: 19.4 MMOL/L (ref 22–29)
CREAT SERPL-MCNC: 2.27 MG/DL (ref 0.57–1)
DEPRECATED RDW RBC AUTO: 59.5 FL (ref 37–54)
EGFRCR SERPLBLD CKD-EPI 2021: 20.9 ML/MIN/1.73
EOSINOPHIL # BLD AUTO: 0.17 10*3/MM3 (ref 0–0.4)
EOSINOPHIL NFR BLD AUTO: 1.9 % (ref 0.3–6.2)
ERYTHROCYTE [DISTWIDTH] IN BLOOD BY AUTOMATED COUNT: 18.7 % (ref 12.3–15.4)
GLOBULIN UR ELPH-MCNC: 3 GM/DL
GLUCOSE BLDC GLUCOMTR-MCNC: 241 MG/DL (ref 70–99)
GLUCOSE SERPL-MCNC: 347 MG/DL (ref 65–99)
HCT VFR BLD AUTO: 29.8 % (ref 34–46.6)
HGB BLD-MCNC: 9.8 G/DL (ref 12–15.9)
IMM GRANULOCYTES # BLD AUTO: 0.05 10*3/MM3 (ref 0–0.05)
IMM GRANULOCYTES NFR BLD AUTO: 0.6 % (ref 0–0.5)
LYMPHOCYTES # BLD AUTO: 1.7 10*3/MM3 (ref 0.7–3.1)
LYMPHOCYTES NFR BLD AUTO: 18.8 % (ref 19.6–45.3)
MCH RBC QN AUTO: 28.5 PG (ref 26.6–33)
MCHC RBC AUTO-ENTMCNC: 32.9 G/DL (ref 31.5–35.7)
MCV RBC AUTO: 86.6 FL (ref 79–97)
MONOCYTES # BLD AUTO: 0.55 10*3/MM3 (ref 0.1–0.9)
MONOCYTES NFR BLD AUTO: 6.1 % (ref 5–12)
NEUTROPHILS NFR BLD AUTO: 6.58 10*3/MM3 (ref 1.7–7)
NEUTROPHILS NFR BLD AUTO: 72.5 % (ref 42.7–76)
NRBC BLD AUTO-RTO: 0 /100 WBC (ref 0–0.2)
PLATELET # BLD AUTO: 178 10*3/MM3 (ref 140–450)
PMV BLD AUTO: 10 FL (ref 6–12)
POTASSIUM SERPL-SCNC: 4.5 MMOL/L (ref 3.5–5.2)
PROT SERPL-MCNC: 6.4 G/DL (ref 6–8.5)
RBC # BLD AUTO: 3.44 10*6/MM3 (ref 3.77–5.28)
SODIUM SERPL-SCNC: 138 MMOL/L (ref 136–145)
WBC NRBC COR # BLD AUTO: 9.06 10*3/MM3 (ref 3.4–10.8)

## 2024-04-04 PROCEDURE — 96365 THER/PROPH/DIAG IV INF INIT: CPT

## 2024-04-04 PROCEDURE — 80053 COMPREHEN METABOLIC PANEL: CPT | Performed by: REGISTERED NURSE

## 2024-04-04 PROCEDURE — 85025 COMPLETE CBC W/AUTO DIFF WBC: CPT | Performed by: REGISTERED NURSE

## 2024-04-04 PROCEDURE — 72192 CT PELVIS W/O DYE: CPT

## 2024-04-04 PROCEDURE — 87070 CULTURE OTHR SPECIMN AEROBIC: CPT | Performed by: REGISTERED NURSE

## 2024-04-04 PROCEDURE — 99284 EMERGENCY DEPT VISIT MOD MDM: CPT

## 2024-04-04 PROCEDURE — 87205 SMEAR GRAM STAIN: CPT | Performed by: REGISTERED NURSE

## 2024-04-04 PROCEDURE — 82948 REAGENT STRIP/BLOOD GLUCOSE: CPT | Performed by: REGISTERED NURSE

## 2024-04-04 PROCEDURE — 25010000002 AMPICILLIN-SULBACTAM PER 1.5 G: Performed by: REGISTERED NURSE

## 2024-04-04 PROCEDURE — 25810000003 SODIUM CHLORIDE 0.9 % SOLUTION: Performed by: REGISTERED NURSE

## 2024-04-04 RX ORDER — LIDOCAINE HYDROCHLORIDE AND EPINEPHRINE 10; 10 MG/ML; UG/ML
10 INJECTION, SOLUTION INFILTRATION; PERINEURAL ONCE
Status: COMPLETED | OUTPATIENT
Start: 2024-04-04 | End: 2024-04-04

## 2024-04-04 RX ORDER — DOXYCYCLINE HYCLATE 100 MG/1
100 TABLET, DELAYED RELEASE ORAL 2 TIMES DAILY
Qty: 20 TABLET | Refills: 0 | Status: SHIPPED | OUTPATIENT
Start: 2024-04-04

## 2024-04-04 RX ADMIN — LIDOCAINE HYDROCHLORIDE,EPINEPHRINE BITARTRATE 10 ML: 10; .01 INJECTION, SOLUTION INFILTRATION; PERINEURAL at 11:17

## 2024-04-04 RX ADMIN — SODIUM CHLORIDE 1000 ML: 9 INJECTION, SOLUTION INTRAVENOUS at 09:05

## 2024-04-04 RX ADMIN — AMPICILLIN AND SULBACTAM 3 G: 2; 1 INJECTION, POWDER, FOR SOLUTION INTRAVENOUS at 11:16

## 2024-04-04 NOTE — ED PROVIDER NOTES
Time: 7:54 AM EDT  Date of encounter:  4/4/2024  Independent Historian/Clinical History and Information was obtained by:   Patient    History is limited by: N/A    Chief Complaint: Boil      History of Present Illness:  Patient is a 83 y.o. year old female who presents to the emergency department accompanied by her son for evaluation of boil on her perineum.  Patient states she noticed that 3 days ago and it has progressively worsened.  She denies fever/chills.  She reports an episode of vomiting last night but thinks it was due to something she ate.    HPI    Patient Care Team  Primary Care Provider: Shay Ly MD    Past Medical History:     No Known Allergies  Past Medical History:   Diagnosis Date    A-fib     Acute gout     Anxiety     Bilateral shoulder pain     Depression     Essential (primary) hypertension     High cholesterol     Insomnia, unspecified     Kidney mass     Kidney stones     Mixed hyperlipidemia     Obesity     Pulmonary nodule     TIA (transient ischemic attack)     Type 2 diabetes mellitus without complication     SINCE AGE 60-SUGARS -140    Urge incontinence     Urinary incontinence     Vitamin D deficiency, unspecified      Past Surgical History:   Procedure Laterality Date    BREAST BIOPSY Left 10/2014    BREAST LUMPECTOMY  2014    KNEE ARTHROPLASTY Right 2008    OTHER SURGICAL HISTORY Right     EAR SURGERY    TOTAL KNEE ARTHROPLASTY Left 03/2016    URETERAL STENT INSERTION      Stent placement for kidney stones     Family History   Problem Relation Age of Onset    Breast cancer Sister 59       Home Medications:  Prior to Admission medications    Medication Sig Start Date End Date Taking? Authorizing Provider   albuterol sulfate HFA (Ventolin HFA) 108 (90 Base) MCG/ACT inhaler Inhale 2 puffs Every 4 (Four) Hours As Needed for Wheezing or Shortness of Air. 4/5/23   Shay Ly MD   allopurinol (ZYLOPRIM) 300 MG tablet Take 1 tablet by mouth Daily. 4/5/23    Shay Ly MD   apixaban (ELIQUIS) 5 MG tablet tablet Take 1 tablet by mouth 2 (Two) Times a Day. 7/12/23   Shay Ly MD   carvedilol (COREG) 12.5 MG tablet Take 1 tablet by mouth 2 (Two) Times a Day With Meals. 7/12/23   Shay Ly MD   Cholecalciferol (Vitamin D) 50 MCG (2000 UT) capsule Take 1 capsule by mouth Daily. 4/5/23   Shay Ly MD   colestipol (COLESTID) 1 g tablet Take 1 tablet by mouth 2 (Two) Times a Day. 8/1/23   Shay Ly MD   dilTIAZem (TIAZAC) 180 MG 24 hr capsule Take 1 capsule by mouth Daily. 4/5/23   Shay Ly MD   doxazosin (CARDURA) 8 MG tablet Take 1 tablet by mouth Every Night. 10/10/23   Shay Ly MD   Dulaglutide (Trulicity) 0.75 MG/0.5ML solution pen-injector Inject 0.75 mg under the skin into the appropriate area as directed 1 (One) Time Per Week. 2/1/24   Shay Ly MD   escitalopram (Lexapro) 5 MG tablet Take 1 tablet by mouth Daily. 4/5/23   Shay Ly MD   Fluticasone-Umeclidin-Vilant (TRELEGY) 100-62.5-25 MCG/ACT inhaler Inhale 1 puff Daily. 4/5/23   Shay Ly MD   glucose blood test strip 1 each by Other route As Needed (as needed). Use as instructed 4/5/23   Shay Ly MD   glyBURIDE-metFORMIN (GLUCOVANCE) 2.5-500 MG per tablet Take 1 tablet by mouth 2 (Two) Times a Day With Meals. 10/13/23   Shay Ly MD   guaifenesin-dextromethorphan (MUCINEX DM)  MG tablet sustained-release 12 hour tablet Take 1 tablet by mouth 2 (Two) Times a Day As Needed (Cough). 2/1/24   Shay Ly MD   hydrALAZINE (APRESOLINE) 25 MG tablet Take 1 tablet by mouth 3 (Three) Times a Day. 10/10/23   Shay Ly MD   Januvia 50 MG tablet Take 1 tablet by mouth Daily. 7/12/23   Shay Ly MD   levothyroxine (SYNTHROID, LEVOTHROID) 50 MCG tablet Take 1 tablet by mouth Every Morning. 4/5/23   Malachi  "Shay SHEPPARD MD   losartan-hydrochlorothiazide (Hyzaar) 100-25 MG per tablet Take 1 tablet by mouth Daily. 10/13/23   Shay Ly MD   zolpidem (AMBIEN) 5 MG tablet Take 1 tablet by mouth At Night As Needed for Sleep. for sleep 1/22/24   Shay Ly MD        Social History:   Social History     Tobacco Use    Smoking status: Never    Smokeless tobacco: Never   Vaping Use    Vaping status: Never Used   Substance Use Topics    Alcohol use: No    Drug use: No         Review of Systems:  Review of Systems   Constitutional:  Negative for chills and fever.   HENT:  Negative for congestion, ear pain and sore throat.    Eyes:  Negative for pain.   Respiratory:  Negative for cough, chest tightness and shortness of breath.    Cardiovascular:  Negative for chest pain.   Gastrointestinal:  Negative for abdominal pain, diarrhea, nausea and vomiting.   Genitourinary:  Negative for flank pain and hematuria.   Musculoskeletal:  Negative for joint swelling.   Skin:  Positive for wound. Negative for pallor.   Neurological:  Negative for seizures and headaches.   All other systems reviewed and are negative.       Physical Exam:  /60 (BP Location: Right arm, Patient Position: Lying)   Pulse 90   Temp 97.5 °F (36.4 °C) (Oral)   Resp 18   Ht 166.4 cm (65.5\")   Wt 103 kg (226 lb 10.1 oz)   SpO2 95%   BMI 37.14 kg/m²     Physical Exam  Vitals and nursing note reviewed.   Constitutional:       General: She is not in acute distress.     Appearance: Normal appearance. She is not ill-appearing or toxic-appearing.   HENT:      Head: Normocephalic and atraumatic.      Mouth/Throat:      Mouth: Mucous membranes are moist.   Eyes:      General: No scleral icterus.  Cardiovascular:      Rate and Rhythm: Normal rate and regular rhythm.      Pulses:           Radial pulses are 2+ on the right side and 2+ on the left side.      Heart sounds: Normal heart sounds.   Pulmonary:      Effort: Pulmonary effort is " normal. No respiratory distress.      Breath sounds: Normal breath sounds. No wheezing or rhonchi.   Abdominal:      General: Abdomen is protuberant. Bowel sounds are normal.      Palpations: Abdomen is soft.      Tenderness: There is no abdominal tenderness.   Genitourinary:      Musculoskeletal:         General: Normal range of motion.      Cervical back: Normal range of motion and neck supple.   Skin:     General: Skin is warm and dry.   Neurological:      Mental Status: She is alert and oriented to person, place, and time. Mental status is at baseline.                  Procedures:  Incision & Drainage    Date/Time: 4/4/2024 12:30 PM    Performed by: Ora Gonzalez APRN  Authorized by: Shamar Browning MD    Consent:     Consent obtained:  Verbal    Consent given by:  Patient    Risks, benefits, and alternatives were discussed: yes      Risks discussed:  Bleeding, damage to other organs, incomplete drainage, infection and pain    Alternatives discussed:  No treatment, observation and alternative treatment  Universal protocol:     Patient identity confirmed:  Verbally with patient  Location:     Type:  Abscess    Location:  Anogenital    Anogenital location:  Perianal  Pre-procedure details:     Skin preparation:  Povidone-iodine  Sedation:     Sedation type:  None  Anesthesia:     Anesthesia method:  Local infiltration    Local anesthetic:  Lidocaine 1% WITH epi  Procedure type:     Complexity:  Simple  Procedure details:     Incision types:  Single straight    Incision depth:  Dermal    Wound management:  Probed and deloculated and irrigated with saline    Drainage:  Purulent and bloody    Drainage amount:  Moderate    Wound treatment:  Wound left open    Packing materials:  None  Post-procedure details:     Procedure completion:  Tolerated well, no immediate complications        Medical Decision Making:      Comorbidities that affect care:    Atrial Fibrillation, Diabetes, Hypertension, Obesity    External  Notes reviewed:          The following orders were placed and all results were independently analyzed by me:  No orders of the defined types were placed in this encounter.      Medications Given in the Emergency Department:  Medications - No data to display     ED Course:    ED Course as of 04/04/24 1140   Thu Apr 04, 2024   1027 CT Pelvis Without Contrast  2.1 cm fluid collection suggesting perianal abscess, no subcu air [CW]      ED Course User Index  [CW] Ora Gonzalez APRN       Labs:    Lab Results (last 24 hours)       ** No results found for the last 24 hours. **             Imaging:    No Radiology Exams Resulted Within Past 24 Hours      Differential Diagnosis and Discussion:    Abscess: Differential diagnosis for an abscess includes but is not limited to bacterial or fungal infections, foreign body reactions, malignancies, and autoimmune or inflammatory conditions.        MDM  Number of Diagnoses or Management Options  Chronic anemia  Hyperglycemia due to diabetes mellitus  Perianal abscess  Diagnosis management comments: CT scan showed a simple abscess.  The patient´s CBC that was reviewed and interpreted by me shows no abnormalities of critical concern. Of note, there is no anemia requiring a blood transfusion and the platelet count is acceptable.  She has chronic anemia that is being managed by PCP.  The patient´s CMP that was reviewed and interpreted by me shows no abnormalities of critical concern. Of note, the patient´s sodium and potassium are acceptable. The patient´s liver enzymes are unremarkable. The patient´s renal function is at baseline. The patient has a normal anion gap.  Patient's blood glucose was elevated, patient had not taken her morning medications.  Blood glucose was improved after IV fluids.  Patient was given a first dose of antibiotic in the ED.  Patient reports significant decrease of pain after I&D.  Wound culture was sent.  Patient was given home self-care instructions as  well as return precautions.  Vital signs were stable in the ED.  The patient was afebrile. I do not believe that the patient has an acute emergency medical condition requiring additional emergency management at this time. The patient is currently stable for outpatient treatment and continuation of care. Important signs and symptoms that would warrant return to the emergency department were reviewed. The patient was provided the opportunity to ask questions. All questions were addressed and the patient was discharged from the ED. The patient demonstrated understanding and agreed to plan.         Amount and/or Complexity of Data Reviewed  Clinical lab tests: reviewed and ordered  Tests in the radiology section of CPT®: reviewed and ordered  Decide to obtain previous medical records or to obtain history from someone other than the patient: yes    Risk of Complications, Morbidity, and/or Mortality  Presenting problems: moderate  Diagnostic procedures: low  Management options: minimal    Patient Progress  Patient progress: stable                 Patient Care Considerations:          Consultants/Shared Management Plan:        Social Determinants of Health:          Disposition and Care Coordination:            Final diagnoses:   None        ED Disposition       None            This medical record created using voice recognition software.             Ora Gonzalez, CAROLIN  04/04/24 1377

## 2024-04-04 NOTE — DISCHARGE INSTRUCTIONS
Take antibiotic as prescribed until complete.  Wash your wound twice daily and pat dry.  Be sure to keep it very clean.  Have your wound rechecked by your PCP on Monday.    Please check your blood sugar and taking medications as prescribed.    Return for worsening symptoms such as development of fever greater than 100.5, intractable vomiting, body aches, worsening pain, or any new concerns.

## 2024-04-07 LAB
BACTERIA SPEC AEROBE CULT: NORMAL
GRAM STN SPEC: NORMAL
GRAM STN SPEC: NORMAL

## 2024-04-09 RX ORDER — DULAGLUTIDE 0.75 MG/.5ML
0.75 INJECTION, SOLUTION SUBCUTANEOUS WEEKLY
Qty: 2 ML | Refills: 5 | Status: SHIPPED | OUTPATIENT
Start: 2024-04-09

## 2024-04-09 NOTE — TELEPHONE ENCOUNTER
Caller: Luzma Dick    Relationship: Self    Best call back number: [phone]    Requested Prescriptions:   Requested Prescriptions     Pending Prescriptions Disp Refills    Fluticasone-Umeclidin-Vilant (TRELEGY) 100-62.5-25 MCG/ACT inhaler 3 each 3     Sig: Inhale 1 puff Daily.    Dulaglutide (Trulicity) 0.75 MG/0.5ML solution pen-injector 2 mL 5     Sig: Inject 0.75 mg under the skin into the appropriate area as directed 1 (One) Time Per Week.        Pharmacy where request should be sent: [facility] [address] [facility] [phone] [facility] [phone]      Last office visit with prescribing clinician: 2/1/2024   Last telemedicine visit with prescribing clinician: Visit date not found   Next office visit with prescribing clinician: 6/3/2024     Additional details provided by patient: PATIENT IS NEEDING THESE TWO MEDICATIONS SENT TO [facility].     Does the patient have less than a 3 day supply:  [x] Yes  [] No    Would you like a call back once the refill request has been completed: [] Yes [x] No    If the office needs to give you a call back, can they leave a voicemail: [] Yes [x] No    Shelbi Glover Rep   04/09/24 13:39 EDT

## 2024-04-22 RX ORDER — GLYBURIDE-METFORMIN HYDROCHLORIDE 2.5; 5 MG/1; MG/1
1 TABLET ORAL 2 TIMES DAILY WITH MEALS
Qty: 180 TABLET | Refills: 3 | Status: SHIPPED | OUTPATIENT
Start: 2024-04-22

## 2024-04-22 RX ORDER — LOSARTAN POTASSIUM AND HYDROCHLOROTHIAZIDE 25; 100 MG/1; MG/1
1 TABLET ORAL DAILY
Qty: 90 TABLET | Refills: 3 | Status: SHIPPED | OUTPATIENT
Start: 2024-04-22

## 2024-04-22 RX ORDER — LEVOTHYROXINE SODIUM 0.05 MG/1
50 TABLET ORAL
Qty: 90 TABLET | Refills: 3 | Status: SHIPPED | OUTPATIENT
Start: 2024-04-22

## 2024-04-22 RX ORDER — DILTIAZEM HYDROCHLORIDE 180 MG/1
180 CAPSULE, EXTENDED RELEASE ORAL DAILY
Qty: 90 CAPSULE | Refills: 3 | Status: SHIPPED | OUTPATIENT
Start: 2024-04-22

## 2024-04-22 NOTE — TELEPHONE ENCOUNTER
Caller: Mayelin Luzma J    Relationship: Self    Best call back number: [phone]     Requested Prescriptions:   Requested Prescriptions     Pending Prescriptions Disp Refills    dilTIAZem (TIAZAC) 180 MG 24 hr capsule 90 capsule 3     Sig: Take 1 capsule by mouth Daily.    levothyroxine (SYNTHROID, LEVOTHROID) 50 MCG tablet 90 tablet 3     Sig: Take 1 tablet by mouth Every Morning.    losartan-hydrochlorothiazide (Hyzaar) 100-25 MG per tablet 90 tablet 3     Sig: Take 1 tablet by mouth Daily.    glyBURIDE-metFORMIN (GLUCOVANCE) 2.5-500 MG per tablet 180 tablet 3     Sig: Take 1 tablet by mouth 2 (Two) Times a Day With Meals.        Pharmacy where request should be sent: [facility] [phone] [place], [place] [phone]      Last office visit with prescribing clinician: 2/1/2024   Last telemedicine visit with prescribing clinician: Visit date not found   Next office visit with prescribing clinician: 6/3/2024     Additional details provided by patient: PATIENT IS NEEDING A REFILL.     Does the patient have less than a 3 day supply:  [x] Yes  [] No    Would you like a call back once the refill request has been completed: [x] Yes [] No    If the office needs to give you a call back, can they leave a voicemail: [x] Yes [] No    Shelbi Trent Rep   04/22/24 14:23 EDT

## 2024-05-07 RX ORDER — CETIRIZINE HYDROCHLORIDE 10 MG/1
10 TABLET ORAL DAILY
Qty: 10 TABLET | Refills: 0 | OUTPATIENT
Start: 2024-05-07 | End: 2024-05-17

## 2024-05-07 RX ORDER — METHYLPREDNISOLONE 4 MG/1
TABLET ORAL
Qty: 21 TABLET | Refills: 0 | OUTPATIENT
Start: 2024-05-07

## 2024-05-07 RX ORDER — FAMOTIDINE 20 MG
20 TABLET ORAL 2 TIMES DAILY
Qty: 40 TABLET | Refills: 0 | OUTPATIENT
Start: 2024-05-07 | End: 2024-05-27

## 2024-05-13 ENCOUNTER — TELEPHONE (OUTPATIENT)
Dept: INTERNAL MEDICINE | Age: 84
End: 2024-05-13

## 2024-05-13 NOTE — TELEPHONE ENCOUNTER
Caller: LIGIA THORNE    Best call back number: [phone]     What is the best time to reach you: ANY    Who are you requesting to speak with (clinical staff, provider,  specific staff member): CLINICAL STAFF    What was the call regarding: PATIENTS DAUGHTER WOULD LIKE A CALL REGARDING THE AUGMENTIN ISSUE THEY HAD LAST WEEK ON 5/6/24

## 2024-05-14 NOTE — TELEPHONE ENCOUNTER
Patient states her foot is much better yesterday and today. Advised she needs to be seen if it comes back.

## 2024-05-22 RX ORDER — DULAGLUTIDE 0.75 MG/.5ML
0.75 INJECTION, SOLUTION SUBCUTANEOUS WEEKLY
Qty: 2 ML | Refills: 5 | OUTPATIENT
Start: 2024-05-22

## 2024-05-22 NOTE — TELEPHONE ENCOUNTER
Caller: MayelinLuzma perez    Relationship: Self    Best call back number: [phone]     Requested Prescriptions:   Requested Prescriptions     Pending Prescriptions Disp Refills    Dulaglutide (Trulicity) 0.75 MG/0.5ML solution pen-injector 2 mL 5     Sig: Inject 0.75 mg under the skin into the appropriate area as directed 1 (One) Time Per Week.        Pharmacy where request should be sent: [facility] [phone] [place], [place] [phone]      Last office visit with prescribing clinician: 2/1/2024   Last telemedicine visit with prescribing clinician: Visit date not found   Next office visit with prescribing clinician: 6/3/2024     Additional details provided by patient: PATIENT IS NEEDING A REFILL.     Does the patient have less than a 3 day supply:  [x] Yes  [] No    Would you like a call back once the refill request has been completed: [x] Yes [] No    If the office needs to give you a call back, can they leave a voicemail: [x] Yes [] No    Shelbi Trent Rep   05/22/24 14:48 EDT

## 2024-05-30 ENCOUNTER — TRANSCRIBE ORDERS (OUTPATIENT)
Dept: LAB | Facility: HOSPITAL | Age: 84
End: 2024-05-30
Payer: MEDICARE

## 2024-05-30 ENCOUNTER — LAB (OUTPATIENT)
Dept: LAB | Facility: HOSPITAL | Age: 84
End: 2024-05-30
Payer: MEDICARE

## 2024-05-30 DIAGNOSIS — R74.8 ELEVATED LIVER ENZYMES: ICD-10-CM

## 2024-05-30 DIAGNOSIS — F41.9 ANXIETY: ICD-10-CM

## 2024-05-30 DIAGNOSIS — E06.3 CHRONIC LYMPHOCYTIC THYROIDITIS: ICD-10-CM

## 2024-05-30 DIAGNOSIS — M10.00 IDIOPATHIC GOUT, UNSPECIFIED CHRONICITY, UNSPECIFIED SITE: ICD-10-CM

## 2024-05-30 DIAGNOSIS — I10 HYPERTENSION, UNSPECIFIED TYPE: Primary | ICD-10-CM

## 2024-05-30 DIAGNOSIS — C50.919 MALIGNANT NEOPLASM OF FEMALE BREAST, UNSPECIFIED ESTROGEN RECEPTOR STATUS, UNSPECIFIED LATERALITY, UNSPECIFIED SITE OF BREAST: ICD-10-CM

## 2024-05-30 DIAGNOSIS — E06.3 HYPOTHYROIDISM DUE TO HASHIMOTO'S THYROIDITIS: ICD-10-CM

## 2024-05-30 DIAGNOSIS — E55.9 VITAMIN D DEFICIENCY: ICD-10-CM

## 2024-05-30 DIAGNOSIS — F51.01 PRIMARY INSOMNIA: ICD-10-CM

## 2024-05-30 DIAGNOSIS — E03.9 HYPOTHYROIDISM, UNSPECIFIED TYPE: ICD-10-CM

## 2024-05-30 DIAGNOSIS — E78.2 MIXED HYPERLIPIDEMIA: ICD-10-CM

## 2024-05-30 DIAGNOSIS — E66.01 MORBID (SEVERE) OBESITY DUE TO EXCESS CALORIES: ICD-10-CM

## 2024-05-30 DIAGNOSIS — N28.1 ACQUIRED CYST OF KIDNEY: ICD-10-CM

## 2024-05-30 DIAGNOSIS — I10 HYPERTENSION, ESSENTIAL: ICD-10-CM

## 2024-05-30 DIAGNOSIS — N28.89 URETERAL FISTULA: ICD-10-CM

## 2024-05-30 DIAGNOSIS — I48.11 LONGSTANDING PERSISTENT ATRIAL FIBRILLATION: ICD-10-CM

## 2024-05-30 DIAGNOSIS — E03.8 HYPOTHYROIDISM DUE TO HASHIMOTO'S THYROIDITIS: ICD-10-CM

## 2024-05-30 DIAGNOSIS — N18.9 CHRONIC KIDNEY DISEASE, UNSPECIFIED CKD STAGE: ICD-10-CM

## 2024-05-30 DIAGNOSIS — E11.9 TYPE 2 DIABETES MELLITUS WITHOUT COMPLICATION, WITH LONG-TERM CURRENT USE OF INSULIN: ICD-10-CM

## 2024-05-30 DIAGNOSIS — C50.411 MALIGNANT NEOPLASM OF UPPER-OUTER QUADRANT OF RIGHT FEMALE BREAST, UNSPECIFIED ESTROGEN RECEPTOR STATUS: ICD-10-CM

## 2024-05-30 DIAGNOSIS — Z79.4 TYPE 2 DIABETES MELLITUS WITHOUT COMPLICATION, WITH LONG-TERM CURRENT USE OF INSULIN: ICD-10-CM

## 2024-05-30 DIAGNOSIS — D50.9 IRON DEFICIENCY ANEMIA, UNSPECIFIED IRON DEFICIENCY ANEMIA TYPE: ICD-10-CM

## 2024-05-30 DIAGNOSIS — I10 HYPERTENSION, UNSPECIFIED TYPE: ICD-10-CM

## 2024-05-30 LAB
25(OH)D3 SERPL-MCNC: 29.8 NG/ML (ref 30–100)
ALBUMIN SERPL-MCNC: 3.9 G/DL (ref 3.5–5.2)
ALBUMIN/GLOB SERPL: 1.4 G/DL
ALP SERPL-CCNC: 126 U/L (ref 39–117)
ALT SERPL W P-5'-P-CCNC: 9 U/L (ref 1–33)
ANION GAP SERPL CALCULATED.3IONS-SCNC: 11.6 MMOL/L (ref 5–15)
AST SERPL-CCNC: 13 U/L (ref 1–32)
BACTERIA UR QL AUTO: ABNORMAL /HPF
BASOPHILS # BLD AUTO: 0.02 10*3/MM3 (ref 0–0.2)
BASOPHILS NFR BLD AUTO: 0.3 % (ref 0–1.5)
BILIRUB SERPL-MCNC: 0.2 MG/DL (ref 0–1.2)
BILIRUB UR QL STRIP: NEGATIVE
BUN SERPL-MCNC: 32 MG/DL (ref 8–23)
BUN/CREAT SERPL: 14.8 (ref 7–25)
CALCIUM SPEC-SCNC: 9.9 MG/DL (ref 8.6–10.5)
CHLORIDE SERPL-SCNC: 109 MMOL/L (ref 98–107)
CHOLEST SERPL-MCNC: 207 MG/DL (ref 0–200)
CLARITY UR: CLEAR
CO2 SERPL-SCNC: 19.4 MMOL/L (ref 22–29)
COLOR UR: YELLOW
CREAT SERPL-MCNC: 2.16 MG/DL (ref 0.57–1)
CREAT UR-MCNC: 94.3 MG/DL
DEPRECATED RDW RBC AUTO: 45.4 FL (ref 37–54)
EGFRCR SERPLBLD CKD-EPI 2021: 22.1 ML/MIN/1.73
EOSINOPHIL # BLD AUTO: 0.21 10*3/MM3 (ref 0–0.4)
EOSINOPHIL NFR BLD AUTO: 3.1 % (ref 0.3–6.2)
ERYTHROCYTE [DISTWIDTH] IN BLOOD BY AUTOMATED COUNT: 14.4 % (ref 12.3–15.4)
FERRITIN SERPL-MCNC: 155 NG/ML (ref 13–150)
GLOBULIN UR ELPH-MCNC: 2.8 GM/DL
GLUCOSE SERPL-MCNC: 159 MG/DL (ref 65–99)
GLUCOSE UR STRIP-MCNC: NEGATIVE MG/DL
HBA1C MFR BLD: 6.5 % (ref 4.8–5.6)
HCT VFR BLD AUTO: 30.3 % (ref 34–46.6)
HDLC SERPL-MCNC: 42 MG/DL (ref 40–60)
HGB BLD-MCNC: 9.9 G/DL (ref 12–15.9)
HGB UR QL STRIP.AUTO: NEGATIVE
HYALINE CASTS UR QL AUTO: ABNORMAL /LPF
IMM GRANULOCYTES # BLD AUTO: 0.04 10*3/MM3 (ref 0–0.05)
IMM GRANULOCYTES NFR BLD AUTO: 0.6 % (ref 0–0.5)
IRON 24H UR-MRATE: 56 MCG/DL (ref 37–145)
IRON SATN MFR SERPL: 17 % (ref 20–50)
KETONES UR QL STRIP: NEGATIVE
LDLC SERPL CALC-MCNC: 116 MG/DL (ref 0–100)
LDLC/HDLC SERPL: 2.58 {RATIO}
LEUKOCYTE ESTERASE UR QL STRIP.AUTO: NEGATIVE
LYMPHOCYTES # BLD AUTO: 2.29 10*3/MM3 (ref 0.7–3.1)
LYMPHOCYTES NFR BLD AUTO: 33.7 % (ref 19.6–45.3)
MCH RBC QN AUTO: 28.8 PG (ref 26.6–33)
MCHC RBC AUTO-ENTMCNC: 32.7 G/DL (ref 31.5–35.7)
MCV RBC AUTO: 88.1 FL (ref 79–97)
MONOCYTES # BLD AUTO: 0.42 10*3/MM3 (ref 0.1–0.9)
MONOCYTES NFR BLD AUTO: 6.2 % (ref 5–12)
NEUTROPHILS NFR BLD AUTO: 3.82 10*3/MM3 (ref 1.7–7)
NEUTROPHILS NFR BLD AUTO: 56.1 % (ref 42.7–76)
NITRITE UR QL STRIP: NEGATIVE
NRBC BLD AUTO-RTO: 0 /100 WBC (ref 0–0.2)
PH UR STRIP.AUTO: 6 [PH] (ref 5–8)
PHOSPHATE SERPL-MCNC: 3.3 MG/DL (ref 2.5–4.5)
PLATELET # BLD AUTO: 191 10*3/MM3 (ref 140–450)
PMV BLD AUTO: 9.4 FL (ref 6–12)
POTASSIUM SERPL-SCNC: 4.5 MMOL/L (ref 3.5–5.2)
PROT ?TM UR-MCNC: 13.2 MG/DL
PROT SERPL-MCNC: 6.7 G/DL (ref 6–8.5)
PROT UR QL STRIP: ABNORMAL
PROT/CREAT UR: 0.14 MG/G{CREAT}
PTH-INTACT SERPL-MCNC: 52.9 PG/ML (ref 15–65)
RBC # BLD AUTO: 3.44 10*6/MM3 (ref 3.77–5.28)
RBC # UR STRIP: ABNORMAL /HPF
REF LAB TEST METHOD: ABNORMAL
SODIUM SERPL-SCNC: 140 MMOL/L (ref 136–145)
SP GR UR STRIP: 1.02 (ref 1–1.03)
SQUAMOUS #/AREA URNS HPF: ABNORMAL /HPF
TIBC SERPL-MCNC: 320 MCG/DL (ref 298–536)
TRANSFERRIN SERPL-MCNC: 215 MG/DL (ref 200–360)
TRIGL SERPL-MCNC: 283 MG/DL (ref 0–150)
URATE SERPL-MCNC: 5.5 MG/DL (ref 2.4–5.7)
UROBILINOGEN UR QL STRIP: ABNORMAL
VLDLC SERPL-MCNC: 49 MG/DL (ref 5–40)
WBC # UR STRIP: ABNORMAL /HPF
WBC NRBC COR # BLD AUTO: 6.8 10*3/MM3 (ref 3.4–10.8)

## 2024-05-30 PROCEDURE — 80061 LIPID PANEL: CPT

## 2024-05-30 PROCEDURE — 82306 VITAMIN D 25 HYDROXY: CPT

## 2024-05-30 PROCEDURE — 80053 COMPREHEN METABOLIC PANEL: CPT

## 2024-05-30 PROCEDURE — 82728 ASSAY OF FERRITIN: CPT

## 2024-05-30 PROCEDURE — 84466 ASSAY OF TRANSFERRIN: CPT

## 2024-05-30 PROCEDURE — 36415 COLL VENOUS BLD VENIPUNCTURE: CPT

## 2024-05-30 PROCEDURE — 82570 ASSAY OF URINE CREATININE: CPT

## 2024-05-30 PROCEDURE — 81001 URINALYSIS AUTO W/SCOPE: CPT

## 2024-05-30 PROCEDURE — 84100 ASSAY OF PHOSPHORUS: CPT

## 2024-05-30 PROCEDURE — 85025 COMPLETE CBC W/AUTO DIFF WBC: CPT

## 2024-05-30 PROCEDURE — 83036 HEMOGLOBIN GLYCOSYLATED A1C: CPT

## 2024-05-30 PROCEDURE — 83970 ASSAY OF PARATHORMONE: CPT

## 2024-05-30 PROCEDURE — 84156 ASSAY OF PROTEIN URINE: CPT

## 2024-05-30 PROCEDURE — 83540 ASSAY OF IRON: CPT

## 2024-05-30 PROCEDURE — 84550 ASSAY OF BLOOD/URIC ACID: CPT

## 2024-06-03 ENCOUNTER — OFFICE VISIT (OUTPATIENT)
Dept: INTERNAL MEDICINE | Age: 84
End: 2024-06-03
Payer: MEDICARE

## 2024-06-03 VITALS
BODY MASS INDEX: 35.84 KG/M2 | WEIGHT: 223 LBS | HEART RATE: 92 BPM | HEIGHT: 66 IN | OXYGEN SATURATION: 94 % | TEMPERATURE: 98 F

## 2024-06-03 DIAGNOSIS — E06.3 HYPOTHYROIDISM DUE TO HASHIMOTO'S THYROIDITIS: ICD-10-CM

## 2024-06-03 DIAGNOSIS — I48.11 LONGSTANDING PERSISTENT ATRIAL FIBRILLATION: ICD-10-CM

## 2024-06-03 DIAGNOSIS — D50.9 IRON DEFICIENCY ANEMIA, UNSPECIFIED IRON DEFICIENCY ANEMIA TYPE: ICD-10-CM

## 2024-06-03 DIAGNOSIS — R74.8 ELEVATED LIVER ENZYMES: ICD-10-CM

## 2024-06-03 DIAGNOSIS — E11.9 TYPE 2 DIABETES MELLITUS WITHOUT COMPLICATION, WITHOUT LONG-TERM CURRENT USE OF INSULIN: ICD-10-CM

## 2024-06-03 DIAGNOSIS — N18.32 STAGE 3B CHRONIC KIDNEY DISEASE: ICD-10-CM

## 2024-06-03 DIAGNOSIS — I10 HYPERTENSION, ESSENTIAL: ICD-10-CM

## 2024-06-03 DIAGNOSIS — N18.4 ANEMIA OF CHRONIC RENAL FAILURE, STAGE 4 (SEVERE): ICD-10-CM

## 2024-06-03 DIAGNOSIS — E55.9 VITAMIN D DEFICIENCY: ICD-10-CM

## 2024-06-03 DIAGNOSIS — Z00.00 MEDICARE ANNUAL WELLNESS VISIT, SUBSEQUENT: Primary | ICD-10-CM

## 2024-06-03 DIAGNOSIS — E66.01 MORBID (SEVERE) OBESITY DUE TO EXCESS CALORIES: ICD-10-CM

## 2024-06-03 DIAGNOSIS — F41.9 ANXIETY: ICD-10-CM

## 2024-06-03 DIAGNOSIS — E03.8 HYPOTHYROIDISM DUE TO HASHIMOTO'S THYROIDITIS: ICD-10-CM

## 2024-06-03 DIAGNOSIS — D63.1 ANEMIA OF CHRONIC RENAL FAILURE, STAGE 4 (SEVERE): ICD-10-CM

## 2024-06-03 DIAGNOSIS — C50.919 MALIGNANT NEOPLASM OF FEMALE BREAST, UNSPECIFIED ESTROGEN RECEPTOR STATUS, UNSPECIFIED LATERALITY, UNSPECIFIED SITE OF BREAST: ICD-10-CM

## 2024-06-03 DIAGNOSIS — F51.01 PRIMARY INSOMNIA: ICD-10-CM

## 2024-06-03 PROCEDURE — 1170F FXNL STATUS ASSESSED: CPT | Performed by: INTERNAL MEDICINE

## 2024-06-03 PROCEDURE — 1126F AMNT PAIN NOTED NONE PRSNT: CPT | Performed by: INTERNAL MEDICINE

## 2024-06-03 PROCEDURE — 99214 OFFICE O/P EST MOD 30 MIN: CPT | Performed by: INTERNAL MEDICINE

## 2024-06-03 PROCEDURE — G0439 PPPS, SUBSEQ VISIT: HCPCS | Performed by: INTERNAL MEDICINE

## 2024-06-03 RX ORDER — DULAGLUTIDE 1.5 MG/.5ML
1.5 INJECTION, SOLUTION SUBCUTANEOUS WEEKLY
Qty: 2 ML | Refills: 5 | Status: SHIPPED | OUTPATIENT
Start: 2024-06-03

## 2024-06-03 NOTE — PROGRESS NOTES
The ABCs of the Annual Wellness Visit  Subsequent Medicare Wellness Visit    Subjective      Luzma Dick is a 84 y.o. female who presents for a Subsequent Medicare Wellness Visit.    The following portions of the patient's history were reviewed and   updated as appropriate: allergies, current medications, past family history, past medical history, past social history, past surgical history, and problem list.    Compared to one year ago, the patient feels her physical   health is the same.    Compared to one year ago, the patient feels her mental   health is the same.    Recent Hospitalizations:  She was not admitted to the hospital during the last year.       Current Medical Providers:  Patient Care Team:  Shay Ly MD as PCP - General (Internal Medicine)    Outpatient Medications Prior to Visit   Medication Sig Dispense Refill    albuterol sulfate HFA (Ventolin HFA) 108 (90 Base) MCG/ACT inhaler Inhale 2 puffs Every 4 (Four) Hours As Needed for Wheezing or Shortness of Air. 8.5 g 5    allopurinol (ZYLOPRIM) 300 MG tablet Take 1 tablet by mouth Daily. 90 tablet 3    apixaban (ELIQUIS) 5 MG tablet tablet Take 1 tablet by mouth 2 (Two) Times a Day. 180 tablet 3    carvedilol (COREG) 12.5 MG tablet Take 1 tablet by mouth 2 (Two) Times a Day With Meals. 180 tablet 3    Cholecalciferol (Vitamin D) 50 MCG (2000 UT) capsule Take 1 capsule by mouth Daily. 90 capsule 3    colestipol (COLESTID) 1 g tablet Take 1 tablet by mouth 2 (Two) Times a Day. 180 tablet 3    dilTIAZem (TIAZAC) 180 MG 24 hr capsule Take 1 capsule by mouth Daily. 90 capsule 3    doxazosin (CARDURA) 8 MG tablet Take 1 tablet by mouth Every Night. 90 tablet 3    Dulaglutide (Trulicity) 0.75 MG/0.5ML solution pen-injector Inject 0.75 mg under the skin into the appropriate area as directed 1 (One) Time Per Week. 2 mL 5    Fluticasone-Umeclidin-Vilant (TRELEGY) 100-62.5-25 MCG/ACT inhaler Inhale 1 puff Daily. 3 each 3    glucose blood test  strip 1 each by Other route As Needed (as needed). Use as instructed 100 each 3    glyBURIDE-metFORMIN (GLUCOVANCE) 2.5-500 MG per tablet Take 1 tablet by mouth 2 (Two) Times a Day With Meals. 180 tablet 3    guaifenesin-dextromethorphan (MUCINEX DM)  MG tablet sustained-release 12 hour tablet Take 1 tablet by mouth 2 (Two) Times a Day As Needed (Cough). 20 each 0    hydrALAZINE (APRESOLINE) 25 MG tablet Take 1 tablet by mouth 3 (Three) Times a Day. 270 tablet 3    Januvia 50 MG tablet Take 1 tablet by mouth Daily. 90 tablet 3    levothyroxine (SYNTHROID, LEVOTHROID) 50 MCG tablet Take 1 tablet by mouth Every Morning. 90 tablet 3    losartan-hydrochlorothiazide (Hyzaar) 100-25 MG per tablet Take 1 tablet by mouth Daily. 90 tablet 3    zolpidem (AMBIEN) 5 MG tablet Take 1 tablet by mouth At Night As Needed for Sleep. for sleep 90 tablet 1    doxycycline (DORYX) 100 MG enteric coated tablet Take 1 tablet by mouth 2 (Two) Times a Day. (Patient not taking: Reported on 6/3/2024) 20 tablet 0    escitalopram (Lexapro) 5 MG tablet Take 1 tablet by mouth Daily. (Patient not taking: Reported on 6/3/2024) 90 tablet 3     No facility-administered medications prior to visit.       No opioid medication identified on active medication list. I have reviewed chart for other potential  high risk medication/s and harmful drug interactions in the elderly.        Aspirin is not on active medication list.  Aspirin use is not indicated based on review of current medical condition/s. Risk of harm outweighs potential benefits.  .    Patient Active Problem List   Diagnosis    Malignant neoplasm of upper-outer quadrant of right female breast    FH: breast cancer    Hypertension, essential    Morbid (severe) obesity due to excess calories    Iron deficiency anemia    Diabetes 1.5, managed as type 2    Stage 3a chronic kidney disease    Acute left-sided low back pain without sciatica    Elevated liver enzymes    Vitamin D deficiency     "Longstanding persistent atrial fibrillation    Hypothyroidism due to Hashimoto's thyroiditis    Primary insomnia    Medicare annual wellness visit, subsequent    Type 2 diabetes mellitus without complication, with long-term current use of insulin    Breast cancer    Arthritis    Anxiety    Pruritus    Dermatitis    Perforation of left tympanic membrane    Persistent dry cough    Pneumonia of left lower lobe due to infectious organism    Arthralgia    Anemia of chronic renal failure, stage 4 (severe)    Chronic kidney disease     Advance Care Planning   Advance Care Planning     Advance Directive is on file.  ACP discussion was held with the patient during this visit. Patient has an advance directive in EMR which is still valid.      Objective    Vitals:    06/03/24 1348   Pulse: 92   Temp: 98 °F (36.7 °C)   SpO2: 94%   Weight: 101 kg (223 lb)   Height: 166.4 cm (65.51\")     Estimated body mass index is 36.53 kg/m² as calculated from the following:    Height as of this encounter: 166.4 cm (65.51\").    Weight as of this encounter: 101 kg (223 lb).    Class 2 Severe Obesity (BMI >=35 and <=39.9). Obesity-related health conditions include the following: hypertension and diabetes mellitus. Obesity is unchanged. BMI is is above average; BMI management plan is completed. We discussed low calorie, low carb based diet program, portion control, and increasing exercise.      Does the patient have evidence of cognitive impairment?   No    Lab Results   Component Value Date    TRIG 283 (H) 05/30/2024    HDL 42 05/30/2024     (H) 05/30/2024    VLDL 49 (H) 05/30/2024    HGBA1C 6.50 (H) 05/30/2024          HEALTH RISK ASSESSMENT    Smoking Status:  Social History     Tobacco Use   Smoking Status Never   Smokeless Tobacco Never     Alcohol Consumption:  Social History     Substance and Sexual Activity   Alcohol Use No     Fall Risk Screen:    STEADI Fall Risk Assessment was completed, and patient is at LOW risk for " falls.Assessment completed on:2024    Depression Screenin/1/2024     1:49 PM   PHQ-2/PHQ-9 Depression Screening   Little Interest or Pleasure in Doing Things 0-->not at all   Feeling Down, Depressed or Hopeless 0-->not at all   PHQ-9: Brief Depression Severity Measure Score 0       Health Habits and Functional and Cognitive Screenin/3/2024     1:00 PM   Functional & Cognitive Status   Do you have difficulty preparing food and eating? No   Do you have difficulty bathing yourself, getting dressed or grooming yourself? No   Do you have difficulty using the toilet? No   Do you have difficulty moving around from place to place? No   Do you have trouble with steps or getting out of a bed or a chair? No   Current Diet Well Balanced Diet   Dental Exam Up to date   Eye Exam Up to date   Exercise (times per week) 6 times per week   Current Exercises Include Walking   Do you need help using the phone?  No   Are you deaf or do you have serious difficulty hearing?  No   Do you need help to go to places out of walking distance? No   Do you need help shopping? No   Do you need help preparing meals?  No   Do you need help with housework?  No   Do you need help with laundry? No   Do you need help taking your medications? No   Do you need help managing money? No   Do you ever drive or ride in a car without wearing a seat belt? No   Have you felt unusual stress, anger or loneliness in the last month? No   Who do you live with? Child   If you need help, do you have trouble finding someone available to you? No   Have you been bothered in the last four weeks by sexual problems? No   Do you have difficulty concentrating, remembering or making decisions? No       Age-appropriate Screening Schedule:  Refer to the list below for future screening recommendations based on patient's age, sex and/or medical conditions. Orders for these recommended tests are listed in the plan section. The patient has been provided with a  written plan.    Health Maintenance   Topic Date Due    TDAP/TD VACCINES (1 - Tdap) Never done    RSV Vaccine - Adults (1 - 1-dose 60+ series) Never done    ZOSTER VACCINE (2 of 3) 07/10/2014    DIABETIC EYE EXAM  03/03/2021    DXA SCAN  03/09/2022    COVID-19 Vaccine (4 - 2023-24 season) 09/01/2023    BMI FOLLOWUP  04/04/2024    INFLUENZA VACCINE  08/01/2024    MAMMOGRAM  11/21/2024    HEMOGLOBIN A1C  11/30/2024    LIPID PANEL  05/30/2025    URINE MICROALBUMIN  05/30/2025    ANNUAL WELLNESS VISIT  06/03/2025    Pneumococcal Vaccine 65+  Completed                  CMS Preventative Services Quick Reference  Risk Factors Identified During Encounter:    None Identified    The above risks/problems have been discussed with the patient.  Pertinent information has been shared with the patient in the After Visit Summary.    Diagnoses and all orders for this visit:    1. Medicare annual wellness visit, subsequent (Primary)    2. Primary insomnia    3. Malignant neoplasm of female breast, unspecified estrogen receptor status, unspecified laterality, unspecified site of breast    4. Anxiety    5. Iron deficiency anemia, unspecified iron deficiency anemia type    6. Stage 3b chronic kidney disease    7. Vitamin D deficiency    8. Longstanding persistent atrial fibrillation    9. Type 2 diabetes mellitus without complication, without long-term current use of insulin    10. Hypothyroidism due to Hashimoto's thyroiditis    11. Morbid (severe) obesity due to excess calories    12. Hypertension, essential    13. Anemia of chronic renal failure, stage 4 (severe)    14. Elevated liver enzymes        Follow Up:   Next Medicare Wellness visit to be scheduled in 1 year.      An After Visit Summary and PPPS were made available to the patient.

## 2024-06-03 NOTE — PROGRESS NOTES
"CHIEF COMPLAINT/ HPI:  Anxiety (Routine follow up, Lab follow up. Pt states no concerns today. )    Still has a lot of loose bowels diarrhea with the metformin, she saw nephrology today and was switched to Jardiance which is okay, we will have to watch kidney function closely          Objective   Vital Signs  Vitals:    06/03/24 1348   Pulse: 92   Temp: 98 °F (36.7 °C)   SpO2: 94%   Weight: 101 kg (223 lb)   Height: 166.4 cm (65.51\")      Body mass index is 36.53 kg/m².  Review of Systems   Constitutional: Negative.    HENT: Negative.     Eyes: Negative.    Respiratory: Negative.     Cardiovascular: Negative.    Gastrointestinal:  Positive for diarrhea.   Endocrine: Negative.    Genitourinary: Negative.    Musculoskeletal: Negative.    Allergic/Immunologic: Negative.    Neurological: Negative.    Hematological: Negative.    Psychiatric/Behavioral: Negative.        Physical Exam  Constitutional:       General: She is not in acute distress.     Appearance: Normal appearance. She is obese.   HENT:      Head: Normocephalic.      Mouth/Throat:      Mouth: Mucous membranes are moist.   Eyes:      Conjunctiva/sclera: Conjunctivae normal.      Pupils: Pupils are equal, round, and reactive to light.   Cardiovascular:      Rate and Rhythm: Normal rate and regular rhythm.      Pulses: Normal pulses.      Heart sounds: Normal heart sounds.   Pulmonary:      Effort: Pulmonary effort is normal.      Breath sounds: Normal breath sounds.   Abdominal:      General: Bowel sounds are normal.      Palpations: Abdomen is soft.   Musculoskeletal:         General: No swelling. Normal range of motion.      Cervical back: Neck supple.   Skin:     General: Skin is warm and dry.      Coloration: Skin is not jaundiced.   Neurological:      General: No focal deficit present.      Mental Status: She is alert and oriented to person, place, and time. Mental status is at baseline.   Psychiatric:         Mood and Affect: Mood normal.         " Behavior: Behavior normal.         Thought Content: Thought content normal.         Judgment: Judgment normal.        Result Review :   Lab Results   Component Value Date    PROBNP 823.1 09/13/2021     01/09/2020     CMP          2/29/2024    11:43 4/4/2024    09:02 5/30/2024    10:30   CMP   Glucose 206  347  159    BUN 29  35  32    Creatinine 2.00  2.27  2.16    EGFR 24.4  20.9  22.1    Sodium 141  138  140    Potassium 4.3  4.5  4.5    Chloride 109  106  109    Calcium 9.3  9.5  9.9    Total Protein  6.4  6.7    Albumin 3.7  3.4  3.9    Globulin  3.0  2.8    Total Bilirubin  0.3  0.2    Alkaline Phosphatase  151  126    AST (SGOT)  12  13    ALT (SGPT)  10  9    Albumin/Globulin Ratio  1.1  1.4    BUN/Creatinine Ratio 14.5  15.4  14.8    Anion Gap 12.0  12.6  11.6      CBC w/diff          2/29/2024    11:43 4/4/2024    09:02 5/30/2024    10:30   CBC w/Diff   WBC 6.44  9.06  6.80    RBC 3.73  3.44  3.44    Hemoglobin 10.0  9.8  9.9    Hematocrit 31.8  29.8  30.3    MCV 85.3  86.6  88.1    MCH 26.8  28.5  28.8    MCHC 31.4  32.9  32.7    RDW 21.6  18.7  14.4    Platelets 205  178  191    Neutrophil Rel % 56.1  72.5  56.1    Immature Granulocyte Rel % 0.5  0.6  0.6    Lymphocyte Rel % 33.5  18.8  33.7    Monocyte Rel % 6.5  6.1  6.2    Eosinophil Rel % 3.1  1.9  3.1    Basophil Rel % 0.3  0.1  0.3       Lipid Panel          8/1/2023    10:35 1/29/2024    10:34 5/30/2024    10:30   Lipid Panel   Total Cholesterol 203  165  207    Triglycerides 310  209  283    HDL Cholesterol 43  40  42    VLDL Cholesterol 53  35  49    LDL Cholesterol  107  90  116    LDL/HDL Ratio 2.28  2.08  2.58       Lab Results   Component Value Date    TSH 2.550 01/29/2024    TSH 3.120 08/01/2023    TSH 3.590 11/17/2022      Lab Results   Component Value Date    FREET4 1.12 01/29/2024    FREET4 1.07 08/01/2023    FREET4 1.36 11/17/2022      A1C Last 3 Results          8/1/2023    10:35 1/29/2024    10:34 5/30/2024    10:30   HGBA1C  Last 3 Results   Hemoglobin A1C 6.80  7.30  6.50                        Visit Diagnoses:    ICD-10-CM ICD-9-CM   1. Medicare annual wellness visit, subsequent  Z00.00 V70.0   2. Primary insomnia  F51.01 307.42   3. Malignant neoplasm of female breast, unspecified estrogen receptor status, unspecified laterality, unspecified site of breast  C50.919 174.9   4. Anxiety  F41.9 300.00   5. Iron deficiency anemia, unspecified iron deficiency anemia type  D50.9 280.9   6. Stage 3b chronic kidney disease  N18.32 585.3   7. Vitamin D deficiency  E55.9 268.9   8. Longstanding persistent atrial fibrillation  I48.11 427.31   9. Type 2 diabetes mellitus without complication, without long-term current use of insulin  E11.9 250.00   10. Hypothyroidism due to Hashimoto's thyroiditis  E03.8 244.8    E06.3 245.2   11. Morbid (severe) obesity due to excess calories  E66.01 278.01   12. Hypertension, essential  I10 401.9   13. Anemia of chronic renal failure, stage 4 (severe)  N18.4 285.21    D63.1 585.4   14. Elevated liver enzymes  R74.8 790.5       Assessment and Plan   Diagnoses and all orders for this visit:    1. Medicare annual wellness visit, subsequent (Primary)  -     Basic Metabolic Panel; Future  -     CBC & Differential; Future  -     Comprehensive Metabolic Panel; Future  -     Hemoglobin A1c; Future  -     Lipid Panel; Future  -     TSH+Free T4; Future  -     Uric Acid; Future    2. Primary insomnia  -     Basic Metabolic Panel; Future  -     CBC & Differential; Future  -     Comprehensive Metabolic Panel; Future  -     Hemoglobin A1c; Future  -     Lipid Panel; Future  -     TSH+Free T4; Future  -     Uric Acid; Future    3. Malignant neoplasm of female breast, unspecified estrogen receptor status, unspecified laterality, unspecified site of breast  -     Basic Metabolic Panel; Future  -     CBC & Differential; Future  -     Comprehensive Metabolic Panel; Future  -     Hemoglobin A1c; Future  -     Lipid Panel;  Future  -     TSH+Free T4; Future  -     Uric Acid; Future    4. Anxiety  -     Basic Metabolic Panel; Future  -     CBC & Differential; Future  -     Comprehensive Metabolic Panel; Future  -     Hemoglobin A1c; Future  -     Lipid Panel; Future  -     TSH+Free T4; Future  -     Uric Acid; Future    5. Iron deficiency anemia, unspecified iron deficiency anemia type  -     Basic Metabolic Panel; Future  -     CBC & Differential; Future  -     Comprehensive Metabolic Panel; Future  -     Hemoglobin A1c; Future  -     Lipid Panel; Future  -     TSH+Free T4; Future  -     Uric Acid; Future    6. Stage 3b chronic kidney disease  -     Basic Metabolic Panel; Future  -     CBC & Differential; Future  -     Comprehensive Metabolic Panel; Future  -     Hemoglobin A1c; Future  -     Lipid Panel; Future  -     TSH+Free T4; Future  -     Uric Acid; Future    7. Vitamin D deficiency  -     Basic Metabolic Panel; Future  -     CBC & Differential; Future  -     Comprehensive Metabolic Panel; Future  -     Hemoglobin A1c; Future  -     Lipid Panel; Future  -     TSH+Free T4; Future  -     Uric Acid; Future    8. Longstanding persistent atrial fibrillation  -     Basic Metabolic Panel; Future  -     CBC & Differential; Future  -     Comprehensive Metabolic Panel; Future  -     Hemoglobin A1c; Future  -     Lipid Panel; Future  -     TSH+Free T4; Future  -     Uric Acid; Future    9. Type 2 diabetes mellitus without complication, without long-term current use of insulin  -     Basic Metabolic Panel; Future  -     CBC & Differential; Future  -     Comprehensive Metabolic Panel; Future  -     Hemoglobin A1c; Future  -     Lipid Panel; Future  -     TSH+Free T4; Future  -     Uric Acid; Future    10. Hypothyroidism due to Hashimoto's thyroiditis  -     Basic Metabolic Panel; Future  -     CBC & Differential; Future  -     Comprehensive Metabolic Panel; Future  -     Hemoglobin A1c; Future  -     Lipid Panel; Future  -     TSH+Free T4;  Future  -     Uric Acid; Future    11. Morbid (severe) obesity due to excess calories  -     Basic Metabolic Panel; Future  -     CBC & Differential; Future  -     Comprehensive Metabolic Panel; Future  -     Hemoglobin A1c; Future  -     Lipid Panel; Future  -     TSH+Free T4; Future  -     Uric Acid; Future    12. Hypertension, essential  -     Basic Metabolic Panel; Future  -     CBC & Differential; Future  -     Comprehensive Metabolic Panel; Future  -     Hemoglobin A1c; Future  -     Lipid Panel; Future  -     TSH+Free T4; Future  -     Uric Acid; Future    13. Anemia of chronic renal failure, stage 4 (severe)  -     Basic Metabolic Panel; Future  -     CBC & Differential; Future  -     Comprehensive Metabolic Panel; Future  -     Hemoglobin A1c; Future  -     Lipid Panel; Future  -     TSH+Free T4; Future  -     Uric Acid; Future    14. Elevated liver enzymes  -     Basic Metabolic Panel; Future  -     CBC & Differential; Future  -     Comprehensive Metabolic Panel; Future  -     Hemoglobin A1c; Future  -     Lipid Panel; Future  -     TSH+Free T4; Future  -     Uric Acid; Future    Other orders  -     Dulaglutide (Trulicity) 1.5 MG/0.5ML solution pen-injector; Inject 1.5 mg under the skin into the appropriate area as directed 1 (One) Time Per Week.  Dispense: 2 mL; Refill: 5    Annual wellness visit performed Agnes 3, 2024    Iron deficiency anemia,, improved stable at 9.9 hematocrit 30.3 May 30, 2024=== got 2 iron infusions by nephrology end of January 2024------------- colonoscopy Dr. Lopez 2019 multiple polyps, patient received iron infusions July 2022 -----previously had iron infusions in 2019,     Heart murmur,----- echocardiogram shows only trace mitral regurgitation and normal ejection fraction October 2020,, repeat echo shows low normal ejection fraction 50% to 55% no regional wall motion abnormalities there was some diastolic dysfunction borderline left atrial enlargement mild to moderate mitral  regurgitation but no significant valvular pathology, August 1, 2022    Subclinical hypothyroid---cont  Synthroid 0.05 mg daily     Diarrhea intermittent, uses colestipol as needed, still has her gallbladder, improved off metformin June 2024    s/p L TKA, 4/16,     chronic A. 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 guided removal December 3, 2014 with 4 sentinel lymph nodes removed all negative,------ off anastrozole per oncology/ [facility] and stable since  Dec 2020    HTN-, continues HYDRALAZINE 50 TID AND LOSARTAN 100/25 QD , DOXAZOSIN 4 MG QHS, COREG 12.5 BID, Cardizem  mg daily    B/L SHOULDER AND L KNEE OA--     GOUT- BETTER --continue ALLOPURINOL 300 mg daily    OBESE-MORBID    DM 2, HGA1C-down to 6.5 Agnes 3, 2024 ----  cont Januvia 50 mg daily,, Jardiance 10 mg daily, Trulicity 1.5 mg subcu weekly starting June 2024     EYES CHECK BLOOM / [facility] --MARCH 2021    ELEVATED LFTS, currently normal     VIT D DEF continues replacement     ELEVATED CHOL--patient intolerant of statins discussed,    INSOMNIA, continues Ambien nightly      CKD 3 b - CREAT up to 2.6 April 3, 2023, down to 2.2 January 29, 2024,, 2.1 May 30, 2024 started Jardiance Agnes 3, 2024 will follow-up with chemistry panel in 1 month as precaution due to mild acidosis        Follow Up   Return in about 4 months (around 10/3/2024).  Patient was given instructions and counseling regarding her condition or for health maintenance advice. Please see specific information pulled into the AVS if appropriate.

## 2024-07-01 ENCOUNTER — LAB (OUTPATIENT)
Dept: INTERNAL MEDICINE | Age: 84
End: 2024-07-01
Payer: MEDICARE

## 2024-07-01 DIAGNOSIS — E55.9 VITAMIN D DEFICIENCY: ICD-10-CM

## 2024-07-01 DIAGNOSIS — R74.8 ELEVATED LIVER ENZYMES: ICD-10-CM

## 2024-07-01 DIAGNOSIS — N18.4 ANEMIA OF CHRONIC RENAL FAILURE, STAGE 4 (SEVERE): ICD-10-CM

## 2024-07-01 DIAGNOSIS — D63.1 ANEMIA OF CHRONIC RENAL FAILURE, STAGE 4 (SEVERE): ICD-10-CM

## 2024-07-01 DIAGNOSIS — I48.11 LONGSTANDING PERSISTENT ATRIAL FIBRILLATION: ICD-10-CM

## 2024-07-01 DIAGNOSIS — D50.9 IRON DEFICIENCY ANEMIA, UNSPECIFIED IRON DEFICIENCY ANEMIA TYPE: ICD-10-CM

## 2024-07-01 DIAGNOSIS — E06.3 HYPOTHYROIDISM DUE TO HASHIMOTO'S THYROIDITIS: ICD-10-CM

## 2024-07-01 DIAGNOSIS — E66.01 MORBID (SEVERE) OBESITY DUE TO EXCESS CALORIES: ICD-10-CM

## 2024-07-01 DIAGNOSIS — Z00.00 MEDICARE ANNUAL WELLNESS VISIT, SUBSEQUENT: ICD-10-CM

## 2024-07-01 DIAGNOSIS — F51.01 PRIMARY INSOMNIA: ICD-10-CM

## 2024-07-01 DIAGNOSIS — N18.32 STAGE 3B CHRONIC KIDNEY DISEASE: ICD-10-CM

## 2024-07-01 DIAGNOSIS — C50.919 MALIGNANT NEOPLASM OF FEMALE BREAST, UNSPECIFIED ESTROGEN RECEPTOR STATUS, UNSPECIFIED LATERALITY, UNSPECIFIED SITE OF BREAST: ICD-10-CM

## 2024-07-01 DIAGNOSIS — F41.9 ANXIETY: ICD-10-CM

## 2024-07-01 DIAGNOSIS — E11.9 TYPE 2 DIABETES MELLITUS WITHOUT COMPLICATION, WITHOUT LONG-TERM CURRENT USE OF INSULIN: ICD-10-CM

## 2024-07-01 DIAGNOSIS — E03.8 HYPOTHYROIDISM DUE TO HASHIMOTO'S THYROIDITIS: ICD-10-CM

## 2024-07-01 DIAGNOSIS — I10 HYPERTENSION, ESSENTIAL: ICD-10-CM

## 2024-07-01 LAB
ANION GAP SERPL CALCULATED.3IONS-SCNC: 12 MMOL/L (ref 5–15)
BUN SERPL-MCNC: 55 MG/DL (ref 8–23)
BUN/CREAT SERPL: 15.8 (ref 7–25)
CALCIUM SPEC-SCNC: 10.1 MG/DL (ref 8.6–10.5)
CHLORIDE SERPL-SCNC: 107 MMOL/L (ref 98–107)
CO2 SERPL-SCNC: 18 MMOL/L (ref 22–29)
CREAT SERPL-MCNC: 3.49 MG/DL (ref 0.57–1)
EGFRCR SERPLBLD CKD-EPI 2021: 12.4 ML/MIN/1.73
GLUCOSE SERPL-MCNC: 296 MG/DL (ref 65–99)
POTASSIUM SERPL-SCNC: 4.3 MMOL/L (ref 3.5–5.2)
SODIUM SERPL-SCNC: 137 MMOL/L (ref 136–145)

## 2024-07-01 PROCEDURE — 80048 BASIC METABOLIC PNL TOTAL CA: CPT | Performed by: INTERNAL MEDICINE

## 2024-07-01 PROCEDURE — 36415 COLL VENOUS BLD VENIPUNCTURE: CPT | Performed by: INTERNAL MEDICINE

## 2024-07-03 ENCOUNTER — OFFICE VISIT (OUTPATIENT)
Dept: INTERNAL MEDICINE | Age: 84
End: 2024-07-03
Payer: MEDICARE

## 2024-07-03 VITALS
SYSTOLIC BLOOD PRESSURE: 120 MMHG | TEMPERATURE: 98.2 F | WEIGHT: 219 LBS | OXYGEN SATURATION: 93 % | HEIGHT: 66 IN | HEART RATE: 91 BPM | DIASTOLIC BLOOD PRESSURE: 63 MMHG | BODY MASS INDEX: 35.2 KG/M2

## 2024-07-03 DIAGNOSIS — N18.32 STAGE 3B CHRONIC KIDNEY DISEASE: ICD-10-CM

## 2024-07-03 DIAGNOSIS — E66.01 MORBID (SEVERE) OBESITY DUE TO EXCESS CALORIES: ICD-10-CM

## 2024-07-03 DIAGNOSIS — N17.9 AKI (ACUTE KIDNEY INJURY): Primary | ICD-10-CM

## 2024-07-03 DIAGNOSIS — I48.11 LONGSTANDING PERSISTENT ATRIAL FIBRILLATION: ICD-10-CM

## 2024-07-03 DIAGNOSIS — I10 HYPERTENSION, ESSENTIAL: ICD-10-CM

## 2024-07-03 PROCEDURE — 1126F AMNT PAIN NOTED NONE PRSNT: CPT | Performed by: INTERNAL MEDICINE

## 2024-07-03 PROCEDURE — 99214 OFFICE O/P EST MOD 30 MIN: CPT | Performed by: INTERNAL MEDICINE

## 2024-07-03 PROCEDURE — 3074F SYST BP LT 130 MM HG: CPT | Performed by: INTERNAL MEDICINE

## 2024-07-03 PROCEDURE — 3078F DIAST BP <80 MM HG: CPT | Performed by: INTERNAL MEDICINE

## 2024-07-03 NOTE — PROGRESS NOTES
"CHIEF COMPLAINT/ HPI:  Anxiety (Routine visit. Lab follow up. Pt states no concerns today. )              Objective   Vital Signs  Vitals:    07/03/24 1227   BP: 120/63   Pulse: 91   Temp: 98.2 °F (36.8 °C)   SpO2: 93%   Weight: 99.3 kg (219 lb)   Height: 166.4 cm (65.51\")      Body mass index is 35.88 kg/m².  Review of Systems   Physical Exam   Result Review :   Lab Results   Component Value Date    PROBNP 823.1 09/13/2021     01/09/2020     CMP          4/4/2024    09:02 5/30/2024    10:30 7/1/2024    10:04   CMP   Glucose 347  159  296    BUN 35  32  55    Creatinine 2.27  2.16  3.49    EGFR 20.9  22.1  12.4    Sodium 138  140  137    Potassium 4.5  4.5  4.3    Chloride 106  109  107    Calcium 9.5  9.9  10.1    Total Protein 6.4  6.7     Albumin 3.4  3.9     Globulin 3.0  2.8     Total Bilirubin 0.3  0.2     Alkaline Phosphatase 151  126     AST (SGOT) 12  13     ALT (SGPT) 10  9     Albumin/Globulin Ratio 1.1  1.4     BUN/Creatinine Ratio 15.4  14.8  15.8    Anion Gap 12.6  11.6  12.0      CBC w/diff          2/29/2024    11:43 4/4/2024    09:02 5/30/2024    10:30   CBC w/Diff   WBC 6.44  9.06  6.80    RBC 3.73  3.44  3.44    Hemoglobin 10.0  9.8  9.9    Hematocrit 31.8  29.8  30.3    MCV 85.3  86.6  88.1    MCH 26.8  28.5  28.8    MCHC 31.4  32.9  32.7    RDW 21.6  18.7  14.4    Platelets 205  178  191    Neutrophil Rel % 56.1  72.5  56.1    Immature Granulocyte Rel % 0.5  0.6  0.6    Lymphocyte Rel % 33.5  18.8  33.7    Monocyte Rel % 6.5  6.1  6.2    Eosinophil Rel % 3.1  1.9  3.1    Basophil Rel % 0.3  0.1  0.3       Lipid Panel          8/1/2023    10:35 1/29/2024    10:34 5/30/2024    10:30   Lipid Panel   Total Cholesterol 203  165  207    Triglycerides 310  209  283    HDL Cholesterol 43  40  42    VLDL Cholesterol 53  35  49    LDL Cholesterol  107  90  116    LDL/HDL Ratio 2.28  2.08  2.58       Lab Results   Component Value Date    TSH 2.550 01/29/2024    TSH 3.120 08/01/2023    TSH 3.590 " 11/17/2022      Lab Results   Component Value Date    FREET4 1.12 01/29/2024    FREET4 1.07 08/01/2023    FREET4 1.36 11/17/2022      A1C Last 3 Results          8/1/2023    10:35 1/29/2024    10:34 5/30/2024    10:30   HGBA1C Last 3 Results   Hemoglobin A1C 6.80  7.30  6.50                        Visit Diagnoses:    ICD-10-CM ICD-9-CM   1. MARCO (acute kidney injury)  N17.9 584.9   2. Longstanding persistent atrial fibrillation  I48.11 427.31   3. Morbid (severe) obesity due to excess calories  E66.01 278.01   4. Hypertension, essential  I10 401.9   5. Stage 3b chronic kidney disease  N18.32 585.3       Assessment and Plan   Diagnoses and all orders for this visit:    1. MARCO (acute kidney injury) (Primary)  -     Basic Metabolic Panel; Future    2. Longstanding persistent atrial fibrillation  -     Basic Metabolic Panel; Future    3. Morbid (severe) obesity due to excess calories  -     Basic Metabolic Panel; Future    4. Hypertension, essential  -     Basic Metabolic Panel; Future    5. Stage 3b chronic kidney disease  -     Basic Metabolic Panel; Future        CKD 3 b - CREAT up to 2.6 April 3, 2023, down to 2.2 January 29, 2024,, 2.1 May 30, 2024 started Jardiance Agnes 3, 2024 creatinine up to 3.5 BUN up to 55, told patient to stop taking Jardiance push fluids, she might even hold her blood pressure medication losartan HCT for a day or 2, she can go back to metformin for diabetes management, July 3, 2024, will follow-up with a chemistry panel in 2 to 3 weeks,    Patient had a 2.1 cm fluid collection with subcutaneous soft tissues of the left medial gluteal cleft with some inflammation suggesting a small abscess that was I&D in the emergency room April 4, 2024,    Annual wellness visit performed Agnes 3, 2024    Iron deficiency anemia,, improved stable at 9.9 hematocrit 30.3 May 30, 2024=== got 2 iron infusions by nephrology end of January 2024------------- colonoscopy Dr. Lopez 2019 multiple polyps, patient  received iron infusions July 2022 -----previously had iron infusions in 2019,     Heart murmur,----- echocardiogram shows only trace mitral regurgitation and normal ejection fraction October 2020,, repeat echo shows low normal ejection fraction 50% to 55% no regional wall motion abnormalities there was some diastolic dysfunction borderline left atrial enlargement mild to moderate mitral regurgitation but no significant valvular pathology, August 1, 2022    Subclinical hypothyroid---cont  Synthroid 0.05 mg daily     Diarrhea intermittent, uses colestipol as needed, still has her gallbladder, improved off metformin June 2024    s/p L TKA, 4/16,     chronic A. 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 guided removal December 3, 2014 with 4 sentinel lymph nodes removed all negative,------ off anastrozole per oncology/ LYE and stable since  Dec 2020    HTN-, continues HYDRALAZINE 50 TID AND LOSARTAN 100/25 QD , DOXAZOSIN 4 MG QHS, COREG 12.5 BID, Cardizem  mg daily    B/L SHOULDER AND L KNEE OA--     GOUT- BETTER --continue ALLOPURINOL 300 mg daily    OBESE-MORBID    DM 2, HGA1C-down to 6.5 Agnes 3, 2024 ----  cont Januvia 50 mg daily,, Jardiance 10 mg daily, Trulicity 1.5 mg subcu weekly starting June 2024     EYES CHECK BLOOM / BENNET --MARCH 2021    ELEVATED LFTS, currently normal     VIT D DEF continues replacement     ELEVATED CHOL--patient intolerant of statins discussed,    INSOMNIA, continues Ambien nightly        Follow Up   Return for Next scheduled follow up.  Patient was given instructions and counseling regarding her condition or for health maintenance advice. Please see specific information pulled into the AVS if appropriate.

## 2024-07-16 ENCOUNTER — LAB (OUTPATIENT)
Dept: INTERNAL MEDICINE | Age: 84
End: 2024-07-16
Payer: MEDICARE

## 2024-07-16 DIAGNOSIS — E66.01 MORBID (SEVERE) OBESITY DUE TO EXCESS CALORIES: ICD-10-CM

## 2024-07-16 DIAGNOSIS — F51.01 PRIMARY INSOMNIA: ICD-10-CM

## 2024-07-16 DIAGNOSIS — N17.9 AKI (ACUTE KIDNEY INJURY): ICD-10-CM

## 2024-07-16 DIAGNOSIS — I48.11 LONGSTANDING PERSISTENT ATRIAL FIBRILLATION: ICD-10-CM

## 2024-07-16 DIAGNOSIS — I10 HYPERTENSION, ESSENTIAL: ICD-10-CM

## 2024-07-16 DIAGNOSIS — N18.32 STAGE 3B CHRONIC KIDNEY DISEASE: ICD-10-CM

## 2024-07-16 LAB
ANION GAP SERPL CALCULATED.3IONS-SCNC: 13 MMOL/L (ref 5–15)
BUN SERPL-MCNC: 46 MG/DL (ref 8–23)
BUN/CREAT SERPL: 18.3 (ref 7–25)
CALCIUM SPEC-SCNC: 10.1 MG/DL (ref 8.6–10.5)
CHLORIDE SERPL-SCNC: 107 MMOL/L (ref 98–107)
CO2 SERPL-SCNC: 19 MMOL/L (ref 22–29)
CREAT SERPL-MCNC: 2.51 MG/DL (ref 0.57–1)
EGFRCR SERPLBLD CKD-EPI 2021: 18.4 ML/MIN/1.73
GLUCOSE SERPL-MCNC: 120 MG/DL (ref 65–99)
POTASSIUM SERPL-SCNC: 4.3 MMOL/L (ref 3.5–5.2)
SODIUM SERPL-SCNC: 139 MMOL/L (ref 136–145)

## 2024-07-16 PROCEDURE — 80048 BASIC METABOLIC PNL TOTAL CA: CPT | Performed by: INTERNAL MEDICINE

## 2024-07-16 PROCEDURE — 36415 COLL VENOUS BLD VENIPUNCTURE: CPT | Performed by: INTERNAL MEDICINE

## 2024-07-16 RX ORDER — CARVEDILOL 12.5 MG/1
12.5 TABLET ORAL 2 TIMES DAILY WITH MEALS
Qty: 180 TABLET | Refills: 3 | Status: SHIPPED | OUTPATIENT
Start: 2024-07-16

## 2024-07-16 RX ORDER — SITAGLIPTIN 50 MG/1
50 TABLET, FILM COATED ORAL DAILY
Qty: 90 TABLET | Refills: 3 | Status: SHIPPED | OUTPATIENT
Start: 2024-07-16

## 2024-07-16 RX ORDER — ZOLPIDEM TARTRATE 5 MG/1
5 TABLET ORAL NIGHTLY PRN
Qty: 90 TABLET | Refills: 1 | Status: SHIPPED | OUTPATIENT
Start: 2024-07-16 | End: 2024-07-16 | Stop reason: SDUPTHER

## 2024-07-16 NOTE — TELEPHONE ENCOUNTER
Caller: Luzma Dick    Relationship: Self    Best call back number: [phone]     Which medication are you concerned about:     Dulaglutide (Trulicity) 1.5 MG/0.5ML solution pen-injector       Who prescribed you this medication: Shay Ly MD     What are your concerns: PATIENT STATED PHARMACY TOLD HER TODAY THAT MEDICATION TRULICITY WAS DISCONTINUED AND WOULD LIKE A CALL BACK TO SEE WHAT OTHER MEDICATION SHE COULD TRY.

## 2024-07-16 NOTE — TELEPHONE ENCOUNTER
Caller: MayelinLuzma perez    Relationship: Self    Best call back number: [phone]     Requested Prescriptions:   Requested Prescriptions     Pending Prescriptions Disp Refills    zolpidem (AMBIEN) 5 MG tablet 90 tablet 1     Sig: Take 1 tablet by mouth At Night As Needed for Sleep. for sleep    apixaban (ELIQUIS) 5 MG tablet tablet 180 tablet 3     Sig: Take 1 tablet by mouth 2 (Two) Times a Day.        Pharmacy where request should be sent: [id number] [place] [address] AT [facility] [address] - [phone] [facility] [phone] FX  [facility] - [address] - [phone] [facility] [phone] FX     Last office visit with prescribing clinician: 7/3/2024   Last telemedicine visit with prescribing clinician: Visit date not found   Next office visit with prescribing clinician: 11/6/2024     Does the patient have less than a 3 day supply:  [x] Yes  [] No    Would you like a call back once the refill request has been completed: [] Yes [] No    If the office needs to give you a call back, can they leave a voicemail: [] Yes [] No    Shelbi Chung   07/16/24 15:40 EDT

## 2024-07-16 NOTE — TELEPHONE ENCOUNTER
Caller: Luzma Dick    Relationship: Self    Best call back number: [phone]     Requested Prescriptions:   Requested Prescriptions     Pending Prescriptions Disp Refills    carvedilol (COREG) 12.5 MG tablet 180 tablet 3     Sig: Take 1 tablet by mouth 2 (Two) Times a Day With Meals.    Januvia 50 MG tablet 90 tablet 3     Sig: Take 1 tablet by mouth Daily.        Pharmacy where request should be sent: [place] [facility] [phone] [place], [place] [phone]      Last office visit with prescribing clinician: 7/3/2024   Last telemedicine visit with prescribing clinician: Visit date not found   Next office visit with prescribing clinician: 11/6/2024       Does the patient have less than a 3 day supply:  [] Yes  [x] No    Would you like a call back once the refill request has been completed: [] Yes [] No    If the office needs to give you a call back, can they leave a voicemail: [] Yes [] No    Shelbi Sin Rep   07/16/24 15:57 EDT

## 2024-07-17 RX ORDER — ZOLPIDEM TARTRATE 5 MG/1
5 TABLET ORAL NIGHTLY PRN
Qty: 90 TABLET | Refills: 1 | Status: SHIPPED | OUTPATIENT
Start: 2024-07-17

## 2024-08-07 RX ORDER — MONTELUKAST SODIUM 4 MG/1
1 TABLET, CHEWABLE ORAL 2 TIMES DAILY
Qty: 180 TABLET | Refills: 3 | Status: SHIPPED | OUTPATIENT
Start: 2024-08-07

## 2024-08-20 ENCOUNTER — TELEPHONE (OUTPATIENT)
Dept: INTERNAL MEDICINE | Age: 84
End: 2024-08-20
Payer: MEDICARE

## 2024-08-20 NOTE — TELEPHONE ENCOUNTER
[facility] is no longer carrying Trulicity at their pharmacy. Pt is requesting an alternative med be sent in to [facility].

## 2024-08-20 NOTE — TELEPHONE ENCOUNTER
"Called the pt to inform her [facility] will not do a biopsy on her neck, they will send her referral to [facility] and someone from that office should be calling her. Pt said she would still like to see  but I told her he wont see for her diagnosis, that's why the office called me. Pt said she would only like to see  for any surgery and I said \"okay\" b/c I told her what was going on and that [facility] wont be calling her. FYI  " Okay, tell her I sent in the equivalent dose of Ozempic to the pharmacy at [facility]

## 2024-09-10 ENCOUNTER — LAB (OUTPATIENT)
Dept: LAB | Facility: HOSPITAL | Age: 84
End: 2024-09-10
Payer: MEDICARE

## 2024-09-10 ENCOUNTER — TRANSCRIBE ORDERS (OUTPATIENT)
Dept: ADMINISTRATIVE | Facility: HOSPITAL | Age: 84
End: 2024-09-10
Payer: MEDICARE

## 2024-09-10 DIAGNOSIS — I10 ESSENTIAL HYPERTENSION, MALIGNANT: ICD-10-CM

## 2024-09-10 DIAGNOSIS — N18.4 CHRONIC KIDNEY DISEASE (CKD), STAGE IV (SEVERE): Primary | ICD-10-CM

## 2024-09-10 DIAGNOSIS — E03.9 HYPOTHYROIDISM, UNSPECIFIED TYPE: ICD-10-CM

## 2024-09-10 DIAGNOSIS — N18.4 CHRONIC KIDNEY DISEASE (CKD), STAGE IV (SEVERE): ICD-10-CM

## 2024-09-10 DIAGNOSIS — E78.2 MIXED HYPERLIPIDEMIA: ICD-10-CM

## 2024-09-10 DIAGNOSIS — E55.9 VITAMIN D DEFICIENCY: ICD-10-CM

## 2024-09-10 LAB
25(OH)D3 SERPL-MCNC: 37.4 NG/ML (ref 30–100)
ALBUMIN SERPL-MCNC: 4 G/DL (ref 3.5–5.2)
ANION GAP SERPL CALCULATED.3IONS-SCNC: 10.7 MMOL/L (ref 5–15)
BACTERIA UR QL AUTO: ABNORMAL /HPF
BASOPHILS # BLD AUTO: 0.02 10*3/MM3 (ref 0–0.2)
BASOPHILS NFR BLD AUTO: 0.3 % (ref 0–1.5)
BILIRUB UR QL STRIP: NEGATIVE
BUN SERPL-MCNC: 44 MG/DL (ref 8–23)
BUN/CREAT SERPL: 20.1 (ref 7–25)
CALCIUM SPEC-SCNC: 10 MG/DL (ref 8.6–10.5)
CHLORIDE SERPL-SCNC: 105 MMOL/L (ref 98–107)
CLARITY UR: CLEAR
CO2 SERPL-SCNC: 20.3 MMOL/L (ref 22–29)
COLOR UR: YELLOW
CREAT SERPL-MCNC: 2.19 MG/DL (ref 0.57–1)
CREAT UR-MCNC: 86.9 MG/DL
DEPRECATED RDW RBC AUTO: 46.1 FL (ref 37–54)
EGFRCR SERPLBLD CKD-EPI 2021: 21.7 ML/MIN/1.73
EOSINOPHIL # BLD AUTO: 0.22 10*3/MM3 (ref 0–0.4)
EOSINOPHIL NFR BLD AUTO: 2.8 % (ref 0.3–6.2)
ERYTHROCYTE [DISTWIDTH] IN BLOOD BY AUTOMATED COUNT: 14.5 % (ref 12.3–15.4)
GLUCOSE SERPL-MCNC: 84 MG/DL (ref 65–99)
GLUCOSE UR STRIP-MCNC: NEGATIVE MG/DL
HCT VFR BLD AUTO: 29.7 % (ref 34–46.6)
HGB BLD-MCNC: 9.9 G/DL (ref 12–15.9)
HGB UR QL STRIP.AUTO: NEGATIVE
HYALINE CASTS UR QL AUTO: ABNORMAL /LPF
IMM GRANULOCYTES # BLD AUTO: 0.06 10*3/MM3 (ref 0–0.05)
IMM GRANULOCYTES NFR BLD AUTO: 0.8 % (ref 0–0.5)
KETONES UR QL STRIP: NEGATIVE
LEUKOCYTE ESTERASE UR QL STRIP.AUTO: NEGATIVE
LYMPHOCYTES # BLD AUTO: 2.49 10*3/MM3 (ref 0.7–3.1)
LYMPHOCYTES NFR BLD AUTO: 32.2 % (ref 19.6–45.3)
MCH RBC QN AUTO: 28.9 PG (ref 26.6–33)
MCHC RBC AUTO-ENTMCNC: 33.3 G/DL (ref 31.5–35.7)
MCV RBC AUTO: 86.8 FL (ref 79–97)
MONOCYTES # BLD AUTO: 0.53 10*3/MM3 (ref 0.1–0.9)
MONOCYTES NFR BLD AUTO: 6.8 % (ref 5–12)
NEUTROPHILS NFR BLD AUTO: 4.42 10*3/MM3 (ref 1.7–7)
NEUTROPHILS NFR BLD AUTO: 57.1 % (ref 42.7–76)
NITRITE UR QL STRIP: NEGATIVE
NRBC BLD AUTO-RTO: 0 /100 WBC (ref 0–0.2)
PH UR STRIP.AUTO: 6 [PH] (ref 5–8)
PHOSPHATE SERPL-MCNC: 3.2 MG/DL (ref 2.5–4.5)
PLATELET # BLD AUTO: 208 10*3/MM3 (ref 140–450)
PMV BLD AUTO: 9.5 FL (ref 6–12)
POTASSIUM SERPL-SCNC: 4.4 MMOL/L (ref 3.5–5.2)
PROT ?TM UR-MCNC: 11.5 MG/DL
PROT UR QL STRIP: NEGATIVE
PROT/CREAT UR: 0.13 MG/G{CREAT}
PTH-INTACT SERPL-MCNC: 46.7 PG/ML (ref 15–65)
RBC # BLD AUTO: 3.42 10*6/MM3 (ref 3.77–5.28)
RBC # UR STRIP: ABNORMAL /HPF
REF LAB TEST METHOD: ABNORMAL
SODIUM SERPL-SCNC: 136 MMOL/L (ref 136–145)
SP GR UR STRIP: 1.01 (ref 1–1.03)
SQUAMOUS #/AREA URNS HPF: ABNORMAL /HPF
UROBILINOGEN UR QL STRIP: NORMAL
WBC # UR STRIP: ABNORMAL /HPF
WBC NRBC COR # BLD AUTO: 7.74 10*3/MM3 (ref 3.4–10.8)

## 2024-09-10 PROCEDURE — 80069 RENAL FUNCTION PANEL: CPT

## 2024-09-10 PROCEDURE — 82306 VITAMIN D 25 HYDROXY: CPT

## 2024-09-10 PROCEDURE — 36415 COLL VENOUS BLD VENIPUNCTURE: CPT

## 2024-09-10 PROCEDURE — 81001 URINALYSIS AUTO W/SCOPE: CPT

## 2024-09-10 PROCEDURE — 82570 ASSAY OF URINE CREATININE: CPT

## 2024-09-10 PROCEDURE — 83970 ASSAY OF PARATHORMONE: CPT

## 2024-09-10 PROCEDURE — 84156 ASSAY OF PROTEIN URINE: CPT

## 2024-09-10 PROCEDURE — 85025 COMPLETE CBC W/AUTO DIFF WBC: CPT

## 2024-10-15 DIAGNOSIS — I10 HYPERTENSION, ESSENTIAL: Primary | ICD-10-CM

## 2024-10-15 RX ORDER — DOXAZOSIN 8 MG/1
8 TABLET ORAL NIGHTLY
Qty: 90 TABLET | Refills: 3 | Status: SHIPPED | OUTPATIENT
Start: 2024-10-15

## 2024-10-15 RX ORDER — HYDRALAZINE HYDROCHLORIDE 25 MG/1
25 TABLET, FILM COATED ORAL 3 TIMES DAILY
Qty: 270 TABLET | Refills: 3 | Status: SHIPPED | OUTPATIENT
Start: 2024-10-15

## 2024-10-15 NOTE — TELEPHONE ENCOUNTER
Caller: Luzma Dick    Relationship: Self    Best call back number: [phone]    Requested Prescriptions:   Requested Prescriptions     Pending Prescriptions Disp Refills    doxazosin (CARDURA) 8 MG tablet 90 tablet 3     Sig: Take 1 tablet by mouth Every Night.        Pharmacy where request should be sent: [place] [facility] [phone] [place] [phone]      Last office visit with prescribing clinician: 7/3/2024   Last telemedicine visit with prescribing clinician: Visit date not found   Next office visit with prescribing clinician: 11/6/2024     Additional details provided by patient:     Does the patient have less than a 3 day supply:  [] Yes  [x] No    Would you like a call back once the refill request has been completed: [x] Yes [] No    If the office needs to give you a call back, can they leave a voicemail: [] Yes [] No    Shelbi Garcia Rep   10/15/24 12:46 EDT

## 2024-10-15 NOTE — TELEPHONE ENCOUNTER
Caller: Luzma Dick    Relationship: Self    Best call back number: [phone]     Requested Prescriptions:   Requested Prescriptions     Pending Prescriptions Disp Refills    hydrALAZINE (APRESOLINE) 25 MG tablet 270 tablet 3     Sig: Take 1 tablet by mouth 3 (Three) Times a Day.        Pharmacy where request should be sent: [place] [facility] [phone] [place] [phone]      Last office visit with prescribing clinician: 7/3/2024   Last telemedicine visit with prescribing clinician: Visit date not found   Next office visit with prescribing clinician: 11/6/2024         Does the patient have less than a 3 day supply:  [x] Yes  [] No    Would you like a call back once the refill request has been completed: [x] Yes [] No    If the office needs to give you a call back, can they leave a voicemail: [x] Yes [] No    Shelbi Trent Rep   10/15/24 12:30 EDT

## 2024-11-04 ENCOUNTER — LAB (OUTPATIENT)
Dept: INTERNAL MEDICINE | Age: 84
End: 2024-11-04
Payer: MEDICARE

## 2024-11-04 DIAGNOSIS — E11.9 TYPE 2 DIABETES MELLITUS WITHOUT COMPLICATION, WITHOUT LONG-TERM CURRENT USE OF INSULIN: ICD-10-CM

## 2024-11-04 DIAGNOSIS — F41.9 ANXIETY: ICD-10-CM

## 2024-11-04 DIAGNOSIS — N18.32 STAGE 3B CHRONIC KIDNEY DISEASE: ICD-10-CM

## 2024-11-04 DIAGNOSIS — E66.01 MORBID (SEVERE) OBESITY DUE TO EXCESS CALORIES: ICD-10-CM

## 2024-11-04 DIAGNOSIS — C50.919 MALIGNANT NEOPLASM OF FEMALE BREAST, UNSPECIFIED ESTROGEN RECEPTOR STATUS, UNSPECIFIED LATERALITY, UNSPECIFIED SITE OF BREAST: ICD-10-CM

## 2024-11-04 DIAGNOSIS — D50.9 IRON DEFICIENCY ANEMIA, UNSPECIFIED IRON DEFICIENCY ANEMIA TYPE: ICD-10-CM

## 2024-11-04 DIAGNOSIS — R74.8 ELEVATED LIVER ENZYMES: ICD-10-CM

## 2024-11-04 DIAGNOSIS — E06.3 HYPOTHYROIDISM DUE TO HASHIMOTO'S THYROIDITIS: ICD-10-CM

## 2024-11-04 DIAGNOSIS — I48.11 LONGSTANDING PERSISTENT ATRIAL FIBRILLATION: ICD-10-CM

## 2024-11-04 DIAGNOSIS — N18.4 ANEMIA OF CHRONIC RENAL FAILURE, STAGE 4 (SEVERE): ICD-10-CM

## 2024-11-04 DIAGNOSIS — F51.01 PRIMARY INSOMNIA: ICD-10-CM

## 2024-11-04 DIAGNOSIS — Z00.00 MEDICARE ANNUAL WELLNESS VISIT, SUBSEQUENT: ICD-10-CM

## 2024-11-04 DIAGNOSIS — E55.9 VITAMIN D DEFICIENCY: ICD-10-CM

## 2024-11-04 DIAGNOSIS — D63.1 ANEMIA OF CHRONIC RENAL FAILURE, STAGE 4 (SEVERE): ICD-10-CM

## 2024-11-04 DIAGNOSIS — I10 HYPERTENSION, ESSENTIAL: ICD-10-CM

## 2024-11-04 LAB
ALBUMIN SERPL-MCNC: 3.6 G/DL (ref 3.5–5.2)
ALBUMIN/GLOB SERPL: 1.1 G/DL
ALP SERPL-CCNC: 110 U/L (ref 39–117)
ALT SERPL W P-5'-P-CCNC: 6 U/L (ref 1–33)
ANION GAP SERPL CALCULATED.3IONS-SCNC: 10.8 MMOL/L (ref 5–15)
AST SERPL-CCNC: 13 U/L (ref 1–32)
BASOPHILS # BLD AUTO: 0.02 10*3/MM3 (ref 0–0.2)
BASOPHILS NFR BLD AUTO: 0.3 % (ref 0–1.5)
BILIRUB SERPL-MCNC: 0.2 MG/DL (ref 0–1.2)
BUN SERPL-MCNC: 37 MG/DL (ref 8–23)
BUN/CREAT SERPL: 13.4 (ref 7–25)
CALCIUM SPEC-SCNC: 9.9 MG/DL (ref 8.6–10.5)
CHLORIDE SERPL-SCNC: 108 MMOL/L (ref 98–107)
CHOLEST SERPL-MCNC: 200 MG/DL (ref 0–200)
CO2 SERPL-SCNC: 19.2 MMOL/L (ref 22–29)
CREAT SERPL-MCNC: 2.76 MG/DL (ref 0.57–1)
DEPRECATED RDW RBC AUTO: 46.6 FL (ref 37–54)
EGFRCR SERPLBLD CKD-EPI 2021: 16.5 ML/MIN/1.73
EOSINOPHIL # BLD AUTO: 0.22 10*3/MM3 (ref 0–0.4)
EOSINOPHIL NFR BLD AUTO: 2.8 % (ref 0.3–6.2)
ERYTHROCYTE [DISTWIDTH] IN BLOOD BY AUTOMATED COUNT: 14.6 % (ref 12.3–15.4)
GLOBULIN UR ELPH-MCNC: 3.4 GM/DL
GLUCOSE SERPL-MCNC: 116 MG/DL (ref 65–99)
HBA1C MFR BLD: 5.4 % (ref 4.8–5.6)
HCT VFR BLD AUTO: 30 % (ref 34–46.6)
HDLC SERPL-MCNC: 38 MG/DL (ref 40–60)
HGB BLD-MCNC: 9.5 G/DL (ref 12–15.9)
IMM GRANULOCYTES # BLD AUTO: 0.04 10*3/MM3 (ref 0–0.05)
IMM GRANULOCYTES NFR BLD AUTO: 0.5 % (ref 0–0.5)
LDLC SERPL CALC-MCNC: 122 MG/DL (ref 0–100)
LDLC/HDLC SERPL: 3.06 {RATIO}
LYMPHOCYTES # BLD AUTO: 2.72 10*3/MM3 (ref 0.7–3.1)
LYMPHOCYTES NFR BLD AUTO: 35.1 % (ref 19.6–45.3)
MCH RBC QN AUTO: 27.8 PG (ref 26.6–33)
MCHC RBC AUTO-ENTMCNC: 31.7 G/DL (ref 31.5–35.7)
MCV RBC AUTO: 87.7 FL (ref 79–97)
MONOCYTES # BLD AUTO: 0.47 10*3/MM3 (ref 0.1–0.9)
MONOCYTES NFR BLD AUTO: 6.1 % (ref 5–12)
NEUTROPHILS NFR BLD AUTO: 4.29 10*3/MM3 (ref 1.7–7)
NEUTROPHILS NFR BLD AUTO: 55.2 % (ref 42.7–76)
NRBC BLD AUTO-RTO: 0 /100 WBC (ref 0–0.2)
PLATELET # BLD AUTO: 239 10*3/MM3 (ref 140–450)
PMV BLD AUTO: 9.7 FL (ref 6–12)
POTASSIUM SERPL-SCNC: 4.4 MMOL/L (ref 3.5–5.2)
PROT SERPL-MCNC: 7 G/DL (ref 6–8.5)
RBC # BLD AUTO: 3.42 10*6/MM3 (ref 3.77–5.28)
SODIUM SERPL-SCNC: 138 MMOL/L (ref 136–145)
T4 FREE SERPL-MCNC: 1.24 NG/DL (ref 0.92–1.68)
TRIGL SERPL-MCNC: 229 MG/DL (ref 0–150)
TSH SERPL DL<=0.05 MIU/L-ACNC: 2.54 UIU/ML (ref 0.27–4.2)
URATE SERPL-MCNC: 5.8 MG/DL (ref 2.4–5.7)
VLDLC SERPL-MCNC: 40 MG/DL (ref 5–40)
WBC NRBC COR # BLD AUTO: 7.76 10*3/MM3 (ref 3.4–10.8)

## 2024-11-04 PROCEDURE — 80061 LIPID PANEL: CPT | Performed by: INTERNAL MEDICINE

## 2024-11-04 PROCEDURE — 84439 ASSAY OF FREE THYROXINE: CPT | Performed by: INTERNAL MEDICINE

## 2024-11-04 PROCEDURE — 80053 COMPREHEN METABOLIC PANEL: CPT | Performed by: INTERNAL MEDICINE

## 2024-11-04 PROCEDURE — 84443 ASSAY THYROID STIM HORMONE: CPT | Performed by: INTERNAL MEDICINE

## 2024-11-04 PROCEDURE — 36415 COLL VENOUS BLD VENIPUNCTURE: CPT | Performed by: INTERNAL MEDICINE

## 2024-11-04 PROCEDURE — 83036 HEMOGLOBIN GLYCOSYLATED A1C: CPT | Performed by: INTERNAL MEDICINE

## 2024-11-04 PROCEDURE — 84550 ASSAY OF BLOOD/URIC ACID: CPT | Performed by: INTERNAL MEDICINE

## 2024-11-04 PROCEDURE — 85025 COMPLETE CBC W/AUTO DIFF WBC: CPT | Performed by: INTERNAL MEDICINE

## 2024-11-06 ENCOUNTER — TELEPHONE (OUTPATIENT)
Dept: INTERNAL MEDICINE | Age: 84
End: 2024-11-06

## 2024-11-06 ENCOUNTER — OFFICE VISIT (OUTPATIENT)
Dept: INTERNAL MEDICINE | Age: 84
End: 2024-11-06
Payer: MEDICARE

## 2024-11-06 VITALS
DIASTOLIC BLOOD PRESSURE: 63 MMHG | TEMPERATURE: 98.4 F | OXYGEN SATURATION: 94 % | HEART RATE: 104 BPM | BODY MASS INDEX: 34.87 KG/M2 | WEIGHT: 217 LBS | SYSTOLIC BLOOD PRESSURE: 123 MMHG | HEIGHT: 66 IN

## 2024-11-06 DIAGNOSIS — I48.11 LONGSTANDING PERSISTENT ATRIAL FIBRILLATION: ICD-10-CM

## 2024-11-06 DIAGNOSIS — D63.1 ANEMIA OF CHRONIC RENAL FAILURE, STAGE 4 (SEVERE): ICD-10-CM

## 2024-11-06 DIAGNOSIS — F41.9 ANXIETY: ICD-10-CM

## 2024-11-06 DIAGNOSIS — E55.9 VITAMIN D DEFICIENCY: ICD-10-CM

## 2024-11-06 DIAGNOSIS — N18.4 TYPE 2 DIABETES MELLITUS WITH STAGE 4 CHRONIC KIDNEY DISEASE, WITHOUT LONG-TERM CURRENT USE OF INSULIN: Primary | ICD-10-CM

## 2024-11-06 DIAGNOSIS — E11.22 TYPE 2 DIABETES MELLITUS WITH STAGE 4 CHRONIC KIDNEY DISEASE, WITHOUT LONG-TERM CURRENT USE OF INSULIN: Primary | ICD-10-CM

## 2024-11-06 DIAGNOSIS — C50.411 MALIGNANT NEOPLASM OF UPPER-OUTER QUADRANT OF RIGHT FEMALE BREAST, UNSPECIFIED ESTROGEN RECEPTOR STATUS: ICD-10-CM

## 2024-11-06 DIAGNOSIS — I10 HYPERTENSION, ESSENTIAL: ICD-10-CM

## 2024-11-06 DIAGNOSIS — N18.30 STAGE 3 CHRONIC KIDNEY DISEASE, UNSPECIFIED WHETHER STAGE 3A OR 3B CKD: ICD-10-CM

## 2024-11-06 DIAGNOSIS — R01.1 HEART MURMUR, SYSTOLIC: ICD-10-CM

## 2024-11-06 DIAGNOSIS — E66.01 MORBID (SEVERE) OBESITY DUE TO EXCESS CALORIES: ICD-10-CM

## 2024-11-06 DIAGNOSIS — Z23 NEED FOR VACCINATION: ICD-10-CM

## 2024-11-06 DIAGNOSIS — E06.3 HYPOTHYROIDISM DUE TO HASHIMOTO'S THYROIDITIS: ICD-10-CM

## 2024-11-06 DIAGNOSIS — N18.4 ANEMIA OF CHRONIC RENAL FAILURE, STAGE 4 (SEVERE): ICD-10-CM

## 2024-11-06 DIAGNOSIS — F51.01 PRIMARY INSOMNIA: ICD-10-CM

## 2024-11-06 DIAGNOSIS — C50.919 MALIGNANT NEOPLASM OF FEMALE BREAST, UNSPECIFIED ESTROGEN RECEPTOR STATUS, UNSPECIFIED LATERALITY, UNSPECIFIED SITE OF BREAST: ICD-10-CM

## 2024-11-06 DIAGNOSIS — D50.9 IRON DEFICIENCY ANEMIA, UNSPECIFIED IRON DEFICIENCY ANEMIA TYPE: ICD-10-CM

## 2024-11-06 PROCEDURE — G0008 ADMIN INFLUENZA VIRUS VAC: HCPCS | Performed by: INTERNAL MEDICINE

## 2024-11-06 PROCEDURE — 99214 OFFICE O/P EST MOD 30 MIN: CPT | Performed by: INTERNAL MEDICINE

## 2024-11-06 PROCEDURE — 3078F DIAST BP <80 MM HG: CPT | Performed by: INTERNAL MEDICINE

## 2024-11-06 PROCEDURE — 3074F SYST BP LT 130 MM HG: CPT | Performed by: INTERNAL MEDICINE

## 2024-11-06 PROCEDURE — 90662 IIV NO PRSV INCREASED AG IM: CPT | Performed by: INTERNAL MEDICINE

## 2024-11-06 PROCEDURE — 1126F AMNT PAIN NOTED NONE PRSNT: CPT | Performed by: INTERNAL MEDICINE

## 2024-11-06 NOTE — PROGRESS NOTES
"CHIEF COMPLAINT/ HPI:  Hypertension (Routine follow up, Lab follow up. Flu shot request. )    Follow-up with diabetes says she is doing well she is around a lot of little kid she had questions about vaccines needs her flu shot today,          Objective   Vital Signs  Vitals:    11/06/24 1249   BP: 123/63   BP Location: Right arm   Patient Position: Sitting   Pulse: 104   Temp: 98.4 °F (36.9 °C)   SpO2: 94%   Weight: 98.4 kg (217 lb)   Height: 166.4 cm (65.51\")      Body mass index is 35.55 kg/m².  Review of Systems   Constitutional: Negative.    HENT: Negative.     Eyes: Negative.    Respiratory: Negative.     Cardiovascular: Negative.    Gastrointestinal: Negative.    Endocrine: Negative.    Genitourinary: Negative.    Musculoskeletal: Negative.    Allergic/Immunologic: Negative.    Neurological: Negative.    Hematological: Negative.    Psychiatric/Behavioral: Negative.        Physical Exam  Constitutional:       General: She is not in acute distress.     Appearance: Normal appearance. She is obese.   HENT:      Head: Normocephalic.      Mouth/Throat:      Mouth: Mucous membranes are moist.   Eyes:      Conjunctiva/sclera: Conjunctivae normal.      Pupils: Pupils are equal, round, and reactive to light.   Cardiovascular:      Rate and Rhythm: Normal rate and regular rhythm.      Pulses: Normal pulses.      Heart sounds: Murmur heard.   Pulmonary:      Effort: Pulmonary effort is normal.      Breath sounds: Normal breath sounds.   Abdominal:      General: Bowel sounds are normal.      Palpations: Abdomen is soft.   Musculoskeletal:         General: No swelling. Normal range of motion.      Cervical back: Neck supple.   Skin:     General: Skin is warm and dry.      Coloration: Skin is not jaundiced.   Neurological:      General: No focal deficit present.      Mental Status: She is alert and oriented to person, place, and time. Mental status is at baseline.   Psychiatric:         Mood and Affect: Mood normal.         " Behavior: Behavior normal.         Thought Content: Thought content normal.         Judgment: Judgment normal.        Result Review :   Lab Results   Component Value Date    PROBNP 823.1 09/13/2021     01/09/2020     CMP          7/16/2024    10:30 9/10/2024    10:54 11/4/2024    10:22   CMP   Glucose 120  84  116    BUN 46  44  37    Creatinine 2.51  2.19  2.76    EGFR 18.4  21.7  16.5    Sodium 139  136  138    Potassium 4.3  4.4  4.4    Chloride 107  105  108    Calcium 10.1  10.0  9.9    Total Protein   7.0    Albumin  4.0  3.6    Globulin   3.4    Total Bilirubin   0.2    Alkaline Phosphatase   110    AST (SGOT)   13    ALT (SGPT)   6    Albumin/Globulin Ratio   1.1    BUN/Creatinine Ratio 18.3  20.1  13.4    Anion Gap 13.0  10.7  10.8      CBC w/diff          5/30/2024    10:30 9/10/2024    10:54 11/4/2024    10:22   CBC w/Diff   WBC 6.80  7.74  7.76    RBC 3.44  3.42  3.42    Hemoglobin 9.9  9.9  9.5    Hematocrit 30.3  29.7  30.0    MCV 88.1  86.8  87.7    MCH 28.8  28.9  27.8    MCHC 32.7  33.3  31.7    RDW 14.4  14.5  14.6    Platelets 191  208  239    Neutrophil Rel % 56.1  57.1  55.2    Immature Granulocyte Rel % 0.6  0.8  0.5    Lymphocyte Rel % 33.7  32.2  35.1    Monocyte Rel % 6.2  6.8  6.1    Eosinophil Rel % 3.1  2.8  2.8    Basophil Rel % 0.3  0.3  0.3       Lipid Panel          1/29/2024    10:34 5/30/2024    10:30 11/4/2024    10:22   Lipid Panel   Total Cholesterol 165  207  200    Triglycerides 209  283  229    HDL Cholesterol 40  42  38    VLDL Cholesterol 35  49  40    LDL Cholesterol  90  116  122    LDL/HDL Ratio 2.08  2.58  3.06       Lab Results   Component Value Date    TSH 2.540 11/04/2024    TSH 2.550 01/29/2024    TSH 3.120 08/01/2023      Lab Results   Component Value Date    FREET4 1.24 11/04/2024    FREET4 1.12 01/29/2024    FREET4 1.07 08/01/2023      A1C Last 3 Results          1/29/2024    10:34 5/30/2024    10:30 11/4/2024    10:22   HGBA1C Last 3 Results   Hemoglobin  A1C 7.30  6.50  5.40                        Visit Diagnoses:    ICD-10-CM ICD-9-CM   1. Type 2 diabetes mellitus with stage 4 chronic kidney disease, without long-term current use of insulin  E11.22 250.40    N18.4 585.4   2. Primary insomnia  F51.01 307.42   3. Anxiety  F41.9 300.00   4. Iron deficiency anemia, unspecified iron deficiency anemia type  D50.9 280.9   5. Stage 3 chronic kidney disease, unspecified whether stage 3a or 3b CKD  N18.30 585.3   6. Vitamin D deficiency  E55.9 268.9   7. Longstanding persistent atrial fibrillation  I48.11 427.31   8. Malignant neoplasm of upper-outer quadrant of right female breast, unspecified estrogen receptor status  C50.411 174.4   9. Anemia of chronic renal failure, stage 4 (severe)  N18.4 285.21    D63.1 585.4   10. Malignant neoplasm of female breast, unspecified estrogen receptor status, unspecified laterality, unspecified site of breast  C50.919 174.9   11. Morbid (severe) obesity due to excess calories  E66.01 278.01   12. Hypertension, essential  I10 401.9   13. Hypothyroidism due to Hashimoto's thyroiditis  E06.3 245.2   14. Heart murmur, systolic  R01.1 785.2       Assessment and Plan   Diagnoses and all orders for this visit:    1. Type 2 diabetes mellitus with stage 4 chronic kidney disease, without long-term current use of insulin (Primary)  -     Lipid Panel; Future  -     Hemoglobin A1c; Future  -     Comprehensive Metabolic Panel; Future  -     CBC & Differential; Future  -     Ferritin; Future  -     Iron Profile; Future  -     Vitamin D,25-Hydroxy; Future  -     TSH+Free T4; Future  -     MicroAlbumin, Urine, Random - Urine, Clean Catch; Future    2. Primary insomnia  -     Lipid Panel; Future  -     Hemoglobin A1c; Future  -     Comprehensive Metabolic Panel; Future  -     CBC & Differential; Future  -     Ferritin; Future  -     Iron Profile; Future  -     Vitamin D,25-Hydroxy; Future  -     TSH+Free T4; Future  -     MicroAlbumin, Urine, Random -  Urine, Clean Catch; Future    3. Anxiety  -     Lipid Panel; Future  -     Hemoglobin A1c; Future  -     Comprehensive Metabolic Panel; Future  -     CBC & Differential; Future  -     Ferritin; Future  -     Iron Profile; Future  -     Vitamin D,25-Hydroxy; Future  -     TSH+Free T4; Future  -     MicroAlbumin, Urine, Random - Urine, Clean Catch; Future    4. Iron deficiency anemia, unspecified iron deficiency anemia type  -     Lipid Panel; Future  -     Hemoglobin A1c; Future  -     Comprehensive Metabolic Panel; Future  -     CBC & Differential; Future  -     Ferritin; Future  -     Iron Profile; Future  -     Vitamin D,25-Hydroxy; Future  -     TSH+Free T4; Future  -     MicroAlbumin, Urine, Random - Urine, Clean Catch; Future    5. Stage 3 chronic kidney disease, unspecified whether stage 3a or 3b CKD  -     Lipid Panel; Future  -     Hemoglobin A1c; Future  -     Comprehensive Metabolic Panel; Future  -     CBC & Differential; Future  -     Ferritin; Future  -     Iron Profile; Future  -     Vitamin D,25-Hydroxy; Future  -     TSH+Free T4; Future  -     MicroAlbumin, Urine, Random - Urine, Clean Catch; Future    6. Vitamin D deficiency  -     Lipid Panel; Future  -     Hemoglobin A1c; Future  -     Comprehensive Metabolic Panel; Future  -     CBC & Differential; Future  -     Ferritin; Future  -     Iron Profile; Future  -     Vitamin D,25-Hydroxy; Future  -     TSH+Free T4; Future  -     MicroAlbumin, Urine, Random - Urine, Clean Catch; Future    7. Longstanding persistent atrial fibrillation  -     Lipid Panel; Future  -     Hemoglobin A1c; Future  -     Comprehensive Metabolic Panel; Future  -     CBC & Differential; Future  -     Ferritin; Future  -     Iron Profile; Future  -     Vitamin D,25-Hydroxy; Future  -     TSH+Free T4; Future  -     MicroAlbumin, Urine, Random - Urine, Clean Catch; Future    8. Malignant neoplasm of upper-outer quadrant of right female breast, unspecified estrogen receptor  status  -     Lipid Panel; Future  -     Hemoglobin A1c; Future  -     Comprehensive Metabolic Panel; Future  -     CBC & Differential; Future  -     Ferritin; Future  -     Iron Profile; Future  -     Vitamin D,25-Hydroxy; Future  -     TSH+Free T4; Future  -     MicroAlbumin, Urine, Random - Urine, Clean Catch; Future    9. Anemia of chronic renal failure, stage 4 (severe)  -     Lipid Panel; Future  -     Hemoglobin A1c; Future  -     Comprehensive Metabolic Panel; Future  -     CBC & Differential; Future  -     Ferritin; Future  -     Iron Profile; Future  -     Vitamin D,25-Hydroxy; Future  -     TSH+Free T4; Future  -     MicroAlbumin, Urine, Random - Urine, Clean Catch; Future    10. Malignant neoplasm of female breast, unspecified estrogen receptor status, unspecified laterality, unspecified site of breast  -     Lipid Panel; Future  -     Hemoglobin A1c; Future  -     Comprehensive Metabolic Panel; Future  -     CBC & Differential; Future  -     Ferritin; Future  -     Iron Profile; Future  -     Vitamin D,25-Hydroxy; Future  -     TSH+Free T4; Future  -     MicroAlbumin, Urine, Random - Urine, Clean Catch; Future    11. Morbid (severe) obesity due to excess calories  -     Lipid Panel; Future  -     Hemoglobin A1c; Future  -     Comprehensive Metabolic Panel; Future  -     CBC & Differential; Future  -     Ferritin; Future  -     Iron Profile; Future  -     Vitamin D,25-Hydroxy; Future  -     TSH+Free T4; Future  -     MicroAlbumin, Urine, Random - Urine, Clean Catch; Future    12. Hypertension, essential  -     Lipid Panel; Future  -     Hemoglobin A1c; Future  -     Comprehensive Metabolic Panel; Future  -     CBC & Differential; Future  -     Ferritin; Future  -     Iron Profile; Future  -     Vitamin D,25-Hydroxy; Future  -     TSH+Free T4; Future  -     MicroAlbumin, Urine, Random - Urine, Clean Catch; Future    13. Hypothyroidism due to Hashimoto's thyroiditis  -     Lipid Panel; Future  -      Hemoglobin A1c; Future  -     Comprehensive Metabolic Panel; Future  -     CBC & Differential; Future  -     Ferritin; Future  -     Iron Profile; Future  -     Vitamin D,25-Hydroxy; Future  -     TSH+Free T4; Future  -     MicroAlbumin, Urine, Random - Urine, Clean Catch; Future    14. Heart murmur, systolic  -     Adult Transthoracic Echo Complete W/ Cont if Necessary Per Protocol; Future        CKD 4 - CREAT --- 2.7, November 4, 2024    Patient had a 2.1 cm fluid collection with subcutaneous soft tissues of the left medial gluteal cleft with some inflammation suggesting a small abscess that was I&D in the emergency room April 4, 2024,    Annual wellness visit performed Agnes 3, 2024---    Iron deficiency anemia,, improved stable at 9.9 hematocrit 30.3 May 30, 2024=== got 2 iron infusions by nephrology end of January 2024------------- colonoscopy Dr. Lopez 2019 multiple polyps, patient received iron infusions July 2022 -----previously had iron infusions in 2019,     Heart murmur,----- echocardiogram shows only trace mitral regurgitation and normal ejection fraction October 2020,, repeat echo shows low normal ejection fraction 50% to 55% no regional wall motion abnormalities there was some diastolic dysfunction borderline left atrial enlargement mild to moderate mitral regurgitation but no significant valvular pathology, August 1, 2022, will do follow-up echo February 2025,    Subclinical hypothyroid---cont  Synthroid 0.05 mg daily     Diarrhea intermittent, uses colestipol as needed, still has her gallbladder, improved -----off metformin June 2024 now back on, patient is can to clarify medications as of November 6, 2024    s/p L TKA, 4/16,     chronic A. 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 guided removal December 3, 2014 with 4 sentinel lymph nodes removed all  negative,------ off anastrozole per oncology/ LYE and stable since  Dec 2020    HTN-, continues HYDRALAZINE 50 TID AND LOSARTAN 100/25 QD , DOXAZOSIN 4 MG QHS, COREG 12.5 BID, Cardizem  mg daily    B/L SHOULDER AND L KNEE OA--     GOUT- BETTER --continue ALLOPURINOL 300 mg daily    OBESE-MORBID    DM 2, HGA1C- --5.4 November 4, 2024--  cont Januvia 50 mg daily,, Ozempic 1 mg subcu weekly,   Metformin 500 mg extended release twice a day    EYES CHECK BLOOM / BENNET --MARCH 2021    ELEVATED LFTS, currently normal     VIT D DEF continues replacement     ELEVATED CHOL--patient intolerant of statins discussed,    INSOMNIA, continues Ambien nightly                 Follow Up   Return in about 4 months (around 3/6/2025).  Patient was given instructions and counseling regarding her condition or for health maintenance advice. Please see specific information pulled into the AVS if appropriate.

## 2024-11-06 NOTE — TELEPHONE ENCOUNTER
Caller: Luzma Dick    Relationship to patient: Self    Best call back number: [phone]     Patient is needing: PATIENT WAS SEEN ON 11.06.2024 AND TOLD TO CALL THE OFFICE WITH THE NAME OF THE DIABETIC/BLOOD SUGAR MEDICATION SHE HAS BEEN TAKING. PATIENT TAKING GLYBURIDE-METFORMIN (GLUCOVANCE) 2.5-500 MG PER TABLET. 1 TABLET BY MOUTH 2 TIMES A DAY WITH MEALS.

## 2024-12-13 ENCOUNTER — TELEPHONE (OUTPATIENT)
Dept: INTERNAL MEDICINE | Age: 84
End: 2024-12-13
Payer: MEDICARE

## 2024-12-13 NOTE — TELEPHONE ENCOUNTER
Caller: Luzma Dick    Relationship to patient: Self      Best call back number: [phone]    Provider: VIERA     Medication PA needed: Semaglutide, 1 MG/DOSE, (OZEMPIC) 4 MG/3ML solution pen-injector   [facility] [address] WILL NEED NEW SCRIPT SENT TO THEM AFTER PRIOR AUTHORIZATION IS APPROVED

## 2024-12-30 ENCOUNTER — TRANSCRIBE ORDERS (OUTPATIENT)
Dept: INTERNAL MEDICINE | Age: 84
End: 2024-12-30
Payer: MEDICARE

## 2024-12-30 DIAGNOSIS — Z12.31 SCREENING MAMMOGRAM FOR BREAST CANCER: Primary | ICD-10-CM

## 2025-01-02 ENCOUNTER — LAB (OUTPATIENT)
Dept: LAB | Facility: HOSPITAL | Age: 85
End: 2025-01-02
Payer: MEDICARE

## 2025-01-02 ENCOUNTER — TRANSCRIBE ORDERS (OUTPATIENT)
Dept: LAB | Facility: HOSPITAL | Age: 85
End: 2025-01-02
Payer: MEDICARE

## 2025-01-02 DIAGNOSIS — N18.4 CHRONIC KIDNEY DISEASE, STAGE IV (SEVERE): ICD-10-CM

## 2025-01-02 DIAGNOSIS — N18.4 CHRONIC KIDNEY DISEASE, STAGE IV (SEVERE): Primary | ICD-10-CM

## 2025-01-02 LAB
ALBUMIN SERPL-MCNC: 3.7 G/DL (ref 3.5–5.2)
ANION GAP SERPL CALCULATED.3IONS-SCNC: 7.6 MMOL/L (ref 5–15)
BASOPHILS # BLD AUTO: 0.02 10*3/MM3 (ref 0–0.2)
BASOPHILS NFR BLD AUTO: 0.2 % (ref 0–1.5)
BUN SERPL-MCNC: 34 MG/DL (ref 8–23)
BUN/CREAT SERPL: 15.4 (ref 7–25)
CALCIUM SPEC-SCNC: 9.8 MG/DL (ref 8.6–10.5)
CHLORIDE SERPL-SCNC: 111 MMOL/L (ref 98–107)
CO2 SERPL-SCNC: 19.4 MMOL/L (ref 22–29)
CREAT SERPL-MCNC: 2.21 MG/DL (ref 0.57–1)
DEPRECATED RDW RBC AUTO: 46.3 FL (ref 37–54)
EGFRCR SERPLBLD CKD-EPI 2021: 21.5 ML/MIN/1.73
EOSINOPHIL # BLD AUTO: 0.24 10*3/MM3 (ref 0–0.4)
EOSINOPHIL NFR BLD AUTO: 3 % (ref 0.3–6.2)
ERYTHROCYTE [DISTWIDTH] IN BLOOD BY AUTOMATED COUNT: 15.1 % (ref 12.3–15.4)
GLUCOSE SERPL-MCNC: 130 MG/DL (ref 65–99)
HCT VFR BLD AUTO: 29.6 % (ref 34–46.6)
HGB BLD-MCNC: 9.9 G/DL (ref 12–15.9)
IMM GRANULOCYTES # BLD AUTO: 0.03 10*3/MM3 (ref 0–0.05)
IMM GRANULOCYTES NFR BLD AUTO: 0.4 % (ref 0–0.5)
LYMPHOCYTES # BLD AUTO: 2.25 10*3/MM3 (ref 0.7–3.1)
LYMPHOCYTES NFR BLD AUTO: 28 % (ref 19.6–45.3)
MCH RBC QN AUTO: 28.4 PG (ref 26.6–33)
MCHC RBC AUTO-ENTMCNC: 33.4 G/DL (ref 31.5–35.7)
MCV RBC AUTO: 85.1 FL (ref 79–97)
MONOCYTES # BLD AUTO: 0.6 10*3/MM3 (ref 0.1–0.9)
MONOCYTES NFR BLD AUTO: 7.5 % (ref 5–12)
NEUTROPHILS NFR BLD AUTO: 4.89 10*3/MM3 (ref 1.7–7)
NEUTROPHILS NFR BLD AUTO: 60.9 % (ref 42.7–76)
NRBC BLD AUTO-RTO: 0 /100 WBC (ref 0–0.2)
PHOSPHATE SERPL-MCNC: 3.2 MG/DL (ref 2.5–4.5)
PLATELET # BLD AUTO: 234 10*3/MM3 (ref 140–450)
PMV BLD AUTO: 9.6 FL (ref 6–12)
POTASSIUM SERPL-SCNC: 4.7 MMOL/L (ref 3.5–5.2)
RBC # BLD AUTO: 3.48 10*6/MM3 (ref 3.77–5.28)
SODIUM SERPL-SCNC: 138 MMOL/L (ref 136–145)
WBC NRBC COR # BLD AUTO: 8.03 10*3/MM3 (ref 3.4–10.8)

## 2025-01-02 PROCEDURE — 36415 COLL VENOUS BLD VENIPUNCTURE: CPT

## 2025-01-02 PROCEDURE — 80069 RENAL FUNCTION PANEL: CPT

## 2025-01-02 PROCEDURE — 85025 COMPLETE CBC W/AUTO DIFF WBC: CPT

## 2025-01-22 DIAGNOSIS — F51.01 PRIMARY INSOMNIA: ICD-10-CM

## 2025-01-22 RX ORDER — ZOLPIDEM TARTRATE 5 MG/1
5 TABLET ORAL NIGHTLY PRN
Qty: 90 TABLET | Refills: 1 | Status: SHIPPED | OUTPATIENT
Start: 2025-01-22

## 2025-01-22 NOTE — TELEPHONE ENCOUNTER
Caller: Luzma Dick    Relationship: Self    Best call back number:     [phone]      Requested Prescriptions:   Requested Prescriptions     Pending Prescriptions Disp Refills    zolpidem (AMBIEN) 5 MG tablet 90 tablet 1     Sig: Take 1 tablet by mouth At Night As Needed for Sleep. for sleep        Pharmacy where request should be sent: [id number] - [place] - [address]  AT [facility] [address] - [phone]  - [phone] FX     Last office visit with prescribing clinician: 11/6/2024   Last telemedicine visit with prescribing clinician: Visit date not found   Next office visit with prescribing clinician: 3/6/2025     Additional details provided by patient:     Does the patient have less than a 3 day supply:  [x] Yes  [] No    Would you like a call back once the refill request has been completed: [] Yes [] No    If the office needs to give you a call back, can they leave a voicemail: [] Yes [] No    Shelbi Lopez Rep   01/22/25 10:53 EST

## 2025-01-27 ENCOUNTER — HOSPITAL ENCOUNTER (OUTPATIENT)
Dept: MAMMOGRAPHY | Facility: HOSPITAL | Age: 85
Discharge: HOME | End: 2025-01-27
Admitting: INTERNAL MEDICINE
Payer: MEDICARE

## 2025-01-27 DIAGNOSIS — Z12.31 SCREENING MAMMOGRAM FOR BREAST CANCER: ICD-10-CM

## 2025-01-27 PROCEDURE — 77063 BREAST TOMOSYNTHESIS BI: CPT

## 2025-01-27 PROCEDURE — 77067 SCR MAMMO BI INCL CAD: CPT

## 2025-02-10 ENCOUNTER — HOSPITAL ENCOUNTER (OUTPATIENT)
Facility: HOSPITAL | Age: 85
Discharge: HOME | End: 2025-02-10
Admitting: INTERNAL MEDICINE
Payer: MEDICARE

## 2025-02-10 DIAGNOSIS — R01.1 HEART MURMUR, SYSTOLIC: ICD-10-CM

## 2025-02-10 LAB
AV MEAN PRESS GRAD SYS DOP V1V2: 7 MMHG
AV VMAX SYS DOP: 172 CM/SEC
BH CV ECHO MEAS - ACS: 1.6 CM
BH CV ECHO MEAS - AO MAX PG: 11.8 MMHG
BH CV ECHO MEAS - AO ROOT DIAM: 2.7 CM
BH CV ECHO MEAS - AO V2 VTI: 29.8 CM
BH CV ECHO MEAS - AVA(I,D): 1.6 CM2
BH CV ECHO MEAS - EDV(CUBED): 97.3 ML
BH CV ECHO MEAS - EDV(MOD-SP2): 64.5 ML
BH CV ECHO MEAS - EDV(MOD-SP4): 86.6 ML
BH CV ECHO MEAS - EF(MOD-SP2): 60.3 %
BH CV ECHO MEAS - EF(MOD-SP4): 57.7 %
BH CV ECHO MEAS - ESV(CUBED): 35.9 ML
BH CV ECHO MEAS - ESV(MOD-SP2): 25.6 ML
BH CV ECHO MEAS - ESV(MOD-SP4): 36.6 ML
BH CV ECHO MEAS - FS: 28.3 %
BH CV ECHO MEAS - IVS/LVPW: 1 CM
BH CV ECHO MEAS - IVSD: 1 CM
BH CV ECHO MEAS - LA DIMENSION: 3.1 CM
BH CV ECHO MEAS - LAT PEAK E' VEL: 8 CM/SEC
BH CV ECHO MEAS - LV DIASTOLIC VOL/BSA (35-75): 42.3 CM2
BH CV ECHO MEAS - LV MASS(C)D: 158.8 GRAMS
BH CV ECHO MEAS - LV MAX PG: 3.7 MMHG
BH CV ECHO MEAS - LV MEAN PG: 2 MMHG
BH CV ECHO MEAS - LV SYSTOLIC VOL/BSA (12-30): 17.9 CM2
BH CV ECHO MEAS - LV V1 MAX: 96.5 CM/SEC
BH CV ECHO MEAS - LV V1 VTI: 16.8 CM
BH CV ECHO MEAS - LVIDD: 4.6 CM
BH CV ECHO MEAS - LVIDS: 3.3 CM
BH CV ECHO MEAS - LVOT AREA: 2.8 CM2
BH CV ECHO MEAS - LVOT DIAM: 1.9 CM
BH CV ECHO MEAS - LVPWD: 1 CM
BH CV ECHO MEAS - MED PEAK E' VEL: 6.3 CM/SEC
BH CV ECHO MEAS - MR MAX PG: 114.5 MMHG
BH CV ECHO MEAS - MR MAX VEL: 535 CM/SEC
BH CV ECHO MEAS - MR MEAN PG: 87 MMHG
BH CV ECHO MEAS - MR MEAN VEL: 451 CM/SEC
BH CV ECHO MEAS - MR VTI: 150 CM
BH CV ECHO MEAS - MV A MAX VEL: 84.3 CM/SEC
BH CV ECHO MEAS - MV DEC SLOPE: 479 CM/SEC2
BH CV ECHO MEAS - MV DEC TIME: 0.27 SEC
BH CV ECHO MEAS - MV E MAX VEL: 67.9 CM/SEC
BH CV ECHO MEAS - MV E/A: 0.81
BH CV ECHO MEAS - MV MAX PG: 6.5 MMHG
BH CV ECHO MEAS - MV MEAN PG: 4 MMHG
BH CV ECHO MEAS - MV P1/2T: 56.7 MSEC
BH CV ECHO MEAS - MV V2 VTI: 18.3 CM
BH CV ECHO MEAS - MVA(P1/2T): 3.9 CM2
BH CV ECHO MEAS - MVA(VTI): 2.6 CM2
BH CV ECHO MEAS - PA V2 MAX: 117 CM/SEC
BH CV ECHO MEAS - RAP SYSTOLE: 3 MMHG
BH CV ECHO MEAS - RVDD: 2.6 CM
BH CV ECHO MEAS - RVSP: 25.5 MMHG
BH CV ECHO MEAS - SV(LVOT): 47.6 ML
BH CV ECHO MEAS - SV(MOD-SP2): 38.9 ML
BH CV ECHO MEAS - SV(MOD-SP4): 50 ML
BH CV ECHO MEAS - SVI(LVOT): 23.3 ML/M2
BH CV ECHO MEAS - SVI(MOD-SP2): 19 ML/M2
BH CV ECHO MEAS - SVI(MOD-SP4): 24.4 ML/M2
BH CV ECHO MEAS - TAPSE (>1.6): 1.97 CM
BH CV ECHO MEAS - TR MAX PG: 22.5 MMHG
BH CV ECHO MEAS - TR MAX VEL: 237 CM/SEC
BH CV ECHO MEASUREMENTS AVERAGE E/E' RATIO: 9.5
BH CV XLRA - RV BASE: 3.1 CM
BH CV XLRA - RV MID: 2.3 CM
BH CV XLRA - TDI S': 14.2 CM/SEC
IVRT: 74 MS
LEFT ATRIUM VOLUME INDEX: 27.1 ML/M2
LV EF BIPLANE MOD: 57.2 %

## 2025-02-10 PROCEDURE — 93306 TTE W/DOPPLER COMPLETE: CPT

## 2025-02-10 PROCEDURE — 93306 TTE W/DOPPLER COMPLETE: CPT | Performed by: INTERNAL MEDICINE

## 2025-02-24 ENCOUNTER — TELEPHONE (OUTPATIENT)
Dept: INTERNAL MEDICINE | Age: 85
End: 2025-02-24
Payer: MEDICARE

## 2025-02-24 DIAGNOSIS — Z79.4 TYPE 2 DIABETES MELLITUS WITHOUT COMPLICATION, WITH LONG-TERM CURRENT USE OF INSULIN: Primary | ICD-10-CM

## 2025-02-24 DIAGNOSIS — E11.9 TYPE 2 DIABETES MELLITUS WITHOUT COMPLICATION, WITH LONG-TERM CURRENT USE OF INSULIN: Primary | ICD-10-CM

## 2025-02-24 RX ORDER — BLOOD-GLUCOSE METER
1 EACH MISCELLANEOUS 2 TIMES DAILY
Qty: 1 KIT | Refills: 0 | Status: SHIPPED | OUTPATIENT
Start: 2025-02-24

## 2025-02-24 RX ORDER — AVOBENZONE, HOMOSALATE, OCTISALATE, OCTOCRYLENE 30; 40; 45; 26 MG/ML; MG/ML; MG/ML; MG/ML
1 CREAM TOPICAL 2 TIMES DAILY
Qty: 200 EACH | Refills: 6 | Status: SHIPPED | OUTPATIENT
Start: 2025-02-24 | End: 2025-02-27 | Stop reason: SDUPTHER

## 2025-02-24 NOTE — TELEPHONE ENCOUNTER
Caller: Luzma Dick    Relationship: Self    Best call back number: [phone]    What medication are you requesting:   NEW GLUCOMETER    If a prescription is needed, what is your preferred pharmacy and phone number: [facility] [place] [facility] [address] [facility] [phone] [facility] [phone]      Additional notes:  PATIENT HAS HAD THE SAME GLUCOMETER FOR 20 YEARS AND WOULD LIKE A NEW ONE SENT TO PHARMACY. SHE WOULD LIKE TO  ON 02/27/2025.

## 2025-02-24 NOTE — TELEPHONE ENCOUNTER
Caller: Luzma Dick    Relationship: Self    Best call back number: [phone]    Requested Prescriptions:   Requested Prescriptions     Pending Prescriptions Disp Refills    glucose blood test strip 100 each 3     Si each by Other route As Needed (as needed). Use as instructed        Pharmacy where request should be sent: [place] [phone] [facility] [phone]      Last office visit with prescribing clinician: 2024   Last telemedicine visit with prescribing clinician: Visit date not found   Next office visit with prescribing clinician: 3/6/2025     Additional details provided by patient: PATIENT WOULD LIKE TO  ON THURSDAY, 2025, AS SHE HAS TO  OTHER MEDICATIONS AS WELL.     Does the patient have less than a 3 day supply:  [x] Yes  [] No    Would you like a call back once the refill request has been completed: [] Yes [] No    If the office needs to give you a call back, can they leave a voicemail: [] Yes [] No    Shelbi Glover Rep   25 12:46 EST

## 2025-02-26 NOTE — TELEPHONE ENCOUNTER
Caller: EDWAR Dick    Relationship: Self    Best call back number: [phone]    Which medication are you concerned about:   Blood Glucose Monitoring Suppl     What are your concerns: [facility] DO[facility] NOT CARR[facility] THIS BRAND AND THE ONLY ONE THEY CARRY IS FREESTYLE LITE.    PLEASE ADVISE

## 2025-02-27 ENCOUNTER — TELEPHONE (OUTPATIENT)
Dept: INTERNAL MEDICINE | Age: 85
End: 2025-02-27
Payer: MEDICARE

## 2025-02-27 DIAGNOSIS — E11.9 TYPE 2 DIABETES MELLITUS WITHOUT COMPLICATION, WITH LONG-TERM CURRENT USE OF INSULIN: ICD-10-CM

## 2025-02-27 DIAGNOSIS — Z79.4 TYPE 2 DIABETES MELLITUS WITHOUT COMPLICATION, WITH LONG-TERM CURRENT USE OF INSULIN: ICD-10-CM

## 2025-02-27 RX ORDER — AVOBENZONE, HOMOSALATE, OCTISALATE, OCTOCRYLENE 30; 40; 45; 26 MG/ML; MG/ML; MG/ML; MG/ML
1 CREAM TOPICAL 2 TIMES DAILY
Qty: 200 EACH | Refills: 6 | Status: SHIPPED | OUTPATIENT
Start: 2025-02-27

## 2025-02-27 RX ORDER — BLOOD-GLUCOSE METER
1 KIT MISCELLANEOUS DAILY
Qty: 1 EACH | Refills: 0 | Status: SHIPPED | OUTPATIENT
Start: 2025-02-27 | End: 2025-02-27 | Stop reason: SDUPTHER

## 2025-02-27 RX ORDER — BLOOD-GLUCOSE METER
1 KIT MISCELLANEOUS DAILY
Qty: 1 EACH | Refills: 0 | Status: SHIPPED | OUTPATIENT
Start: 2025-02-27

## 2025-03-05 ENCOUNTER — LAB (OUTPATIENT)
Dept: INTERNAL MEDICINE | Age: 85
End: 2025-03-05
Payer: MEDICARE

## 2025-03-05 DIAGNOSIS — F41.9 ANXIETY: ICD-10-CM

## 2025-03-05 DIAGNOSIS — E06.3 HYPOTHYROIDISM DUE TO HASHIMOTO'S THYROIDITIS: ICD-10-CM

## 2025-03-05 DIAGNOSIS — E66.01 MORBID (SEVERE) OBESITY DUE TO EXCESS CALORIES: ICD-10-CM

## 2025-03-05 DIAGNOSIS — D63.1 ANEMIA OF CHRONIC RENAL FAILURE, STAGE 4 (SEVERE): ICD-10-CM

## 2025-03-05 DIAGNOSIS — N18.4 TYPE 2 DIABETES MELLITUS WITH STAGE 4 CHRONIC KIDNEY DISEASE, WITHOUT LONG-TERM CURRENT USE OF INSULIN: ICD-10-CM

## 2025-03-05 DIAGNOSIS — C50.919 MALIGNANT NEOPLASM OF FEMALE BREAST, UNSPECIFIED ESTROGEN RECEPTOR STATUS, UNSPECIFIED LATERALITY, UNSPECIFIED SITE OF BREAST: ICD-10-CM

## 2025-03-05 DIAGNOSIS — N18.30 STAGE 3 CHRONIC KIDNEY DISEASE, UNSPECIFIED WHETHER STAGE 3A OR 3B CKD: ICD-10-CM

## 2025-03-05 DIAGNOSIS — E11.22 TYPE 2 DIABETES MELLITUS WITH STAGE 4 CHRONIC KIDNEY DISEASE, WITHOUT LONG-TERM CURRENT USE OF INSULIN: ICD-10-CM

## 2025-03-05 DIAGNOSIS — N18.4 ANEMIA OF CHRONIC RENAL FAILURE, STAGE 4 (SEVERE): ICD-10-CM

## 2025-03-05 DIAGNOSIS — I10 HYPERTENSION, ESSENTIAL: ICD-10-CM

## 2025-03-05 DIAGNOSIS — C50.411 MALIGNANT NEOPLASM OF UPPER-OUTER QUADRANT OF RIGHT FEMALE BREAST, UNSPECIFIED ESTROGEN RECEPTOR STATUS: ICD-10-CM

## 2025-03-05 DIAGNOSIS — I48.11 LONGSTANDING PERSISTENT ATRIAL FIBRILLATION: ICD-10-CM

## 2025-03-05 DIAGNOSIS — F51.01 PRIMARY INSOMNIA: ICD-10-CM

## 2025-03-05 DIAGNOSIS — D50.9 IRON DEFICIENCY ANEMIA, UNSPECIFIED IRON DEFICIENCY ANEMIA TYPE: ICD-10-CM

## 2025-03-05 DIAGNOSIS — E55.9 VITAMIN D DEFICIENCY: ICD-10-CM

## 2025-03-05 LAB
25(OH)D3 SERPL-MCNC: 25 NG/ML (ref 30–100)
ALBUMIN SERPL-MCNC: 3.5 G/DL (ref 3.5–5.2)
ALBUMIN/GLOB SERPL: 1.1 G/DL
ALP SERPL-CCNC: 125 U/L (ref 39–117)
ALT SERPL W P-5'-P-CCNC: 6 U/L (ref 1–33)
ANION GAP SERPL CALCULATED.3IONS-SCNC: 12.3 MMOL/L (ref 5–15)
AST SERPL-CCNC: 12 U/L (ref 1–32)
BASOPHILS # BLD AUTO: 0.03 10*3/MM3 (ref 0–0.2)
BASOPHILS NFR BLD AUTO: 0.4 % (ref 0–1.5)
BILIRUB SERPL-MCNC: <0.2 MG/DL (ref 0–1.2)
BUN SERPL-MCNC: 57 MG/DL (ref 8–23)
BUN/CREAT SERPL: 19 (ref 7–25)
CALCIUM SPEC-SCNC: 9.8 MG/DL (ref 8.6–10.5)
CHLORIDE SERPL-SCNC: 112 MMOL/L (ref 98–107)
CHOLEST SERPL-MCNC: 185 MG/DL (ref 0–200)
CO2 SERPL-SCNC: 15.7 MMOL/L (ref 22–29)
CREAT SERPL-MCNC: 3 MG/DL (ref 0.57–1)
DEPRECATED RDW RBC AUTO: 46.8 FL (ref 37–54)
EGFRCR SERPLBLD CKD-EPI 2021: 14.9 ML/MIN/1.73
EOSINOPHIL # BLD AUTO: 0.21 10*3/MM3 (ref 0–0.4)
EOSINOPHIL NFR BLD AUTO: 2.5 % (ref 0.3–6.2)
ERYTHROCYTE [DISTWIDTH] IN BLOOD BY AUTOMATED COUNT: 15.3 % (ref 12.3–15.4)
FERRITIN SERPL-MCNC: 78.3 NG/ML (ref 13–150)
GLOBULIN UR ELPH-MCNC: 3.1 GM/DL
GLUCOSE SERPL-MCNC: 149 MG/DL (ref 65–99)
HBA1C MFR BLD: 5.9 % (ref 4.8–5.6)
HCT VFR BLD AUTO: 28.8 % (ref 34–46.6)
HDLC SERPL-MCNC: 34 MG/DL (ref 40–60)
HGB BLD-MCNC: 9.4 G/DL (ref 12–15.9)
IMM GRANULOCYTES # BLD AUTO: 0.06 10*3/MM3 (ref 0–0.05)
IMM GRANULOCYTES NFR BLD AUTO: 0.7 % (ref 0–0.5)
IRON 24H UR-MRATE: 37 MCG/DL (ref 37–145)
IRON SATN MFR SERPL: 11 % (ref 20–50)
LDLC SERPL CALC-MCNC: 112 MG/DL (ref 0–100)
LDLC/HDLC SERPL: 3.14 {RATIO}
LYMPHOCYTES # BLD AUTO: 2.94 10*3/MM3 (ref 0.7–3.1)
LYMPHOCYTES NFR BLD AUTO: 35.2 % (ref 19.6–45.3)
MCH RBC QN AUTO: 27.9 PG (ref 26.6–33)
MCHC RBC AUTO-ENTMCNC: 32.6 G/DL (ref 31.5–35.7)
MCV RBC AUTO: 85.5 FL (ref 79–97)
MONOCYTES # BLD AUTO: 0.51 10*3/MM3 (ref 0.1–0.9)
MONOCYTES NFR BLD AUTO: 6.1 % (ref 5–12)
NEUTROPHILS NFR BLD AUTO: 4.6 10*3/MM3 (ref 1.7–7)
NEUTROPHILS NFR BLD AUTO: 55.1 % (ref 42.7–76)
NRBC BLD AUTO-RTO: 0 /100 WBC (ref 0–0.2)
PLATELET # BLD AUTO: 262 10*3/MM3 (ref 140–450)
PMV BLD AUTO: 9.8 FL (ref 6–12)
POTASSIUM SERPL-SCNC: 4.1 MMOL/L (ref 3.5–5.2)
PROT SERPL-MCNC: 6.6 G/DL (ref 6–8.5)
RBC # BLD AUTO: 3.37 10*6/MM3 (ref 3.77–5.28)
SODIUM SERPL-SCNC: 140 MMOL/L (ref 136–145)
T4 FREE SERPL-MCNC: 1.18 NG/DL (ref 0.92–1.68)
TIBC SERPL-MCNC: 335 MCG/DL (ref 298–536)
TRANSFERRIN SERPL-MCNC: 225 MG/DL (ref 200–360)
TRIGL SERPL-MCNC: 221 MG/DL (ref 0–150)
TSH SERPL DL<=0.05 MIU/L-ACNC: 3.02 UIU/ML (ref 0.27–4.2)
VLDLC SERPL-MCNC: 39 MG/DL (ref 5–40)
WBC NRBC COR # BLD AUTO: 8.35 10*3/MM3 (ref 3.4–10.8)

## 2025-03-05 PROCEDURE — 83036 HEMOGLOBIN GLYCOSYLATED A1C: CPT | Performed by: INTERNAL MEDICINE

## 2025-03-05 PROCEDURE — 85025 COMPLETE CBC W/AUTO DIFF WBC: CPT | Performed by: INTERNAL MEDICINE

## 2025-03-05 PROCEDURE — 80061 LIPID PANEL: CPT | Performed by: INTERNAL MEDICINE

## 2025-03-05 PROCEDURE — 84466 ASSAY OF TRANSFERRIN: CPT | Performed by: INTERNAL MEDICINE

## 2025-03-05 PROCEDURE — 83540 ASSAY OF IRON: CPT | Performed by: INTERNAL MEDICINE

## 2025-03-05 PROCEDURE — 82728 ASSAY OF FERRITIN: CPT | Performed by: INTERNAL MEDICINE

## 2025-03-05 PROCEDURE — 84439 ASSAY OF FREE THYROXINE: CPT | Performed by: INTERNAL MEDICINE

## 2025-03-05 PROCEDURE — 36415 COLL VENOUS BLD VENIPUNCTURE: CPT | Performed by: INTERNAL MEDICINE

## 2025-03-05 PROCEDURE — 82306 VITAMIN D 25 HYDROXY: CPT | Performed by: INTERNAL MEDICINE

## 2025-03-05 PROCEDURE — 84443 ASSAY THYROID STIM HORMONE: CPT | Performed by: INTERNAL MEDICINE

## 2025-03-05 PROCEDURE — 80053 COMPREHEN METABOLIC PANEL: CPT | Performed by: INTERNAL MEDICINE

## 2025-03-06 ENCOUNTER — OFFICE VISIT (OUTPATIENT)
Dept: INTERNAL MEDICINE | Age: 85
End: 2025-03-06
Payer: MEDICARE

## 2025-03-06 VITALS
TEMPERATURE: 98.2 F | SYSTOLIC BLOOD PRESSURE: 114 MMHG | DIASTOLIC BLOOD PRESSURE: 66 MMHG | HEIGHT: 66 IN | BODY MASS INDEX: 33.59 KG/M2 | OXYGEN SATURATION: 93 % | HEART RATE: 92 BPM | WEIGHT: 209 LBS

## 2025-03-06 DIAGNOSIS — Z79.4 TYPE 2 DIABETES MELLITUS WITHOUT COMPLICATION, WITH LONG-TERM CURRENT USE OF INSULIN: ICD-10-CM

## 2025-03-06 DIAGNOSIS — E11.9 TYPE 2 DIABETES MELLITUS WITHOUT COMPLICATION, WITH LONG-TERM CURRENT USE OF INSULIN: ICD-10-CM

## 2025-03-06 DIAGNOSIS — E06.3 HYPOTHYROIDISM DUE TO HASHIMOTO'S THYROIDITIS: ICD-10-CM

## 2025-03-06 DIAGNOSIS — R74.8 ELEVATED LIVER ENZYMES: ICD-10-CM

## 2025-03-06 DIAGNOSIS — I48.11 LONGSTANDING PERSISTENT ATRIAL FIBRILLATION: ICD-10-CM

## 2025-03-06 DIAGNOSIS — D63.1 ANEMIA OF CHRONIC RENAL FAILURE, STAGE 4 (SEVERE): ICD-10-CM

## 2025-03-06 DIAGNOSIS — I10 HYPERTENSION, ESSENTIAL: ICD-10-CM

## 2025-03-06 DIAGNOSIS — F51.01 PRIMARY INSOMNIA: ICD-10-CM

## 2025-03-06 DIAGNOSIS — F41.9 ANXIETY: ICD-10-CM

## 2025-03-06 DIAGNOSIS — E55.9 VITAMIN D DEFICIENCY: ICD-10-CM

## 2025-03-06 DIAGNOSIS — N18.4 ANEMIA OF CHRONIC RENAL FAILURE, STAGE 4 (SEVERE): ICD-10-CM

## 2025-03-06 DIAGNOSIS — D50.9 IRON DEFICIENCY ANEMIA, UNSPECIFIED IRON DEFICIENCY ANEMIA TYPE: ICD-10-CM

## 2025-03-06 DIAGNOSIS — E66.01 MORBID (SEVERE) OBESITY DUE TO EXCESS CALORIES: ICD-10-CM

## 2025-03-06 DIAGNOSIS — N18.4 CKD (CHRONIC KIDNEY DISEASE) STAGE 4, GFR 15-29 ML/MIN: Primary | ICD-10-CM

## 2025-03-06 DIAGNOSIS — C50.411 MALIGNANT NEOPLASM OF UPPER-OUTER QUADRANT OF RIGHT FEMALE BREAST, UNSPECIFIED ESTROGEN RECEPTOR STATUS: ICD-10-CM

## 2025-03-06 RX ORDER — CETIRIZINE HYDROCHLORIDE 10 MG/1
10 TABLET ORAL ONCE
Status: CANCELLED | OUTPATIENT
Start: 2025-03-07

## 2025-03-06 RX ORDER — SODIUM CHLORIDE 9 MG/ML
20 INJECTION, SOLUTION INTRAVENOUS ONCE
OUTPATIENT
Start: 2025-03-07

## 2025-03-06 RX ORDER — ACETAMINOPHEN 325 MG/1
650 TABLET ORAL ONCE
OUTPATIENT
Start: 2025-03-07

## 2025-03-06 RX ORDER — SODIUM BICARBONATE 650 MG/1
650 TABLET ORAL 3 TIMES DAILY
Qty: 90 TABLET | Refills: 5 | Status: SHIPPED | OUTPATIENT
Start: 2025-03-06

## 2025-03-06 RX ORDER — HYDROCORTISONE SODIUM SUCCINATE 100 MG/2ML
50 INJECTION INTRAMUSCULAR; INTRAVENOUS AS NEEDED
OUTPATIENT
Start: 2025-03-07

## 2025-03-06 RX ORDER — HYDROCORTISONE SODIUM SUCCINATE 100 MG/2ML
100 INJECTION INTRAMUSCULAR; INTRAVENOUS AS NEEDED
OUTPATIENT
Start: 2025-03-07

## 2025-03-06 RX ORDER — DIPHENHYDRAMINE HYDROCHLORIDE 50 MG/ML
50 INJECTION INTRAMUSCULAR; INTRAVENOUS AS NEEDED
OUTPATIENT
Start: 2025-03-07

## 2025-03-06 RX ORDER — FAMOTIDINE 20 MG/1
20 TABLET, FILM COATED ORAL ONCE
Status: CANCELLED | OUTPATIENT
Start: 2025-03-07

## 2025-03-06 RX ORDER — DIPHENHYDRAMINE HCL 25 MG
25 CAPSULE ORAL ONCE
Status: CANCELLED | OUTPATIENT
Start: 2025-03-07

## 2025-03-06 RX ORDER — FAMOTIDINE 10 MG/ML
20 INJECTION, SOLUTION INTRAVENOUS AS NEEDED
OUTPATIENT
Start: 2025-03-07

## 2025-03-06 RX ORDER — FAMOTIDINE 10 MG/ML
20 INJECTION, SOLUTION INTRAVENOUS ONCE
OUTPATIENT
Start: 2025-03-07

## 2025-03-06 RX ORDER — METHYLPREDNISOLONE 4 MG/1
20 TABLET ORAL ONCE
OUTPATIENT
Start: 2025-03-07

## 2025-03-06 NOTE — PROGRESS NOTES
"Chief Complaint   Patient presents with    Diabetes     Routine follow up, Lab follow up, No concerns.         Objective   Vital Signs  Vitals:    03/06/25 1211   BP: 114/66   BP Location: Right arm   Patient Position: Sitting   Pulse: 92   Temp: 98.2 °F (36.8 °C)   SpO2: 93%   Weight: 94.8 kg (209 lb)   Height: 166.4 cm (65.51\")      Body mass index is 34.24 kg/m².  Review of Systems   Constitutional: Negative.    HENT: Negative.     Eyes: Negative.    Respiratory: Negative.     Cardiovascular: Negative.    Gastrointestinal: Negative.    Endocrine: Negative.    Genitourinary: Negative.    Musculoskeletal: Negative.    Allergic/Immunologic: Negative.    Neurological: Negative.    Hematological: Negative.    Psychiatric/Behavioral: Negative.        Physical Exam  Constitutional:       General: She is not in acute distress.     Appearance: Normal appearance. She is obese.   HENT:      Head: Normocephalic.      Mouth/Throat:      Mouth: Mucous membranes are moist.   Eyes:      Conjunctiva/sclera: Conjunctivae normal.      Pupils: Pupils are equal, round, and reactive to light.   Cardiovascular:      Rate and Rhythm: Normal rate and regular rhythm.      Pulses: Normal pulses.      Heart sounds: Murmur heard.   Pulmonary:      Effort: Pulmonary effort is normal.      Breath sounds: Normal breath sounds.   Abdominal:      General: Bowel sounds are normal.      Palpations: Abdomen is soft.   Musculoskeletal:         General: No swelling. Normal range of motion.      Cervical back: Neck supple.   Skin:     General: Skin is warm and dry.      Coloration: Skin is not jaundiced.   Neurological:      General: No focal deficit present.      Mental Status: She is alert and oriented to person, place, and time. Mental status is at baseline.   Psychiatric:         Mood and Affect: Mood normal.         Behavior: Behavior normal.         Thought Content: Thought content normal.         Judgment: Judgment normal.        Result Review : "   Lab Results   Component Value Date    PROBNP 823.1 09/13/2021     01/09/2020     CMP          11/4/2024    10:22 1/2/2025    10:38 3/5/2025    09:41   CMP   Glucose 116  130  149    BUN 37  34  57    Creatinine 2.76  2.21  3.00    EGFR 16.5  21.5  14.9    Sodium 138  138  140    Potassium 4.4  4.7  4.1    Chloride 108  111  112    Calcium 9.9  9.8  9.8    Total Protein 7.0   6.6    Albumin 3.6  3.7  3.5    Globulin 3.4   3.1    Total Bilirubin 0.2   <0.2    Alkaline Phosphatase 110   125    AST (SGOT) 13   12    ALT (SGPT) 6   6    Albumin/Globulin Ratio 1.1   1.1    BUN/Creatinine Ratio 13.4  15.4  19.0    Anion Gap 10.8  7.6  12.3      CBC w/diff          11/4/2024    10:22 1/2/2025    10:38 3/5/2025    09:41   CBC w/Diff   WBC 7.76  8.03  8.35    RBC 3.42  3.48  3.37    Hemoglobin 9.5  9.9  9.4    Hematocrit 30.0  29.6  28.8    MCV 87.7  85.1  85.5    MCH 27.8  28.4  27.9    MCHC 31.7  33.4  32.6    RDW 14.6  15.1  15.3    Platelets 239  234  262    Neutrophil Rel % 55.2  60.9  55.1    Immature Granulocyte Rel % 0.5  0.4  0.7    Lymphocyte Rel % 35.1  28.0  35.2    Monocyte Rel % 6.1  7.5  6.1    Eosinophil Rel % 2.8  3.0  2.5    Basophil Rel % 0.3  0.2  0.4       Lipid Panel          5/30/2024    10:30 11/4/2024    10:22 3/5/2025    09:41   Lipid Panel   Total Cholesterol 207  200  185    Triglycerides 283  229  221    HDL Cholesterol 42  38  34    VLDL Cholesterol 49  40  39    LDL Cholesterol  116  122  112    LDL/HDL Ratio 2.58  3.06  3.14       Lab Results   Component Value Date    TSH 3.020 03/05/2025    TSH 2.540 11/04/2024    TSH 2.550 01/29/2024      Lab Results   Component Value Date    FREET4 1.18 03/05/2025    FREET4 1.24 11/04/2024    FREET4 1.12 01/29/2024      A1C Last 3 Results          5/30/2024    10:30 11/4/2024    10:22 3/5/2025    09:41   HGBA1C Last 3 Results   Hemoglobin A1C 6.50  5.40  5.90                        Visit Diagnoses:    ICD-10-CM ICD-9-CM   1. CKD (chronic kidney  disease) stage 4, GFR 15-29 ml/min  N18.4 585.4   2. Primary insomnia  F51.01 307.42   3. Anxiety  F41.9 300.00   4. Iron deficiency anemia, unspecified iron deficiency anemia type  D50.9 280.9   5. Vitamin D deficiency  E55.9 268.9   6. Longstanding persistent atrial fibrillation  I48.11 427.31   7. Type 2 diabetes mellitus without complication, with long-term current use of insulin  E11.9 250.00    Z79.4 V58.67   8. Malignant neoplasm of upper-outer quadrant of right female breast, unspecified estrogen receptor status  C50.411 174.4   9. Anemia of chronic renal failure, stage 4 (severe)  N18.4 285.21    D63.1 585.4   10. Elevated liver enzymes  R74.8 790.5   11. Hypothyroidism due to Hashimoto's thyroiditis  E06.3 245.2   12. Morbid (severe) obesity due to excess calories  E66.01 278.01   13. Hypertension, essential  I10 401.9       Assessment and Plan   Diagnoses and all orders for this visit:    1. CKD (chronic kidney disease) stage 4, GFR 15-29 ml/min (Primary)  -     Ambulatory Referral to [facility] For Infusion Treatment  -     Comprehensive Metabolic Panel; Future  -     CBC & Differential; Future  -     Ferritin; Future  -     Iron Profile; Future  -     Hemoglobin A1c; Future  -     Lipid Panel; Future  -     TSH+Free T4; Future  -     Basic Metabolic Panel; Future    2. Primary insomnia  -     Ambulatory Referral to [facility] For Infusion Treatment  -     Comprehensive Metabolic Panel; Future  -     CBC & Differential; Future  -     Ferritin; Future  -     Iron Profile; Future  -     Hemoglobin A1c; Future  -     Lipid Panel; Future  -     TSH+Free T4; Future  -     Basic Metabolic Panel; Future    3. Anxiety  -     Ambulatory Referral to [facility] For Infusion Treatment  -     Comprehensive Metabolic Panel; Future  -     CBC & Differential; Future  -     Ferritin; Future  -     Iron Profile; Future  -     Hemoglobin A1c; Future  -     Lipid Panel; Future  -     TSH+Free T4; Future  -     Basic Metabolic Panel;  Future    4. Iron deficiency anemia, unspecified iron deficiency anemia type  -     Ambulatory Referral to [facility] For Infusion Treatment  -     Comprehensive Metabolic Panel; Future  -     CBC & Differential; Future  -     Ferritin; Future  -     Iron Profile; Future  -     Hemoglobin A1c; Future  -     Lipid Panel; Future  -     TSH+Free T4; Future  -     Basic Metabolic Panel; Future    5. Vitamin D deficiency  -     Ambulatory Referral to [facility] For Infusion Treatment  -     Comprehensive Metabolic Panel; Future  -     CBC & Differential; Future  -     Ferritin; Future  -     Iron Profile; Future  -     Hemoglobin A1c; Future  -     Lipid Panel; Future  -     TSH+Free T4; Future  -     Basic Metabolic Panel; Future    6. Longstanding persistent atrial fibrillation  -     Ambulatory Referral to [facility] For Infusion Treatment  -     Comprehensive Metabolic Panel; Future  -     CBC & Differential; Future  -     Ferritin; Future  -     Iron Profile; Future  -     Hemoglobin A1c; Future  -     Lipid Panel; Future  -     TSH+Free T4; Future  -     Basic Metabolic Panel; Future    7. Type 2 diabetes mellitus without complication, with long-term current use of insulin  -     Ambulatory Referral to [facility] For Infusion Treatment  -     Comprehensive Metabolic Panel; Future  -     CBC & Differential; Future  -     Ferritin; Future  -     Iron Profile; Future  -     Hemoglobin A1c; Future  -     Lipid Panel; Future  -     TSH+Free T4; Future  -     Basic Metabolic Panel; Future    8. Malignant neoplasm of upper-outer quadrant of right female breast, unspecified estrogen receptor status  -     Ambulatory Referral to [facility] For Infusion Treatment  -     Comprehensive Metabolic Panel; Future  -     CBC & Differential; Future  -     Ferritin; Future  -     Iron Profile; Future  -     Hemoglobin A1c; Future  -     Lipid Panel; Future  -     TSH+Free T4; Future  -     Basic Metabolic Panel; Future    9. Anemia of chronic renal failure, stage 4  (severe)  -     Ambulatory Referral to [facility] For Infusion Treatment  -     Comprehensive Metabolic Panel; Future  -     CBC & Differential; Future  -     Ferritin; Future  -     Iron Profile; Future  -     Hemoglobin A1c; Future  -     Lipid Panel; Future  -     TSH+Free T4; Future  -     Basic Metabolic Panel; Future    10. Elevated liver enzymes  -     Ambulatory Referral to [facility] For Infusion Treatment  -     Comprehensive Metabolic Panel; Future  -     CBC & Differential; Future  -     Ferritin; Future  -     Iron Profile; Future  -     Hemoglobin A1c; Future  -     Lipid Panel; Future  -     TSH+Free T4; Future  -     Basic Metabolic Panel; Future    11. Hypothyroidism due to Hashimoto's thyroiditis  -     Ambulatory Referral to [facility] For Infusion Treatment  -     Comprehensive Metabolic Panel; Future  -     CBC & Differential; Future  -     Ferritin; Future  -     Iron Profile; Future  -     Hemoglobin A1c; Future  -     Lipid Panel; Future  -     TSH+Free T4; Future  -     Basic Metabolic Panel; Future    12. Morbid (severe) obesity due to excess calories  -     Ambulatory Referral to [facility] For Infusion Treatment  -     Comprehensive Metabolic Panel; Future  -     CBC & Differential; Future  -     Ferritin; Future  -     Iron Profile; Future  -     Hemoglobin A1c; Future  -     Lipid Panel; Future  -     TSH+Free T4; Future  -     Basic Metabolic Panel; Future    13. Hypertension, essential  -     Ambulatory Referral to [facility] For Infusion Treatment  -     Comprehensive Metabolic Panel; Future  -     CBC & Differential; Future  -     Ferritin; Future  -     Iron Profile; Future  -     Hemoglobin A1c; Future  -     Lipid Panel; Future  -     TSH+Free T4; Future  -     Basic Metabolic Panel; Future    Other orders  -     sodium bicarbonate 650 MG tablet; Take 1 tablet by mouth 3 (Three) Times a Day.  Dispense: 90 tablet; Refill: 5  -     sodium chloride 0.9 % infusion  -     acetaminophen (TYLENOL) tablet 650 mg  -      cetirizine (zyrTEC) tablet 10 mg  -     diphenhydrAMINE (BENADRYL) capsule 25 mg  -     methylPREDNISolone (MEDROL) tablet 20 mg  -     famotidine (PEPCID) injection 20 mg  -     famotidine (PEPCID) tablet 20 mg  -     ferumoxytol (FERAHEME) 510 mg in sodium chloride 0.9 % 117 mL IVPB  -     Hydrocortisone Sod Suc (PF) (Solu-CORTEF) injection 50 mg  -     Hydrocortisone Sod Suc (PF) (Solu-CORTEF) injection 100 mg  -     diphenhydrAMINE (BENADRYL) injection 50 mg  -     famotidine (PEPCID) injection 20 mg        CKD 4 - CREAT --- up to 3.0 BUN 57 more metabolic acidosis developing will need to start sodium bicarb,--- March 6, 2025 patient needs to follow-up with Dr. lala group may need to change losartan HCT to just losartan,    Annual wellness visit performed Agnes 3, 2024---    Perforated eardrum on the right side back in November 2024, after flu shot??    Iron deficiency anemia,, improved stable at 9.9 hematocrit 30.3 May 30, 2024=== got 2 iron infusions by nephrology end of January 2024------------- colonoscopy Dr. Lopez 2019 multiple polyps, patient received iron infusions July 2022 -----previously had iron infusions in 2019, consider therapy plan again March 6, 2025 for IV Feraheme, therapy plan placed March 6, 2025    Heart murmur,----- echocardiogram February 10, 2025 shows shows normal ejection fraction grade 1 diastolic dysfunction mild mitral valve insufficiency , has had previous echoes over the past 5 years, otherwise stable    Subclinical hypothyroid---cont  Synthroid 0.05 mg daily     Diarrhea intermittent, uses colestipol as needed, still has her gallbladder, improved -----off metformin June 2024 now back on, patient is can to clarify medications as of November 6, 2024    s/p L TKA, 4/16,     chronic A. 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 guided removal  December 3, 2014 with 4 sentinel lymph nodes removed all negative,------ off anastrozole per oncology/ [facility] and stable since  Dec 2020    HTN-, continues HYDRALAZINE 50 TID AND LOSARTAN 100/25 QD , DOXAZOSIN 4 MG QHS, COREG 12.5 BID, Cardizem  mg daily because of worsening renal insufficiency, acidosis patient is going to decrease her losartan to half a tablet daily March 6, 2025    B/L SHOULDER AND L KNEE OA--     GOUT- BETTER --continue ALLOPURINOL 300 mg every other day    OBESE-MORBID    DM 2, HGA1C- --5.9 March 2025--  cont Januvia 50 mg daily,, Ozempic 1 mg subcu weekly,  Metformin 500 mg extended release twice a day    EYES CHECK BLOOM / BENNET -August 2024,    ELEVATED LFTS, currently normal     VIT D DEF continues replacement     ELEVATED CHOL--patient intolerant of statins discussed,    INSOMNIA, continues Ambien nightly           Does yearly mammograms          Follow Up   Return in about 6 months (around 9/6/2025).  Patient was given instructions and counseling regarding her condition or for health maintenance advice. Please see specific information pulled into the AVS if appropriate.

## 2025-03-13 ENCOUNTER — TELEPHONE (OUTPATIENT)
Dept: INTERNAL MEDICINE | Age: 85
End: 2025-03-13
Payer: MEDICARE

## 2025-03-13 ENCOUNTER — HOSPITAL ENCOUNTER (OUTPATIENT)
Dept: INFUSION THERAPY | Facility: HOSPITAL | Age: 85
Discharge: HOME | End: 2025-03-13
Payer: MEDICARE

## 2025-03-13 VITALS
TEMPERATURE: 97.5 F | SYSTOLIC BLOOD PRESSURE: 157 MMHG | DIASTOLIC BLOOD PRESSURE: 80 MMHG | BODY MASS INDEX: 33.8 KG/M2 | OXYGEN SATURATION: 98 % | HEART RATE: 86 BPM | WEIGHT: 210.32 LBS | RESPIRATION RATE: 20 BRPM | HEIGHT: 66 IN

## 2025-03-13 DIAGNOSIS — N18.4 ANEMIA OF CHRONIC RENAL FAILURE, STAGE 4 (SEVERE): Primary | ICD-10-CM

## 2025-03-13 DIAGNOSIS — D63.1 ANEMIA OF CHRONIC RENAL FAILURE, STAGE 4 (SEVERE): Primary | ICD-10-CM

## 2025-03-13 DIAGNOSIS — D50.9 IRON DEFICIENCY ANEMIA, UNSPECIFIED IRON DEFICIENCY ANEMIA TYPE: ICD-10-CM

## 2025-03-13 PROCEDURE — 96374 THER/PROPH/DIAG INJ IV PUSH: CPT

## 2025-03-13 PROCEDURE — A9270 NON-COVERED ITEM OR SERVICE: HCPCS | Performed by: INTERNAL MEDICINE

## 2025-03-13 PROCEDURE — 25010000002 FERUMOXYTOL 510 MG/17ML SOLUTION: Performed by: INTERNAL MEDICINE

## 2025-03-13 PROCEDURE — 63710000001 CETIRIZINE 10 MG TABLET: Performed by: INTERNAL MEDICINE

## 2025-03-13 PROCEDURE — 63710000001 ACETAMINOPHEN 325 MG TABLET: Performed by: INTERNAL MEDICINE

## 2025-03-13 RX ORDER — HYDROCORTISONE SODIUM SUCCINATE 100 MG/2ML
100 INJECTION INTRAMUSCULAR; INTRAVENOUS AS NEEDED
OUTPATIENT
Start: 2025-03-20

## 2025-03-13 RX ORDER — SODIUM CHLORIDE 9 MG/ML
20 INJECTION, SOLUTION INTRAVENOUS ONCE
OUTPATIENT
Start: 2025-03-20

## 2025-03-13 RX ORDER — CETIRIZINE HYDROCHLORIDE 10 MG/1
5 TABLET ORAL ONCE
OUTPATIENT
Start: 2025-03-20

## 2025-03-13 RX ORDER — METHYLPREDNISOLONE 4 MG/1
20 TABLET ORAL ONCE
OUTPATIENT
Start: 2025-03-20

## 2025-03-13 RX ORDER — ACETAMINOPHEN 325 MG/1
650 TABLET ORAL ONCE
Status: COMPLETED | OUTPATIENT
Start: 2025-03-13 | End: 2025-03-13

## 2025-03-13 RX ORDER — FAMOTIDINE 10 MG/ML
20 INJECTION, SOLUTION INTRAVENOUS AS NEEDED
OUTPATIENT
Start: 2025-03-20

## 2025-03-13 RX ORDER — ACETAMINOPHEN 325 MG/1
650 TABLET ORAL ONCE
OUTPATIENT
Start: 2025-03-20

## 2025-03-13 RX ORDER — FAMOTIDINE 10 MG/ML
20 INJECTION, SOLUTION INTRAVENOUS ONCE
OUTPATIENT
Start: 2025-03-20

## 2025-03-13 RX ORDER — HYDROCORTISONE SODIUM SUCCINATE 100 MG/2ML
50 INJECTION INTRAMUSCULAR; INTRAVENOUS AS NEEDED
OUTPATIENT
Start: 2025-03-20

## 2025-03-13 RX ORDER — SODIUM CHLORIDE 9 MG/ML
20 INJECTION, SOLUTION INTRAVENOUS ONCE
Status: DISCONTINUED | OUTPATIENT
Start: 2025-03-13 | End: 2025-03-15 | Stop reason: HOSPADM

## 2025-03-13 RX ORDER — DIPHENHYDRAMINE HYDROCHLORIDE 50 MG/ML
50 INJECTION INTRAMUSCULAR; INTRAVENOUS AS NEEDED
OUTPATIENT
Start: 2025-03-20

## 2025-03-13 RX ORDER — CETIRIZINE HYDROCHLORIDE 10 MG/1
5 TABLET ORAL ONCE
Status: COMPLETED | OUTPATIENT
Start: 2025-03-13 | End: 2025-03-13

## 2025-03-13 RX ADMIN — ACETAMINOPHEN 650 MG: 325 TABLET ORAL at 10:25

## 2025-03-13 RX ADMIN — FERUMOXYTOL 510 MG: 510 INJECTION INTRAVENOUS at 10:57

## 2025-03-13 RX ADMIN — CETIRIZINE HYDROCHLORIDE 5 MG: 10 TABLET, FILM COATED ORAL at 10:25

## 2025-03-13 NOTE — CODE DOCUMENTATION
Feraheme started at 1057 and at 1103 began to profusely vomit. Feraheme stopped. Vitals remain stable. Dr. Ly contacted; however, patient doesn't want to continue with infusion at this time. Previous iron given in 1/24 was Injectafer and tolerated well. Previous chart review shows patient had Feraheme in 2022 and it wasn't tolerated well. Allergy list updated.

## 2025-03-13 NOTE — TELEPHONE ENCOUNTER
Pt was getting her Feraheme Infusion today and with in 5 mins of getting it she started vomiting. Her BP stayed stable. She said she is not continuing with it. Please advise. Winsome [phone]

## 2025-04-07 ENCOUNTER — CLINICAL SUPPORT (OUTPATIENT)
Dept: INTERNAL MEDICINE | Age: 85
End: 2025-04-07
Payer: MEDICARE

## 2025-04-07 DIAGNOSIS — I10 HYPERTENSION, ESSENTIAL: ICD-10-CM

## 2025-04-07 DIAGNOSIS — N18.4 CKD (CHRONIC KIDNEY DISEASE) STAGE 4, GFR 15-29 ML/MIN: ICD-10-CM

## 2025-04-07 DIAGNOSIS — F51.01 PRIMARY INSOMNIA: ICD-10-CM

## 2025-04-07 DIAGNOSIS — R74.8 ELEVATED LIVER ENZYMES: ICD-10-CM

## 2025-04-07 DIAGNOSIS — N18.4 ANEMIA OF CHRONIC RENAL FAILURE, STAGE 4 (SEVERE): ICD-10-CM

## 2025-04-07 DIAGNOSIS — E11.9 TYPE 2 DIABETES MELLITUS WITHOUT COMPLICATION, WITH LONG-TERM CURRENT USE OF INSULIN: ICD-10-CM

## 2025-04-07 DIAGNOSIS — E06.3 HYPOTHYROIDISM DUE TO HASHIMOTO'S THYROIDITIS: ICD-10-CM

## 2025-04-07 DIAGNOSIS — C50.411 MALIGNANT NEOPLASM OF UPPER-OUTER QUADRANT OF RIGHT FEMALE BREAST, UNSPECIFIED ESTROGEN RECEPTOR STATUS: ICD-10-CM

## 2025-04-07 DIAGNOSIS — F41.9 ANXIETY: ICD-10-CM

## 2025-04-07 DIAGNOSIS — I48.11 LONGSTANDING PERSISTENT ATRIAL FIBRILLATION: ICD-10-CM

## 2025-04-07 DIAGNOSIS — D63.1 ANEMIA OF CHRONIC RENAL FAILURE, STAGE 4 (SEVERE): ICD-10-CM

## 2025-04-07 DIAGNOSIS — Z79.4 TYPE 2 DIABETES MELLITUS WITHOUT COMPLICATION, WITH LONG-TERM CURRENT USE OF INSULIN: ICD-10-CM

## 2025-04-07 DIAGNOSIS — E55.9 VITAMIN D DEFICIENCY: ICD-10-CM

## 2025-04-07 DIAGNOSIS — D50.9 IRON DEFICIENCY ANEMIA, UNSPECIFIED IRON DEFICIENCY ANEMIA TYPE: ICD-10-CM

## 2025-04-07 DIAGNOSIS — E66.01 MORBID (SEVERE) OBESITY DUE TO EXCESS CALORIES: ICD-10-CM

## 2025-04-07 LAB
ANION GAP SERPL CALCULATED.3IONS-SCNC: 14.9 MMOL/L (ref 5–15)
BUN SERPL-MCNC: 28 MG/DL (ref 8–23)
BUN/CREAT SERPL: 10 (ref 7–25)
CALCIUM SPEC-SCNC: 9.9 MG/DL (ref 8.6–10.5)
CHLORIDE SERPL-SCNC: 106 MMOL/L (ref 98–107)
CO2 SERPL-SCNC: 21.1 MMOL/L (ref 22–29)
CREAT SERPL-MCNC: 2.8 MG/DL (ref 0.57–1)
EGFRCR SERPLBLD CKD-EPI 2021: 16.2 ML/MIN/1.73
GLUCOSE SERPL-MCNC: 123 MG/DL (ref 65–99)
POTASSIUM SERPL-SCNC: 4.4 MMOL/L (ref 3.5–5.2)
SODIUM SERPL-SCNC: 142 MMOL/L (ref 136–145)

## 2025-04-07 PROCEDURE — 36415 COLL VENOUS BLD VENIPUNCTURE: CPT | Performed by: INTERNAL MEDICINE

## 2025-04-07 PROCEDURE — 80048 BASIC METABOLIC PNL TOTAL CA: CPT | Performed by: INTERNAL MEDICINE

## 2025-04-14 ENCOUNTER — TELEPHONE (OUTPATIENT)
Dept: INTERNAL MEDICINE | Age: 85
End: 2025-04-14

## 2025-04-14 DIAGNOSIS — D50.9 IRON DEFICIENCY ANEMIA, UNSPECIFIED IRON DEFICIENCY ANEMIA TYPE: Primary | ICD-10-CM

## 2025-04-14 RX ORDER — DIPHENHYDRAMINE HYDROCHLORIDE 50 MG/ML
50 INJECTION, SOLUTION INTRAMUSCULAR; INTRAVENOUS AS NEEDED
OUTPATIENT
Start: 2025-04-15

## 2025-04-14 RX ORDER — HYDROCORTISONE SODIUM SUCCINATE 100 MG/2ML
100 INJECTION INTRAMUSCULAR; INTRAVENOUS AS NEEDED
OUTPATIENT
Start: 2025-04-15

## 2025-04-14 RX ORDER — DIPHENHYDRAMINE HCL 25 MG
25 CAPSULE ORAL ONCE
OUTPATIENT
Start: 2025-04-15

## 2025-04-14 RX ORDER — ACETAMINOPHEN 325 MG/1
650 TABLET ORAL ONCE
OUTPATIENT
Start: 2025-04-15

## 2025-04-14 RX ORDER — SODIUM CHLORIDE 9 MG/ML
20 INJECTION, SOLUTION INTRAVENOUS ONCE
OUTPATIENT
Start: 2025-04-15

## 2025-04-14 RX ORDER — FAMOTIDINE 10 MG/ML
20 INJECTION, SOLUTION INTRAVENOUS AS NEEDED
OUTPATIENT
Start: 2025-04-15

## 2025-04-14 RX ORDER — CETIRIZINE HYDROCHLORIDE 10 MG/1
10 TABLET ORAL ONCE
OUTPATIENT
Start: 2025-04-15

## 2025-04-14 RX ORDER — PROCHLORPERAZINE MALEATE 5 MG/1
10 TABLET ORAL ONCE
OUTPATIENT
Start: 2025-04-15 | End: 2025-04-15

## 2025-04-14 NOTE — TELEPHONE ENCOUNTER
Caller: LIGIA THORNE    Relationship: Child    Best call back number:     [phone]       What orders are you requesting (i.e. lab or imaging): IRON INFUSION    In what timeframe would the patient need to come in: ASAP    Where will you receive your lab/imaging services: [facility]    Additional notes: PATIENT'S DAUGHTER STATES THAT DR VIERA HAD ORDERED AN IRON INFUSION RECENTLY BUT IT WAS THE WRONG TYPE.     SHE STATES THAT THEY NEED THE SAME IRON INFUSION THAT THE PATIENT HAD LAST YEAR  ON 1.30.2024

## 2025-04-23 DIAGNOSIS — F51.01 PRIMARY INSOMNIA: ICD-10-CM

## 2025-04-23 RX ORDER — ZOLPIDEM TARTRATE 5 MG/1
5 TABLET ORAL NIGHTLY PRN
Qty: 90 TABLET | Refills: 0 | Status: SHIPPED | OUTPATIENT
Start: 2025-04-23

## 2025-04-23 NOTE — TELEPHONE ENCOUNTER
Caller: Luzma Dick    Relationship: Self    Best call back number: [phone]     Requested Prescriptions:   Requested Prescriptions     Pending Prescriptions Disp Refills    zolpidem (AMBIEN) 5 MG tablet [Pharmacy Med Name: ZOLPIDEM 5MG TABLETS] 90 tablet      Sig: TAKE 1 TABLET BY MOUTH AT NIGHT AS NEEDED FOR SLEEP        Pharmacy where request should be sent: [id number] [place] [facility] [place] [facility] [address]  AT [facility] & [address] - [phone]  - [phone] FX     Last office visit with prescribing clinician: 3/6/2025   Last telemedicine visit with prescribing clinician: Visit date not found   Next office visit with prescribing clinician: 9/8/2025     Additional details provided by patient: PATIENT IS COMPLETELY OUT OF MEDICATION.    Does the patient have less than a 3 day supply:  [x] Yes  [] No    Would you like a call back once the refill request has been completed: [x] Yes [] No    If the office needs to give you a call back, can they leave a voicemail: [] Yes [] No    Shelbi Sanchez Rep   04/23/25 10:55 EDT

## 2025-04-24 ENCOUNTER — HOSPITAL ENCOUNTER (OUTPATIENT)
Dept: INFUSION THERAPY | Facility: HOSPITAL | Age: 85
Discharge: HOME | End: 2025-04-24
Payer: MEDICARE

## 2025-04-24 VITALS
RESPIRATION RATE: 20 BRPM | DIASTOLIC BLOOD PRESSURE: 52 MMHG | BODY MASS INDEX: 35.34 KG/M2 | OXYGEN SATURATION: 97 % | WEIGHT: 212.08 LBS | HEART RATE: 80 BPM | HEIGHT: 65 IN | SYSTOLIC BLOOD PRESSURE: 124 MMHG | TEMPERATURE: 97.3 F

## 2025-04-24 DIAGNOSIS — D50.9 IRON DEFICIENCY ANEMIA, UNSPECIFIED IRON DEFICIENCY ANEMIA TYPE: ICD-10-CM

## 2025-04-24 DIAGNOSIS — N18.4 ANEMIA OF CHRONIC RENAL FAILURE, STAGE 4 (SEVERE): Primary | ICD-10-CM

## 2025-04-24 DIAGNOSIS — D63.1 ANEMIA OF CHRONIC RENAL FAILURE, STAGE 4 (SEVERE): Primary | ICD-10-CM

## 2025-04-24 PROCEDURE — A9270 NON-COVERED ITEM OR SERVICE: HCPCS | Performed by: INTERNAL MEDICINE

## 2025-04-24 PROCEDURE — 63710000001 PROCHLORPERAZINE MALEATE PER 5 MG: Performed by: INTERNAL MEDICINE

## 2025-04-24 PROCEDURE — 25010000002 FERRIC CARBOXYMALTOSE 750 MG/15ML SOLUTION: Performed by: INTERNAL MEDICINE

## 2025-04-24 PROCEDURE — 96365 THER/PROPH/DIAG IV INF INIT: CPT

## 2025-04-24 PROCEDURE — 63710000001 CETIRIZINE 10 MG TABLET: Performed by: INTERNAL MEDICINE

## 2025-04-24 PROCEDURE — 63710000001 ACETAMINOPHEN 325 MG TABLET: Performed by: INTERNAL MEDICINE

## 2025-04-24 RX ORDER — DIPHENHYDRAMINE HYDROCHLORIDE 50 MG/ML
50 INJECTION, SOLUTION INTRAMUSCULAR; INTRAVENOUS AS NEEDED
OUTPATIENT
Start: 2025-05-01

## 2025-04-24 RX ORDER — CETIRIZINE HYDROCHLORIDE 10 MG/1
10 TABLET ORAL ONCE
OUTPATIENT
Start: 2025-05-01

## 2025-04-24 RX ORDER — PROCHLORPERAZINE MALEATE 5 MG/1
10 TABLET ORAL ONCE
Status: COMPLETED | OUTPATIENT
Start: 2025-04-24 | End: 2025-04-24

## 2025-04-24 RX ORDER — DIPHENHYDRAMINE HCL 25 MG
25 CAPSULE ORAL ONCE
OUTPATIENT
Start: 2025-05-01

## 2025-04-24 RX ORDER — ACETAMINOPHEN 325 MG/1
650 TABLET ORAL ONCE
OUTPATIENT
Start: 2025-05-01

## 2025-04-24 RX ORDER — SODIUM CHLORIDE 9 MG/ML
20 INJECTION, SOLUTION INTRAVENOUS ONCE
OUTPATIENT
Start: 2025-05-01

## 2025-04-24 RX ORDER — ACETAMINOPHEN 325 MG/1
650 TABLET ORAL ONCE
Status: COMPLETED | OUTPATIENT
Start: 2025-04-24 | End: 2025-04-24

## 2025-04-24 RX ORDER — PROCHLORPERAZINE MALEATE 5 MG/1
10 TABLET ORAL ONCE
OUTPATIENT
Start: 2025-05-01 | End: 2025-05-01

## 2025-04-24 RX ORDER — CETIRIZINE HYDROCHLORIDE 10 MG/1
10 TABLET ORAL ONCE
Status: COMPLETED | OUTPATIENT
Start: 2025-04-24 | End: 2025-04-24

## 2025-04-24 RX ORDER — HYDROCORTISONE SODIUM SUCCINATE 100 MG/2ML
100 INJECTION INTRAMUSCULAR; INTRAVENOUS AS NEEDED
OUTPATIENT
Start: 2025-05-01

## 2025-04-24 RX ORDER — FAMOTIDINE 10 MG/ML
20 INJECTION, SOLUTION INTRAVENOUS AS NEEDED
OUTPATIENT
Start: 2025-05-01

## 2025-04-24 RX ADMIN — PROCHLORPERAZINE MALEATE 10 MG: 5 TABLET ORAL at 10:20

## 2025-04-24 RX ADMIN — FERRIC CARBOXYMALTOSE INJECTION 750 MG: 50 INJECTION, SOLUTION INTRAVENOUS at 11:07

## 2025-04-24 RX ADMIN — ACETAMINOPHEN 650 MG: 325 TABLET ORAL at 10:20

## 2025-04-24 RX ADMIN — CETIRIZINE HYDROCHLORIDE 10 MG: 10 TABLET, FILM COATED ORAL at 10:20

## 2025-04-29 RX ORDER — CARVEDILOL 12.5 MG/1
12.5 TABLET ORAL 2 TIMES DAILY WITH MEALS
Qty: 180 TABLET | Refills: 3 | Status: SHIPPED | OUTPATIENT
Start: 2025-04-29

## 2025-04-29 RX ORDER — DILTIAZEM HYDROCHLORIDE 180 MG/1
180 CAPSULE, EXTENDED RELEASE ORAL DAILY
Qty: 90 CAPSULE | Refills: 3 | Status: SHIPPED | OUTPATIENT
Start: 2025-04-29

## 2025-04-29 RX ORDER — ALLOPURINOL 300 MG/1
300 TABLET ORAL DAILY
Qty: 90 TABLET | Refills: 3 | Status: SHIPPED | OUTPATIENT
Start: 2025-04-29

## 2025-04-29 NOTE — TELEPHONE ENCOUNTER
Caller: Mayelin Luzma J    Relationship: Self    Best call back number: [phone]     Requested Prescriptions:   Requested Prescriptions     Pending Prescriptions Disp Refills    allopurinol (ZYLOPRIM) 300 MG tablet 90 tablet 3     Sig: Take 1 tablet by mouth Daily.    dilTIAZem (TIAZAC) 180 MG 24 hr capsule 90 capsule 3     Sig: Take 1 capsule by mouth Daily.    carvedilol (COREG) 12.5 MG tablet 180 tablet 3     Sig: Take 1 tablet by mouth 2 (Two) Times a Day With Meals.        Pharmacy where request should be sent: [place] [phone] [place] [phone]      Last office visit with prescribing clinician: 3/6/2025   Last telemedicine visit with prescribing clinician: Visit date not found   Next office visit with prescribing clinician: 9/8/2025     Does the patient have less than a 3 day supply:  [] Yes  [x] No    Would you like a call back once the refill request has been completed: [] Yes [] No    If the office needs to give you a call back, can they leave a voicemail: [] Yes [] No    Shelbi Chung Rep   04/29/25 12:30 EDT

## 2025-05-01 ENCOUNTER — HOSPITAL ENCOUNTER (OUTPATIENT)
Dept: INFUSION THERAPY | Facility: HOSPITAL | Age: 85
Discharge: HOME | End: 2025-05-01
Admitting: INTERNAL MEDICINE
Payer: MEDICARE

## 2025-05-01 VITALS
HEIGHT: 65 IN | BODY MASS INDEX: 35.3 KG/M2 | SYSTOLIC BLOOD PRESSURE: 139 MMHG | DIASTOLIC BLOOD PRESSURE: 64 MMHG | OXYGEN SATURATION: 97 % | HEART RATE: 84 BPM | WEIGHT: 211.86 LBS | RESPIRATION RATE: 20 BRPM | TEMPERATURE: 98.2 F

## 2025-05-01 DIAGNOSIS — D63.1 ANEMIA OF CHRONIC RENAL FAILURE, STAGE 4 (SEVERE): Primary | ICD-10-CM

## 2025-05-01 DIAGNOSIS — N18.4 ANEMIA OF CHRONIC RENAL FAILURE, STAGE 4 (SEVERE): Primary | ICD-10-CM

## 2025-05-01 DIAGNOSIS — D50.9 IRON DEFICIENCY ANEMIA, UNSPECIFIED IRON DEFICIENCY ANEMIA TYPE: ICD-10-CM

## 2025-05-01 PROCEDURE — 63710000001 PROCHLORPERAZINE MALEATE PER 5 MG: Performed by: INTERNAL MEDICINE

## 2025-05-01 PROCEDURE — A9270 NON-COVERED ITEM OR SERVICE: HCPCS | Performed by: INTERNAL MEDICINE

## 2025-05-01 PROCEDURE — 25010000002 FERRIC CARBOXYMALTOSE 750 MG/15ML SOLUTION: Performed by: INTERNAL MEDICINE

## 2025-05-01 PROCEDURE — 63710000001 CETIRIZINE 10 MG TABLET: Performed by: INTERNAL MEDICINE

## 2025-05-01 PROCEDURE — 96365 THER/PROPH/DIAG IV INF INIT: CPT

## 2025-05-01 PROCEDURE — 96374 THER/PROPH/DIAG INJ IV PUSH: CPT

## 2025-05-01 RX ORDER — FAMOTIDINE 10 MG/ML
20 INJECTION, SOLUTION INTRAVENOUS AS NEEDED
OUTPATIENT
Start: 2025-05-08

## 2025-05-01 RX ORDER — ACETAMINOPHEN 325 MG/1
650 TABLET ORAL ONCE
Status: DISCONTINUED | OUTPATIENT
Start: 2025-05-01 | End: 2025-05-03 | Stop reason: HOSPADM

## 2025-05-01 RX ORDER — PROCHLORPERAZINE MALEATE 5 MG/1
10 TABLET ORAL ONCE
Status: COMPLETED | OUTPATIENT
Start: 2025-05-01 | End: 2025-05-01

## 2025-05-01 RX ORDER — DIPHENHYDRAMINE HYDROCHLORIDE 50 MG/ML
50 INJECTION, SOLUTION INTRAMUSCULAR; INTRAVENOUS AS NEEDED
OUTPATIENT
Start: 2025-05-08

## 2025-05-01 RX ORDER — CETIRIZINE HYDROCHLORIDE 10 MG/1
10 TABLET ORAL ONCE
Status: COMPLETED | OUTPATIENT
Start: 2025-05-01 | End: 2025-05-01

## 2025-05-01 RX ORDER — CETIRIZINE HYDROCHLORIDE 10 MG/1
10 TABLET ORAL ONCE
Status: CANCELLED | OUTPATIENT
Start: 2025-05-08

## 2025-05-01 RX ORDER — SODIUM CHLORIDE 9 MG/ML
20 INJECTION, SOLUTION INTRAVENOUS ONCE
OUTPATIENT
Start: 2025-05-08

## 2025-05-01 RX ORDER — ACETAMINOPHEN 325 MG/1
650 TABLET ORAL ONCE
Status: CANCELLED | OUTPATIENT
Start: 2025-05-08

## 2025-05-01 RX ORDER — PROCHLORPERAZINE MALEATE 5 MG/1
10 TABLET ORAL ONCE
Status: CANCELLED | OUTPATIENT
Start: 2025-05-08 | End: 2025-05-08

## 2025-05-01 RX ORDER — DIPHENHYDRAMINE HCL 25 MG
25 CAPSULE ORAL ONCE
OUTPATIENT
Start: 2025-05-08

## 2025-05-01 RX ORDER — HYDROCORTISONE SODIUM SUCCINATE 100 MG/2ML
100 INJECTION INTRAMUSCULAR; INTRAVENOUS AS NEEDED
OUTPATIENT
Start: 2025-05-08

## 2025-05-01 RX ADMIN — PROCHLORPERAZINE MALEATE 10 MG: 5 TABLET ORAL at 13:18

## 2025-05-01 RX ADMIN — CETIRIZINE HYDROCHLORIDE 10 MG: 10 TABLET, FILM COATED ORAL at 13:18

## 2025-05-01 RX ADMIN — SODIUM CHLORIDE 750 MG: 900 INJECTION, SOLUTION INTRAVENOUS at 13:50

## 2025-06-04 ENCOUNTER — TRANSCRIBE ORDERS (OUTPATIENT)
Dept: LAB | Facility: HOSPITAL | Age: 85
End: 2025-06-04
Payer: MEDICARE

## 2025-06-04 ENCOUNTER — LAB (OUTPATIENT)
Dept: LAB | Facility: HOSPITAL | Age: 85
End: 2025-06-04
Payer: MEDICARE

## 2025-06-04 DIAGNOSIS — C50.919 MALIGNANT NEOPLASM OF FEMALE BREAST, UNSPECIFIED ESTROGEN RECEPTOR STATUS, UNSPECIFIED LATERALITY, UNSPECIFIED SITE OF BREAST: ICD-10-CM

## 2025-06-04 DIAGNOSIS — N18.4 CHRONIC KIDNEY DISEASE, STAGE IV (SEVERE): ICD-10-CM

## 2025-06-04 DIAGNOSIS — E11.22 TYPE 2 DIABETES MELLITUS WITH STAGE 4 CHRONIC KIDNEY DISEASE, WITHOUT LONG-TERM CURRENT USE OF INSULIN: ICD-10-CM

## 2025-06-04 DIAGNOSIS — I10 ESSENTIAL HYPERTENSION, MALIGNANT: ICD-10-CM

## 2025-06-04 DIAGNOSIS — E78.2 MIXED HYPERLIPIDEMIA: ICD-10-CM

## 2025-06-04 DIAGNOSIS — N18.30 STAGE 3 CHRONIC KIDNEY DISEASE, UNSPECIFIED WHETHER STAGE 3A OR 3B CKD: ICD-10-CM

## 2025-06-04 DIAGNOSIS — N28.89 URETERAL FISTULA: ICD-10-CM

## 2025-06-04 DIAGNOSIS — D50.9 IRON DEFICIENCY ANEMIA, UNSPECIFIED IRON DEFICIENCY ANEMIA TYPE: ICD-10-CM

## 2025-06-04 DIAGNOSIS — N18.4 CHRONIC KIDNEY DISEASE, STAGE IV (SEVERE): Primary | ICD-10-CM

## 2025-06-04 DIAGNOSIS — I10 HYPERTENSION, ESSENTIAL: ICD-10-CM

## 2025-06-04 DIAGNOSIS — E06.3 HYPOTHYROIDISM DUE TO HASHIMOTO'S THYROIDITIS: ICD-10-CM

## 2025-06-04 DIAGNOSIS — E03.9 ACQUIRED HYPOTHYROIDISM: ICD-10-CM

## 2025-06-04 DIAGNOSIS — E66.01 MORBID (SEVERE) OBESITY DUE TO EXCESS CALORIES: ICD-10-CM

## 2025-06-04 DIAGNOSIS — D63.1 ANEMIA OF CHRONIC RENAL FAILURE, STAGE 4 (SEVERE): ICD-10-CM

## 2025-06-04 DIAGNOSIS — N18.4 TYPE 2 DIABETES MELLITUS WITH STAGE 4 CHRONIC KIDNEY DISEASE, WITHOUT LONG-TERM CURRENT USE OF INSULIN: ICD-10-CM

## 2025-06-04 DIAGNOSIS — N28.1 ACQUIRED CYST OF KIDNEY: ICD-10-CM

## 2025-06-04 DIAGNOSIS — I48.11 LONGSTANDING PERSISTENT ATRIAL FIBRILLATION: ICD-10-CM

## 2025-06-04 DIAGNOSIS — F51.01 PRIMARY INSOMNIA: ICD-10-CM

## 2025-06-04 DIAGNOSIS — F41.9 ANXIETY: ICD-10-CM

## 2025-06-04 DIAGNOSIS — M10.00 IDIOPATHIC GOUT, UNSPECIFIED CHRONICITY, UNSPECIFIED SITE: ICD-10-CM

## 2025-06-04 DIAGNOSIS — E55.9 VITAMIN D DEFICIENCY: ICD-10-CM

## 2025-06-04 DIAGNOSIS — E06.3 CHRONIC LYMPHOCYTIC THYROIDITIS: ICD-10-CM

## 2025-06-04 DIAGNOSIS — E55.9 VITAMIN D DEFICIENCY, UNSPECIFIED: ICD-10-CM

## 2025-06-04 DIAGNOSIS — N18.4 ANEMIA OF CHRONIC RENAL FAILURE, STAGE 4 (SEVERE): ICD-10-CM

## 2025-06-04 DIAGNOSIS — C50.411 MALIGNANT NEOPLASM OF UPPER-OUTER QUADRANT OF RIGHT FEMALE BREAST, UNSPECIFIED ESTROGEN RECEPTOR STATUS: ICD-10-CM

## 2025-06-04 LAB
25(OH)D3 SERPL-MCNC: 34.1 NG/ML (ref 30–100)
ALBUMIN SERPL-MCNC: 3.8 G/DL (ref 3.5–5.2)
ALBUMIN UR-MCNC: 3.1 MG/DL
ANION GAP SERPL CALCULATED.3IONS-SCNC: 13 MMOL/L (ref 5–15)
BACTERIA UR QL AUTO: ABNORMAL /HPF
BASOPHILS # BLD AUTO: 0.02 10*3/MM3 (ref 0–0.2)
BASOPHILS NFR BLD AUTO: 0.2 % (ref 0–1.5)
BILIRUB UR QL STRIP: NEGATIVE
BUN SERPL-MCNC: 35 MG/DL (ref 8–23)
BUN/CREAT SERPL: 13.7 (ref 7–25)
CALCIUM SPEC-SCNC: 9.9 MG/DL (ref 8.6–10.5)
CHLORIDE SERPL-SCNC: 107 MMOL/L (ref 98–107)
CLARITY UR: CLEAR
CO2 SERPL-SCNC: 22 MMOL/L (ref 22–29)
COLOR UR: YELLOW
CREAT SERPL-MCNC: 2.55 MG/DL (ref 0.57–1)
CREAT UR-MCNC: 156.8 MG/DL
DEPRECATED RDW RBC AUTO: 50.2 FL (ref 37–54)
EGFRCR SERPLBLD CKD-EPI 2021: 18 ML/MIN/1.73
EOSINOPHIL # BLD AUTO: 0.22 10*3/MM3 (ref 0–0.4)
EOSINOPHIL NFR BLD AUTO: 2.7 % (ref 0.3–6.2)
ERYTHROCYTE [DISTWIDTH] IN BLOOD BY AUTOMATED COUNT: 15.6 % (ref 12.3–15.4)
GLUCOSE SERPL-MCNC: 120 MG/DL (ref 65–99)
GLUCOSE UR STRIP-MCNC: NEGATIVE MG/DL
HCT VFR BLD AUTO: 33.2 % (ref 34–46.6)
HGB BLD-MCNC: 10.8 G/DL (ref 12–15.9)
HGB UR QL STRIP.AUTO: NEGATIVE
HYALINE CASTS UR QL AUTO: ABNORMAL /LPF
IMM GRANULOCYTES # BLD AUTO: 0.06 10*3/MM3 (ref 0–0.05)
IMM GRANULOCYTES NFR BLD AUTO: 0.7 % (ref 0–0.5)
KETONES UR QL STRIP: NEGATIVE
LEUKOCYTE ESTERASE UR QL STRIP.AUTO: NEGATIVE
LYMPHOCYTES # BLD AUTO: 2.54 10*3/MM3 (ref 0.7–3.1)
LYMPHOCYTES NFR BLD AUTO: 31.5 % (ref 19.6–45.3)
MCH RBC QN AUTO: 29.1 PG (ref 26.6–33)
MCHC RBC AUTO-ENTMCNC: 32.5 G/DL (ref 31.5–35.7)
MCV RBC AUTO: 89.5 FL (ref 79–97)
MONOCYTES # BLD AUTO: 0.47 10*3/MM3 (ref 0.1–0.9)
MONOCYTES NFR BLD AUTO: 5.8 % (ref 5–12)
NEUTROPHILS NFR BLD AUTO: 4.76 10*3/MM3 (ref 1.7–7)
NEUTROPHILS NFR BLD AUTO: 59.1 % (ref 42.7–76)
NITRITE UR QL STRIP: NEGATIVE
NRBC BLD AUTO-RTO: 0 /100 WBC (ref 0–0.2)
PH UR STRIP.AUTO: 6.5 [PH] (ref 5–8)
PHOSPHATE SERPL-MCNC: 2.6 MG/DL (ref 2.5–4.5)
PLATELET # BLD AUTO: 235 10*3/MM3 (ref 140–450)
PMV BLD AUTO: 9.3 FL (ref 6–12)
POTASSIUM SERPL-SCNC: 4.4 MMOL/L (ref 3.5–5.2)
PROT ?TM UR-MCNC: 21 MG/DL
PROT UR QL STRIP: ABNORMAL
PROT/CREAT UR: 0.13 MG/G{CREAT}
PTH-INTACT SERPL-MCNC: 57.1 PG/ML (ref 15–65)
RBC # BLD AUTO: 3.71 10*6/MM3 (ref 3.77–5.28)
RBC # UR STRIP: ABNORMAL /HPF
REF LAB TEST METHOD: ABNORMAL
SODIUM SERPL-SCNC: 142 MMOL/L (ref 136–145)
SP GR UR STRIP: 1.02 (ref 1–1.03)
SQUAMOUS #/AREA URNS HPF: ABNORMAL /HPF
UROBILINOGEN UR QL STRIP: ABNORMAL
WBC # UR STRIP: ABNORMAL /HPF
WBC NRBC COR # BLD AUTO: 8.07 10*3/MM3 (ref 3.4–10.8)

## 2025-06-04 PROCEDURE — 85025 COMPLETE CBC W/AUTO DIFF WBC: CPT

## 2025-06-04 PROCEDURE — 81001 URINALYSIS AUTO W/SCOPE: CPT

## 2025-06-04 PROCEDURE — 82043 UR ALBUMIN QUANTITATIVE: CPT

## 2025-06-04 PROCEDURE — 36415 COLL VENOUS BLD VENIPUNCTURE: CPT

## 2025-06-04 PROCEDURE — 80069 RENAL FUNCTION PANEL: CPT

## 2025-06-04 PROCEDURE — 84156 ASSAY OF PROTEIN URINE: CPT

## 2025-06-04 PROCEDURE — 82570 ASSAY OF URINE CREATININE: CPT

## 2025-06-04 PROCEDURE — 82306 VITAMIN D 25 HYDROXY: CPT

## 2025-06-04 PROCEDURE — 83970 ASSAY OF PARATHORMONE: CPT

## 2025-07-09 RX ORDER — LEVOTHYROXINE SODIUM 50 UG/1
50 TABLET ORAL
Qty: 90 TABLET | Refills: 3 | Status: SHIPPED | OUTPATIENT
Start: 2025-07-09

## 2025-07-09 RX ORDER — LOSARTAN POTASSIUM AND HYDROCHLOROTHIAZIDE 25; 100 MG/1; MG/1
1 TABLET ORAL DAILY
Qty: 90 TABLET | Refills: 3 | Status: SHIPPED | OUTPATIENT
Start: 2025-07-09

## 2025-07-09 NOTE — TELEPHONE ENCOUNTER
Caller: MayelinLuzma perez    Relationship: Self    Best call back number: [phone]     Requested Prescriptions:   Requested Prescriptions     Pending Prescriptions Disp Refills    Semaglutide, 1 MG/DOSE, (OZEMPIC) 4 MG/3ML solution pen-injector 3 mL 5     Sig: Inject 1 mg under the skin into the appropriate area as directed 1 (One) Time Per Week.    levothyroxine (SYNTHROID, LEVOTHROID) 50 MCG tablet 90 tablet 3     Sig: Take 1 tablet by mouth Every Morning.    losartan-hydrochlorothiazide (Hyzaar) 100-25 MG per tablet 90 tablet 3     Sig: Take 1 tablet by mouth Daily.    METFORMIN    Pharmacy where request should be sent: [place] [phone] [place], [place] [phone]      Last office visit with prescribing clinician: 3/6/2025   Last telemedicine visit with prescribing clinician: Visit date not found   Next office visit with prescribing clinician: 9/8/2025         Does the patient have less than a 3 day supply:  [] Yes  [x] No    Would you like a call back once the refill request has been completed: [] Yes [] No    If the office needs to give you a call back, can they leave a voicemail: [] Yes [] No    Shelbi Sin   07/09/25 13:33 EDT

## 2025-07-10 ENCOUNTER — TELEPHONE (OUTPATIENT)
Dept: INTERNAL MEDICINE | Age: 85
End: 2025-07-10
Payer: MEDICARE

## 2025-07-10 RX ORDER — GLYBURIDE-METFORMIN HYDROCHLORIDE 2.5; 5 MG/1; MG/1
1 TABLET ORAL 2 TIMES DAILY WITH MEALS
Qty: 180 TABLET | Refills: 3 | Status: SHIPPED | OUTPATIENT
Start: 2025-07-10

## 2025-07-10 NOTE — TELEPHONE ENCOUNTER
Caller: Luzma Dick    Relationship: Self    Best call back number:   Telephone Information:   Mobile [phone]        What medication are you requesting: glyBURIDE-metFORMIN (GLUCOVANCE) 2.5-500 MG per tablet [50766] (Order [id number])     What are your current symptoms: DIABETES     Have you had these symptoms before:    [x] Yes  [] No    Have you been treated for these symptoms before:   [x] Yes  [] No    If a prescription is needed, what is your preferred pharmacy and phone number: [facility] - [address] [phone] [facility] [phone]

## 2025-07-10 NOTE — TELEPHONE ENCOUNTER
Requesting refill for glyBURIDE-metFORMIN (GLUCOVANCE) 2.5-500 MG per tablet oral, 1 tablet BID AC.    Med discontinued at 6/03/2024 ov, unsure of reason.    Spoke w/ pt's daughter Clemencia states pt has been taking this.    Please advise. Order pended # 180 / 3 refills if agreeable.    Last refill: 4/22/24  Last ov: 3/06/25  Next ov: 9/08/25

## 2025-07-23 ENCOUNTER — CLINICAL SUPPORT (OUTPATIENT)
Dept: INTERNAL MEDICINE | Age: 85
End: 2025-07-23
Payer: MEDICARE

## 2025-07-23 DIAGNOSIS — Z51.81 ENCOUNTER FOR MEDICATION MONITORING: Primary | ICD-10-CM

## 2025-07-23 LAB
AMPHET+METHAMPHET UR QL: NEGATIVE
AMPHETAMINES UR QL: NEGATIVE
BARBITURATES UR QL SCN: NEGATIVE
BENZODIAZ UR QL SCN: NEGATIVE
BUPRENORPHINE SERPL-MCNC: NEGATIVE NG/ML
CANNABINOIDS SERPL QL: NEGATIVE
COCAINE UR QL: NEGATIVE
FENTANYL UR-MCNC: NEGATIVE NG/ML
METHADONE UR QL SCN: NEGATIVE
OPIATES UR QL: NEGATIVE
OXYCODONE UR QL SCN: NEGATIVE
PCP UR QL SCN: NEGATIVE
TRICYCLICS UR QL SCN: NEGATIVE

## 2025-07-23 PROCEDURE — UDS: Performed by: INTERNAL MEDICINE

## 2025-07-23 PROCEDURE — 80307 DRUG TEST PRSMV CHEM ANLYZR: CPT | Performed by: INTERNAL MEDICINE

## 2025-08-19 RX ORDER — MULTIVIT-MIN/IRON/FOLIC ACID/K 18-600-40
2000 CAPSULE ORAL DAILY
Qty: 90 CAPSULE | Refills: 3 | Status: SHIPPED | OUTPATIENT
Start: 2025-08-19

## 2025-08-19 RX ORDER — COLESTIPOL HYDROCHLORIDE 1 G/1
1 TABLET ORAL 2 TIMES DAILY
Qty: 180 TABLET | Refills: 3 | Status: SHIPPED | OUTPATIENT
Start: 2025-08-19